# Patient Record
Sex: MALE | Race: WHITE | Employment: OTHER | URBAN - METROPOLITAN AREA
[De-identification: names, ages, dates, MRNs, and addresses within clinical notes are randomized per-mention and may not be internally consistent; named-entity substitution may affect disease eponyms.]

---

## 2017-08-04 ENCOUNTER — OFFICE VISIT (OUTPATIENT)
Dept: SURGERY | Age: 62
End: 2017-08-04

## 2017-08-04 VITALS
DIASTOLIC BLOOD PRESSURE: 80 MMHG | BODY MASS INDEX: 32.87 KG/M2 | HEART RATE: 113 BPM | SYSTOLIC BLOOD PRESSURE: 114 MMHG | WEIGHT: 248 LBS | HEIGHT: 73 IN

## 2017-08-04 DIAGNOSIS — K50.919 CROHN'S DISEASE WITH COMPLICATION, UNSPECIFIED GASTROINTESTINAL TRACT LOCATION (HCC): Primary | ICD-10-CM

## 2017-08-04 DIAGNOSIS — L02.415 ABSCESS OF RIGHT THIGH: ICD-10-CM

## 2017-08-04 PROCEDURE — 10060 I&D ABSCESS SIMPLE/SINGLE: CPT | Performed by: SURGERY

## 2017-08-04 RX ORDER — CIPROFLOXACIN 500 MG/1
500 TABLET, FILM COATED ORAL 2 TIMES DAILY
Qty: 14 TABLET | Refills: 0 | Status: SHIPPED | OUTPATIENT
Start: 2017-08-04 | End: 2017-08-11

## 2017-08-04 ASSESSMENT — ENCOUNTER SYMPTOMS
EYES NEGATIVE: 1
GASTROINTESTINAL NEGATIVE: 1
ALLERGIC/IMMUNOLOGIC NEGATIVE: 1
COLOR CHANGE: 0
RESPIRATORY NEGATIVE: 1

## 2017-08-07 ENCOUNTER — TELEPHONE (OUTPATIENT)
Dept: SURGERY | Age: 62
End: 2017-08-07

## 2017-08-08 LAB
ANAEROBIC CULTURE: ABNORMAL
GRAM STAIN RESULT: ABNORMAL
ORGANISM: ABNORMAL
ORGANISM: ABNORMAL
WOUND/ABSCESS: ABNORMAL
WOUND/ABSCESS: ABNORMAL

## 2017-08-09 ENCOUNTER — TELEPHONE (OUTPATIENT)
Dept: SURGERY | Age: 62
End: 2017-08-09

## 2017-08-09 ENCOUNTER — OFFICE VISIT (OUTPATIENT)
Dept: SURGERY | Age: 62
End: 2017-08-09

## 2017-08-09 VITALS
SYSTOLIC BLOOD PRESSURE: 114 MMHG | BODY MASS INDEX: 31.7 KG/M2 | WEIGHT: 247 LBS | HEIGHT: 74 IN | DIASTOLIC BLOOD PRESSURE: 83 MMHG | HEART RATE: 95 BPM

## 2017-08-09 DIAGNOSIS — L02.415 ABSCESS OF RIGHT THIGH: ICD-10-CM

## 2017-08-09 PROCEDURE — 99024 POSTOP FOLLOW-UP VISIT: CPT | Performed by: NURSE PRACTITIONER

## 2017-08-09 RX ORDER — CLINDAMYCIN HYDROCHLORIDE 300 MG/1
300 CAPSULE ORAL 3 TIMES DAILY
Qty: 30 CAPSULE | Refills: 0 | Status: SHIPPED | OUTPATIENT
Start: 2017-08-09 | End: 2017-08-19

## 2017-08-13 PROBLEM — E11.9 DM2 (DIABETES MELLITUS, TYPE 2) (HCC): Status: ACTIVE | Noted: 2017-08-13

## 2017-08-13 PROBLEM — E78.2 MIXED HYPERLIPIDEMIA: Status: ACTIVE | Noted: 2017-08-13

## 2017-08-13 PROBLEM — E66.9 OBESITY (BMI 30-39.9): Status: ACTIVE | Noted: 2017-08-13

## 2017-08-13 PROBLEM — I21.4 NSTEMI (NON-ST ELEVATED MYOCARDIAL INFARCTION) (HCC): Status: ACTIVE | Noted: 2017-08-13

## 2017-08-18 ENCOUNTER — OFFICE VISIT (OUTPATIENT)
Dept: INTERNAL MEDICINE CLINIC | Age: 62
End: 2017-08-18

## 2017-08-18 VITALS
SYSTOLIC BLOOD PRESSURE: 98 MMHG | OXYGEN SATURATION: 97 % | HEIGHT: 74 IN | DIASTOLIC BLOOD PRESSURE: 62 MMHG | TEMPERATURE: 98.1 F | WEIGHT: 245 LBS | HEART RATE: 72 BPM | BODY MASS INDEX: 31.44 KG/M2

## 2017-08-18 DIAGNOSIS — I25.10 CORONARY ARTERY DISEASE INVOLVING NATIVE HEART WITHOUT ANGINA PECTORIS, UNSPECIFIED VESSEL OR LESION TYPE: Primary | ICD-10-CM

## 2017-08-18 DIAGNOSIS — K50.119 CROHN'S DISEASE OF LARGE INTESTINE WITH COMPLICATION (HCC): ICD-10-CM

## 2017-08-18 DIAGNOSIS — E78.5 HYPERLIPIDEMIA, UNSPECIFIED HYPERLIPIDEMIA TYPE: ICD-10-CM

## 2017-08-18 DIAGNOSIS — E11.9 TYPE 2 DIABETES MELLITUS WITHOUT COMPLICATION, UNSPECIFIED LONG TERM INSULIN USE STATUS: ICD-10-CM

## 2017-08-18 DIAGNOSIS — F17.200 SMOKING: ICD-10-CM

## 2017-08-18 PROCEDURE — 99204 OFFICE O/P NEW MOD 45 MIN: CPT | Performed by: INTERNAL MEDICINE

## 2017-08-18 RX ORDER — ATORVASTATIN CALCIUM 20 MG/1
20 TABLET, FILM COATED ORAL NIGHTLY
Qty: 90 TABLET | Refills: 3 | Status: SHIPPED | OUTPATIENT
Start: 2017-08-18 | End: 2017-08-29 | Stop reason: SDUPTHER

## 2017-08-18 RX ORDER — METOPROLOL SUCCINATE 25 MG/1
12.5 TABLET, EXTENDED RELEASE ORAL DAILY
Qty: 45 TABLET | Refills: 3 | Status: SHIPPED | OUTPATIENT
Start: 2017-08-18 | End: 2017-08-29 | Stop reason: DRUGHIGH

## 2017-08-18 RX ORDER — LANCETS 28 GAUGE
1 EACH MISCELLANEOUS DAILY
Qty: 200 EACH | Refills: 3 | Status: SHIPPED | OUTPATIENT
Start: 2017-08-18 | End: 2021-11-17

## 2017-08-18 RX ORDER — PEN NEEDLE, DIABETIC 30 GX3/16"
1 NEEDLE, DISPOSABLE MISCELLANEOUS
Qty: 200 EACH | Refills: 3 | Status: SHIPPED | OUTPATIENT
Start: 2017-08-18 | End: 2018-10-03 | Stop reason: SDUPTHER

## 2017-08-18 RX ORDER — INFLIXIMAB 100 MG/10ML
5 INJECTION, POWDER, LYOPHILIZED, FOR SOLUTION INTRAVENOUS SEE ADMIN INSTRUCTIONS
COMMUNITY

## 2017-08-18 ASSESSMENT — ENCOUNTER SYMPTOMS
VOMITING: 0
DIARRHEA: 0
NAUSEA: 0
TROUBLE SWALLOWING: 0
SHORTNESS OF BREATH: 0
SORE THROAT: 0
WHEEZING: 0
ABDOMINAL PAIN: 0

## 2017-08-21 ENCOUNTER — TELEPHONE (OUTPATIENT)
Dept: INTERNAL MEDICINE CLINIC | Age: 62
End: 2017-08-21

## 2017-08-21 DIAGNOSIS — E11.9 TYPE 2 DIABETES MELLITUS WITHOUT COMPLICATION, WITHOUT LONG-TERM CURRENT USE OF INSULIN (HCC): Primary | ICD-10-CM

## 2017-08-23 ENCOUNTER — TELEPHONE (OUTPATIENT)
Dept: INTERNAL MEDICINE CLINIC | Age: 62
End: 2017-08-23

## 2017-08-25 ENCOUNTER — OFFICE VISIT (OUTPATIENT)
Dept: INTERNAL MEDICINE CLINIC | Age: 62
End: 2017-08-25

## 2017-08-25 VITALS
DIASTOLIC BLOOD PRESSURE: 80 MMHG | OXYGEN SATURATION: 98 % | HEART RATE: 74 BPM | BODY MASS INDEX: 31.2 KG/M2 | SYSTOLIC BLOOD PRESSURE: 114 MMHG | WEIGHT: 243 LBS

## 2017-08-25 DIAGNOSIS — I21.4 NSTEMI (NON-ST ELEVATED MYOCARDIAL INFARCTION) (HCC): ICD-10-CM

## 2017-08-25 DIAGNOSIS — E11.59 TYPE 2 DIABETES MELLITUS WITH OTHER CIRCULATORY COMPLICATION, UNSPECIFIED LONG TERM INSULIN USE STATUS: Primary | ICD-10-CM

## 2017-08-25 PROCEDURE — 99214 OFFICE O/P EST MOD 30 MIN: CPT | Performed by: INTERNAL MEDICINE

## 2017-08-25 ASSESSMENT — ENCOUNTER SYMPTOMS
DIARRHEA: 0
TROUBLE SWALLOWING: 0
SORE THROAT: 0
NAUSEA: 0
WHEEZING: 0
ABDOMINAL PAIN: 0
VOMITING: 0
SHORTNESS OF BREATH: 0

## 2017-08-28 ENCOUNTER — TELEPHONE (OUTPATIENT)
Dept: SURGERY | Age: 62
End: 2017-08-28

## 2017-08-29 ENCOUNTER — TELEPHONE (OUTPATIENT)
Dept: CARDIOLOGY CLINIC | Age: 62
End: 2017-08-29

## 2017-08-29 ENCOUNTER — OFFICE VISIT (OUTPATIENT)
Dept: CARDIOLOGY CLINIC | Age: 62
End: 2017-08-29

## 2017-08-29 ENCOUNTER — OFFICE VISIT (OUTPATIENT)
Dept: SURGERY | Age: 62
End: 2017-08-29

## 2017-08-29 VITALS
HEIGHT: 74 IN | BODY MASS INDEX: 31.51 KG/M2 | DIASTOLIC BLOOD PRESSURE: 78 MMHG | OXYGEN SATURATION: 96 % | WEIGHT: 245.5 LBS | SYSTOLIC BLOOD PRESSURE: 132 MMHG | HEART RATE: 74 BPM

## 2017-08-29 VITALS
OXYGEN SATURATION: 93 % | WEIGHT: 245.4 LBS | DIASTOLIC BLOOD PRESSURE: 68 MMHG | SYSTOLIC BLOOD PRESSURE: 106 MMHG | HEART RATE: 73 BPM | BODY MASS INDEX: 31.51 KG/M2

## 2017-08-29 DIAGNOSIS — E78.2 MIXED HYPERLIPIDEMIA: ICD-10-CM

## 2017-08-29 DIAGNOSIS — I25.10 CORONARY ARTERY DISEASE INVOLVING NATIVE CORONARY ARTERY OF NATIVE HEART WITHOUT ANGINA PECTORIS: ICD-10-CM

## 2017-08-29 DIAGNOSIS — E11.9 TYPE 2 DIABETES MELLITUS WITHOUT COMPLICATION, UNSPECIFIED LONG TERM INSULIN USE STATUS: ICD-10-CM

## 2017-08-29 DIAGNOSIS — N49.2 ABSCESS OF SCROTAL WALL: Primary | ICD-10-CM

## 2017-08-29 DIAGNOSIS — I25.10 CORONARY ARTERY DISEASE INVOLVING NATIVE HEART WITHOUT ANGINA PECTORIS, UNSPECIFIED VESSEL OR LESION TYPE: ICD-10-CM

## 2017-08-29 DIAGNOSIS — I21.4 NSTEMI (NON-ST ELEVATED MYOCARDIAL INFARCTION) (HCC): Primary | ICD-10-CM

## 2017-08-29 DIAGNOSIS — E78.5 HYPERLIPIDEMIA, UNSPECIFIED HYPERLIPIDEMIA TYPE: ICD-10-CM

## 2017-08-29 DIAGNOSIS — Z72.0 TOBACCO ABUSE: ICD-10-CM

## 2017-08-29 DIAGNOSIS — I71.21 ASCENDING AORTIC ANEURYSM: ICD-10-CM

## 2017-08-29 PROCEDURE — 10060 I&D ABSCESS SIMPLE/SINGLE: CPT | Performed by: NURSE PRACTITIONER

## 2017-08-29 PROCEDURE — 99214 OFFICE O/P EST MOD 30 MIN: CPT | Performed by: NURSE PRACTITIONER

## 2017-08-29 PROCEDURE — 99213 OFFICE O/P EST LOW 20 MIN: CPT | Performed by: SURGERY

## 2017-08-29 PROCEDURE — 10021 FNA BX W/O IMG GDN 1ST LES: CPT | Performed by: NURSE PRACTITIONER

## 2017-08-29 RX ORDER — NITROGLYCERIN 0.4 MG/1
0.4 TABLET SUBLINGUAL EVERY 5 MIN PRN
Qty: 25 TABLET | Refills: 3 | Status: SHIPPED | OUTPATIENT
Start: 2017-08-29 | End: 2020-07-07 | Stop reason: SDUPTHER

## 2017-08-29 RX ORDER — AMOXICILLIN AND CLAVULANATE POTASSIUM 875; 125 MG/1; MG/1
1 TABLET, FILM COATED ORAL 2 TIMES DAILY
Qty: 14 TABLET | Refills: 0 | Status: SHIPPED | OUTPATIENT
Start: 2017-08-29 | End: 2017-09-05

## 2017-08-29 RX ORDER — METOPROLOL SUCCINATE 25 MG/1
25 TABLET, EXTENDED RELEASE ORAL DAILY
Qty: 90 TABLET | Refills: 3 | Status: SHIPPED | OUTPATIENT
Start: 2017-08-29 | End: 2017-09-06 | Stop reason: SDUPTHER

## 2017-08-29 RX ORDER — ATORVASTATIN CALCIUM 40 MG/1
40 TABLET, FILM COATED ORAL NIGHTLY
Qty: 90 TABLET | Refills: 3 | Status: SHIPPED | OUTPATIENT
Start: 2017-08-29 | End: 2018-09-07 | Stop reason: SDUPTHER

## 2017-08-29 ASSESSMENT — ENCOUNTER SYMPTOMS
GASTROINTESTINAL NEGATIVE: 1
RESPIRATORY NEGATIVE: 1
VOMITING: 0
ABDOMINAL PAIN: 0

## 2017-09-03 LAB
GRAM STAIN RESULT: ABNORMAL
ORGANISM: ABNORMAL
WOUND/ABSCESS: ABNORMAL

## 2017-09-06 ENCOUNTER — TELEPHONE (OUTPATIENT)
Dept: CARDIOLOGY CLINIC | Age: 62
End: 2017-09-06

## 2017-09-06 ENCOUNTER — TELEPHONE (OUTPATIENT)
Dept: SURGERY | Age: 62
End: 2017-09-06

## 2017-09-06 DIAGNOSIS — K61.1 PERIRECTAL ABSCESS: ICD-10-CM

## 2017-09-06 DIAGNOSIS — I25.10 CORONARY ARTERY DISEASE INVOLVING NATIVE HEART WITHOUT ANGINA PECTORIS, UNSPECIFIED VESSEL OR LESION TYPE: ICD-10-CM

## 2017-09-06 DIAGNOSIS — K50.90 CROHN'S DISEASE WITHOUT COMPLICATION, UNSPECIFIED GASTROINTESTINAL TRACT LOCATION (HCC): Primary | ICD-10-CM

## 2017-09-06 RX ORDER — METOPROLOL SUCCINATE 25 MG/1
25 TABLET, EXTENDED RELEASE ORAL DAILY
Qty: 90 TABLET | Refills: 3 | Status: SHIPPED | OUTPATIENT
Start: 2017-09-06 | End: 2017-12-13 | Stop reason: SDUPTHER

## 2017-09-07 ENCOUNTER — TELEPHONE (OUTPATIENT)
Dept: SURGERY | Age: 62
End: 2017-09-07

## 2017-09-14 ENCOUNTER — TELEPHONE (OUTPATIENT)
Dept: SURGERY | Age: 62
End: 2017-09-14

## 2017-10-24 ENCOUNTER — TELEPHONE (OUTPATIENT)
Dept: INTERNAL MEDICINE CLINIC | Age: 62
End: 2017-10-24

## 2017-10-24 NOTE — TELEPHONE ENCOUNTER
Patient would like a A1C drawn on 11/2/17 during his office visit, he would like a call to let him know this will be done so he can fast [phone].

## 2017-11-08 DIAGNOSIS — E11.9 TYPE 2 DIABETES MELLITUS WITHOUT COMPLICATION, WITHOUT LONG-TERM CURRENT USE OF INSULIN (HCC): ICD-10-CM

## 2017-11-29 ENCOUNTER — OFFICE VISIT (OUTPATIENT)
Dept: INTERNAL MEDICINE CLINIC | Age: 62
End: 2017-11-29

## 2017-11-29 VITALS
HEART RATE: 102 BPM | OXYGEN SATURATION: 98 % | SYSTOLIC BLOOD PRESSURE: 104 MMHG | WEIGHT: 245 LBS | BODY MASS INDEX: 31.46 KG/M2 | DIASTOLIC BLOOD PRESSURE: 76 MMHG

## 2017-11-29 DIAGNOSIS — E11.9 TYPE 2 DIABETES MELLITUS WITHOUT COMPLICATION, WITHOUT LONG-TERM CURRENT USE OF INSULIN (HCC): ICD-10-CM

## 2017-11-29 DIAGNOSIS — R06.02 SHORTNESS OF BREATH: Primary | ICD-10-CM

## 2017-11-29 PROCEDURE — 99212 OFFICE O/P EST SF 10 MIN: CPT | Performed by: INTERNAL MEDICINE

## 2017-11-30 ENCOUNTER — OFFICE VISIT (OUTPATIENT)
Dept: CARDIOLOGY CLINIC | Age: 62
End: 2017-11-30

## 2017-11-30 VITALS
HEART RATE: 78 BPM | OXYGEN SATURATION: 98 % | SYSTOLIC BLOOD PRESSURE: 114 MMHG | DIASTOLIC BLOOD PRESSURE: 74 MMHG | BODY MASS INDEX: 31.06 KG/M2 | WEIGHT: 242 LBS | HEIGHT: 74 IN

## 2017-11-30 DIAGNOSIS — R91.1 PULMONARY NODULE: ICD-10-CM

## 2017-11-30 DIAGNOSIS — R07.9 CHEST PAIN, UNSPECIFIED TYPE: Primary | ICD-10-CM

## 2017-11-30 DIAGNOSIS — I25.10 CORONARY ARTERY DISEASE INVOLVING NATIVE HEART WITHOUT ANGINA PECTORIS, UNSPECIFIED VESSEL OR LESION TYPE: ICD-10-CM

## 2017-11-30 DIAGNOSIS — Z87.891 HISTORY OF TOBACCO USE: ICD-10-CM

## 2017-11-30 DIAGNOSIS — I71.21 ASCENDING AORTIC ANEURYSM: ICD-10-CM

## 2017-11-30 DIAGNOSIS — R06.02 SOB (SHORTNESS OF BREATH): ICD-10-CM

## 2017-11-30 DIAGNOSIS — E78.2 MIXED HYPERLIPIDEMIA: ICD-10-CM

## 2017-11-30 LAB
ANION GAP SERPL CALCULATED.3IONS-SCNC: 14 MMOL/L (ref 3–16)
BASOPHILS ABSOLUTE: 0.1 K/UL (ref 0–0.2)
BASOPHILS RELATIVE PERCENT: 0.9 %
BUN BLDV-MCNC: 19 MG/DL (ref 7–20)
CALCIUM SERPL-MCNC: 10.1 MG/DL (ref 8.3–10.6)
CHLORIDE BLD-SCNC: 98 MMOL/L (ref 99–110)
CO2: 26 MMOL/L (ref 21–32)
CREAT SERPL-MCNC: 1 MG/DL (ref 0.8–1.3)
EOSINOPHILS ABSOLUTE: 0.3 K/UL (ref 0–0.6)
EOSINOPHILS RELATIVE PERCENT: 3.8 %
ESTIMATED AVERAGE GLUCOSE: 286.2 MG/DL
GFR AFRICAN AMERICAN: >60
GFR NON-AFRICAN AMERICAN: >60
GLUCOSE BLD-MCNC: 369 MG/DL (ref 70–99)
HBA1C MFR BLD: 11.6 %
HCT VFR BLD CALC: 47.2 % (ref 40.5–52.5)
HEMOGLOBIN: 16 G/DL (ref 13.5–17.5)
LYMPHOCYTES ABSOLUTE: 2.7 K/UL (ref 1–5.1)
LYMPHOCYTES RELATIVE PERCENT: 38.9 %
MCH RBC QN AUTO: 28.9 PG (ref 26–34)
MCHC RBC AUTO-ENTMCNC: 34 G/DL (ref 31–36)
MCV RBC AUTO: 85 FL (ref 80–100)
MONOCYTES ABSOLUTE: 0.7 K/UL (ref 0–1.3)
MONOCYTES RELATIVE PERCENT: 9.7 %
NEUTROPHILS ABSOLUTE: 3.2 K/UL (ref 1.7–7.7)
NEUTROPHILS RELATIVE PERCENT: 46.7 %
PDW BLD-RTO: 14.1 % (ref 12.4–15.4)
PLATELET # BLD: 221 K/UL (ref 135–450)
PMV BLD AUTO: 8.8 FL (ref 5–10.5)
POTASSIUM SERPL-SCNC: 5.3 MMOL/L (ref 3.5–5.1)
PRO-BNP: 19 PG/ML (ref 0–124)
RBC # BLD: 5.55 M/UL (ref 4.2–5.9)
SODIUM BLD-SCNC: 138 MMOL/L (ref 136–145)
WBC # BLD: 6.9 K/UL (ref 4–11)

## 2017-11-30 PROCEDURE — 99215 OFFICE O/P EST HI 40 MIN: CPT | Performed by: NURSE PRACTITIONER

## 2017-11-30 PROCEDURE — 93000 ELECTROCARDIOGRAM COMPLETE: CPT | Performed by: NURSE PRACTITIONER

## 2017-11-30 RX ORDER — FUROSEMIDE 40 MG/1
40 TABLET ORAL DAILY
Qty: 30 TABLET | Refills: 3 | Status: SHIPPED | OUTPATIENT
Start: 2017-11-30 | End: 2017-12-22 | Stop reason: SDUPTHER

## 2017-11-30 RX ORDER — CLOPIDOGREL BISULFATE 75 MG/1
75 TABLET ORAL DAILY
Qty: 30 TABLET | Refills: 3 | Status: SHIPPED | OUTPATIENT
Start: 2017-11-30 | End: 2017-12-22 | Stop reason: SDUPTHER

## 2017-11-30 ASSESSMENT — ENCOUNTER SYMPTOMS
ABDOMINAL PAIN: 0
SHORTNESS OF BREATH: 1
SORE THROAT: 0
DIARRHEA: 0
TROUBLE SWALLOWING: 0
WHEEZING: 0
NAUSEA: 0
VOMITING: 0

## 2017-11-30 NOTE — PROGRESS NOTES
[facility] [address]  Office Visit    Dillon Holloway  1955 November 30, 2017    CC:   Chief Complaint   Patient presents with    3 Month Follow-Up     NSTEMI, CAD, s/p PTCA/ Stent, AAA, HLD, DM    Chest Pain     feels like a bruise on left side of chest    Dizziness     sometimes, not sure if sugar or something else    Fatigue     feels exhausted all the time    Shortness of Breath     at rest and on exertion; did not have SOB during the time he skipped a dose of brilinta     HPI:  The patient is 65 y.o. male with a past medical history significant for tobacco use, AAA, DM, CAD and Crohn's with prior resection here for follow up. Hospitalized from 8/12/2017-8/15/2017 with NSTEMI. He underwent cardiac cath which resulted in SHAN x2 to RCA. LVEF 35% initially and follow up echo showing LVEF 55-60% (done after PCI). He was last seen in office in late 8/2017 at which time Toprol was increased and his statin was adjusted. Not feeling well: \"I have no energy and I can't breathe. \"  Has been occurring since procedure and BP and O2 sats have been normal. Sxs has worsened over last few weeks. Quit smoking since his MI (has 39 year history). Finds he has to take a deep breath at times and has difficulty taking it. Marked SOB with walking ~ 50 feet. Denies orthopnea/PND. Wife states he quits breathing in his sleep. He is unable to do any regular exercise d/t SOB. Denies cough. Has L axillary pain which is reproducible on palpation. Denies palpitations, dizziness, syncope, edema , weight change or claudication. Trialed day off Brilinta and SOB improved (was able to walk from [facility] [address] [place] [employer] [place])     Review of Systems:  Constitutional: Denies weakness, night sweats or fever. + chronic fatigue and worsening  HEENT: Denies new visual changes, ringing in ears, nosebleeds, nasal congestion  Respiratory: + increasing SOB.    Cardiovascular: see HPI  GI: Denies N/V, diarrhea, constipation, abdominal pain. + fistula  : Denies urinary frequency, urgency, incontinence, hematuria or dysuria. Skin: Denies rash, hives, or cyanosis  Musculoskeletal:+ generalized joint pain  Neurological: Denies syncope or TIA-like symptoms. Psychiatric: Denies anxiety, insomnia or depression     Past Medical History:   Diagnosis Date    CAD (coronary artery disease) 08/2017    NSTEMI    [facility]     Diabetes mellitus ([facility]     Fistula of intestine     History of resection of small bowel     Pancreatitis     Peritonitis ([facility]      Past Surgical History:   Procedure Laterality Date    ABSCESS DRAINAGE  2/11/15    I and d of scrotal abscess    ABSCESS DRAINAGE Right 08/04/2017    right inner thigh    CARDIAC CATHETERIZATION  08/2017    SHAN x 2 to RCA; OM and Dg stenosis    CHOLECYSTECTOMY      SMALL INTESTINE SURGERY       Family History   Problem Relation Age of Onset    Diabetes Mother     Cancer Mother      Social History   Substance Use Topics    Smoking status: Former Smoker     Packs/day: 1.50     Years: 10.00     Types: Cigarettes     Quit date: 8/12/2017    Smokeless tobacco: Never Used    Alcohol use Yes      Comment: occasional       Allergies   Allergen Reactions    Codeine      Pt unsure of exact reaction    Percocet [Oxycodone-Acetaminophen]      Pt \"doesn't feel right\" when taking.      Current Outpatient Prescriptions   Medication Sig Dispense Refill    clopidogrel (PLAVIX) 75 MG tablet Take 1 tablet by mouth daily 30 tablet 3    furosemide (LASIX) 40 MG tablet Take 1 tablet by mouth daily 30 tablet 3    metFORMIN (GLUCOPHAGE) 500 MG tablet Take 1 tablet by mouth 2 times daily (with meals) 270 tablet 3    metoprolol succinate (TOPROL XL) 25 MG extended release tablet Take 1 tablet by mouth daily 90 tablet 3    nitroGLYCERIN (NITROSTAT) 0.4 MG SL tablet Place 1 tablet under the tongue every 5 minutes as needed for Chest pain 25 tablet 3    atorvastatin (LIPITOR) 40 MG tablet Take 1 tablet by mouth nightly 90 tablet 3    inFLIXimab (REMICADE) 100 MG injection Infuse 5 mg/kg intravenously See Admin Instructions q 6-8 wks      FREESTYLE LANCETS MISC 1 each by Does not apply route daily 200 each 3    Insulin Pen Needle (PEN NEEDLES) 32G X 4 MM MISC 1 Container by Does not apply route every morning (before breakfast) 200 each 3    insulin glargine (LANTUS SOLOSTAR) 100 UNIT/ML injection pen Inject 25 Units into the skin nightly 8 Pen 3    aspirin 81 MG chewable tablet Take 1 tablet by mouth daily 30 tablet 3    glucose monitoring kit (FREESTYLE) monitoring kit 1 kit by Does not apply route daily as needed (BS checks) 1 kit 0    glucose blood VI test strips (FREESTYLE TEST STRIPS) strip 1 each by In Vitro route daily As needed. 100 each 3    HYDROcodone-acetaminophen (NORCO) 5-325 MG per tablet Take 1 tablet by mouth every 6 hours as needed for Pain.  mesalamine (PENTASA) 500 MG CR capsule Take 1,000 mg by mouth 2 times daily        No current facility-administered medications for this visit. Physical Exam:   /74   Pulse 78   Ht 6' 2\" (1.88 m)   Wt 242 lb (109.8 kg) Comment: without shoes  SpO2 98%   BMI 31.07 kg/m²   Wt Readings from Last 2 Encounters:   11/30/17 242 lb (109.8 kg)   11/29/17 245 lb (111.1 kg)     Constitutional: He is oriented to person, place, and time. He appears well-developed and well-nourished. Voicing several complaints and frustration. HEENT: Normocephalic and atraumatic. Sclerae anicteric. No xanthelasmas. EOM's intact. Neck: Neck supple. No JVD present. Carotids without bruits. No thyromegaly present. No lymphadenopathy present. Cardiovascular: RRR, normal S1 and S2; no murmur/gallop or rub, PMI nondisplaced  Pulmonary/Chest: Effort normal and breath sounds normal.  Lungs with mildly diminished breath sounds bilaterally.  Chest wall nontender  Abdominal: soft, nontender, nondistended. + bowel sounds; no hepatomegaly  Extremities: No edema, cyanosis, or clubbing. Pulses are 2+ radial/DP/PT bilaterally. Cap refill brisk. Neurological: No focal deficit. Skin: Skin is warm and dry. Psychiatric: He has a normal mood and affect. His speech is normal and behavior is normal.     Lab Review:   Lab Results   Component Value Date    TRIG 309 08/13/2017    HDL 42 08/13/2017    LDLCALC see below 08/13/2017    LDLDIRECT 76 08/13/2017    LABVLDL see below 08/13/2017     Lab Results   Component Value Date     08/13/2017    K 4.5 08/13/2017    CL 97 08/13/2017    CO2 25 08/13/2017    BUN 18 08/13/2017    CREATININE 0.8 08/13/2017    GLUCOSE 306 08/13/2017    CALCIUM 9.0 08/13/2017      Lab Results   Component Value Date    WBC 7.9 08/12/2017    HGB 16.1 08/12/2017    HCT 47.5 08/12/2017    MCV 86.0 08/12/2017     08/12/2017     ECG 11/30/2017: Normal sinus rhythm without ischemia    LHC/PCI 8/14/2017: (Dr. Gary Jackson)  Conclusions:  -100% proximal-mid RCA stenosis stented with a 3.5 x 38 and 4.0 x 16 mm overlapping Synergy stents  -70% stenosis in a large OM and 95% stenosis in a narrow-caliber diagonal branch  -LVEF 35% with global dysfunction but more severe inferior wall dysfunction  -LVEDP of 20 post procedure    Echo 8/14/2017:  Normal LV size and systolic function. Estimated ejection fraction is 60%. No valvular abnormalities. Normal study. CTA pulmonary 8/12/2017:  No acute pulmonary embolus detected.       Mild aneurysmal dilation of the ascending aorta, 4.1 cm.  Coronary artery   calcifications are noted.       2 patchy areas of ground-glass airspace disease right lower lobe.  Pneumonia   is suspected.       8.1 mm size nodule left lower lobe along the diaphragmatic pleura.  This   could be within the lung parenchyma or involve the pleura.  Follow-up is   recommended, see below. Assessment:    1.  Chest Pain, unspecified type  -atypical for angina (he states reproducible on palpation L axillary); unable

## 2017-11-30 NOTE — PROGRESS NOTES
Department of Internal Medicine  Clinic Note    Date: 11/29/2017                                               Subjective/Objective:     Chief Complaint   Patient presents with    Diabetes     HPI: Patient presents today for follow-up evaluation for type 2 diabetes. Today patient states he is able to take his metformin at 500 mg twice a day. The diarrhea he was endorsing previously have subsided. Patient states he has stopped taking his Lantus because he noticed his serum glucose had a drastic fluctuation when he will check it at home. Although never becoming hypoglycemic. We discussed the fact that his serum glucose will fluctuate throughout the day before and before and after meals and that this is why he needs to monitor and document his serum glucose at home. He was advised to start taking the Lantus and monitoring closely his serum glucose to determine efficacy of treatment. Patient will be given another referral for diabetic education. We also discussed today and exercise the patient states that his eating habits have improved significantly. He states he is unable to exercise because of excessive shortness of breath that he states is attributed to Brilinta. He will be seeing his cardiologist tomorrow to discuss potential treatment options to come off [address].          Patient Active Problem List    Diagnosis Date Noted    Chest pain      Priority: High    Ischemic cardiomyopathy     Ascending aortic aneurysm (HCC)     NSTEMI (non-ST elevated myocardial infarction) ([facility] [address] 08/13/2017    Mixed hyperlipidemia 08/13/2017    Type 2 diabetes mellitus without complication, without long-term current use of insulin ([address] 08/13/2017    Obesity (BMI 30-39.9) 08/13/2017    Tobacco abuse     Abscess of right thigh 08/09/2017    Perirectal abscess 02/11/2015    Crohn's disease ([facility] [address]        Past Medical History:   Diagnosis Date    CAD (coronary artery disease)     Crohn's disease ([address]     Diabetes mellitus ([facility] [address]     Fistula of intestine     History of resection of small bowel     Pancreatitis     Peritonitis (HCC)        Past Surgical History:   Procedure Laterality Date    ABSCESS DRAINAGE  2/11/15    I and d of scrotal abscess    ABSCESS DRAINAGE Right 08/04/2017    right inner thigh    CHOLECYSTECTOMY      SMALL INTESTINE SURGERY         Office Visit on 08/29/2017   Component Date Value Ref Range Status    Gram Stain Result 09/03/2017 *  Final                    Value:3+ WBC's (Polymorphonuclear)  3+ Gram positive cocci  3+ Gram negative rods      Organism 09/03/2017 Streptococcus species*  Final    WOUND/ABSCESS 09/03/2017    Final                    Value:Light growth  Nutrionally deficient, no further workup         Family History   Problem Relation Age of Onset    Diabetes Mother     Cancer Mother        Current Outpatient Prescriptions   Medication Sig Dispense Refill    metFORMIN (GLUCOPHAGE) 500 MG tablet Take 1 tablet by mouth 2 times daily (with meals) 270 tablet 3    metoprolol succinate (TOPROL XL) 25 MG extended release tablet Take 1 tablet by mouth daily 90 tablet 3    nitroGLYCERIN (NITROSTAT) 0.4 MG SL tablet Place 1 tablet under the tongue every 5 minutes as needed for Chest pain 25 tablet 3    atorvastatin (LIPITOR) 40 MG tablet Take 1 tablet by mouth nightly 90 tablet 3    inFLIXimab (REMICADE) 100 MG injection Infuse 5 mg/kg intravenously See Admin Instructions q 6-8 wks      FREESTYLE LANCETS MISC 1 each by Does not apply route daily 200 each 3    Insulin Pen Needle (PEN NEEDLES) 32G X 4 MM MISC 1 Container by Does not apply route every morning (before breakfast) 200 each 3    ticagrelor (BRILINTA) 90 MG TABS tablet Take 1 tablet by mouth 2 times daily 180 tablet 3    insulin glargine (LANTUS SOLOSTAR) 100 UNIT/ML injection pen Inject 25 Units into the skin nightly 8 Pen 3    aspirin 81 MG chewable tablet Take 1 tablet by mouth daily 30 tablet 3    glucose monitoring kit (FREESTYLE) monitoring kit 1 kit by Does not apply route daily as needed (BS checks) 1 kit 0    glucose blood VI test strips (FREESTYLE TEST STRIPS) strip 1 each by In Vitro route daily As needed. 100 each 3    HYDROcodone-acetaminophen (NORCO) 5-325 MG per tablet Take 1 tablet by mouth every 6 hours as needed for Pain.  mesalamine (PENTASA) 500 MG CR capsule Take 1,000 mg by mouth 2 times daily        No current facility-administered medications for this visit. Allergies   Allergen Reactions    Codeine      Pt unsure of exact reaction    Percocet [Oxycodone-Acetaminophen]      Pt \"doesn't feel right\" when taking. Review of Systems   Constitutional: Negative for chills, fatigue and fever. HENT: Negative for ear pain, sore throat, tinnitus and trouble swallowing. Eyes: Negative for visual disturbance. Respiratory: Positive for shortness of breath. Negative for wheezing. Cardiovascular: Negative for chest pain and palpitations. Gastrointestinal: Negative for abdominal pain, diarrhea, nausea and vomiting. Endocrine: Negative for cold intolerance and heat intolerance. Genitourinary: Negative for difficulty urinating and dysuria. Neurological: Negative for dizziness, weakness and numbness. Psychiatric/Behavioral: Negative for agitation, decreased concentration and suicidal ideas. The patient is not nervous/anxious. All other systems reviewed and are negative. Vitals:  /76 (Site: Right Arm, Cuff Size: Large Adult)   Pulse 102   Wt 245 lb (111.1 kg)   SpO2 98%   BMI 31.46 kg/m²     Physical Exam   Constitutional: He is oriented to person, place, and time. He appears well-developed and well-nourished. HENT:   Head: Normocephalic and atraumatic. Eyes: Conjunctivae and lids are normal. Pupils are equal, round, and reactive to light. Neck: Trachea normal and normal range of motion. No hepatojugular reflux and no JVD present.  Carotid bruit is not

## 2017-12-01 ENCOUNTER — TELEPHONE (OUTPATIENT)
Dept: PULMONOLOGY | Age: 62
End: 2017-12-01

## 2017-12-01 DIAGNOSIS — R06.02 SHORTNESS OF BREATH: Primary | ICD-10-CM

## 2017-12-01 NOTE — COMMUNICATION BODY
similar to prior cardiac pain  -continue medical management with DAPT, Toprol and statin  -risk factor modification    4. Ascending aortic aneurysm (HCC)  -4.1 cms on CT in 8/2017  -BP control; continue BB    5. Mixed hyperlipidemia  -on high intensity statin    6. Pulmonary nodule  -8.1 mm nodule on CTA in 8/2017  -now with increasing SOB and smoking history  -will have pulmonary evaluate    7. H/O Tobacco abuse  -tobacco free x 3 months    8. Diabetes Mellitus, type II  -Hgb A1c 11.6 on 11/29/2017    9. H/O Crohn's    Plan:    Stop Brilinta and add Plavix 75 mg daily  Add lasix 40 mg daily  Check CBC, BMP, BNP  Continue with Toprol,  ASA, and Atorvastatin   NTG PRN (use reviewed with pt)  Refer to pulmonary regarding abnormal CT (8/2017) and SOB  Discussed low fat/low sodium diet and discussed regular aerobic exercise . Reviewed with Dr. Dain Powell: he is scheduled to see him in 2 weeks and will keep appointment to see if any symptom improvement  Approximately 50 minutes spent with pt and his wife and > 50% of time spent answering questions, reviewing prior testing, discussing meds and providing pt education/coordination of care. Follow up with Dr. Dain Powell as scheduled in December 2017    Return for as scheduled with Dr. Dain Powell in December 2017. Thanks for allowing me to participate in the care of this patient.

## 2017-12-04 NOTE — TELEPHONE ENCOUNTER
Spoke to [place]. He said the cardiologist wanted the pulmonary issues addressed before he came in to see him on 12/13. Scheduled 1:20pm tomorrow.   Gave number to schedule PFT if possible

## 2017-12-04 NOTE — TELEPHONE ENCOUNTER
Left vm to call back to schedule. Will need PFT. Carina Benjamin   Please order and give number to schedule

## 2017-12-05 ENCOUNTER — OFFICE VISIT (OUTPATIENT)
Dept: PULMONOLOGY | Age: 62
End: 2017-12-05

## 2017-12-05 VITALS
OXYGEN SATURATION: 96 % | RESPIRATION RATE: 16 BRPM | HEART RATE: 76 BPM | DIASTOLIC BLOOD PRESSURE: 70 MMHG | SYSTOLIC BLOOD PRESSURE: 112 MMHG | BODY MASS INDEX: 31.49 KG/M2 | HEIGHT: 74 IN | TEMPERATURE: 97.6 F | WEIGHT: 245.4 LBS

## 2017-12-05 DIAGNOSIS — R06.02 SHORTNESS OF BREATH: Primary | ICD-10-CM

## 2017-12-05 DIAGNOSIS — F17.201 TOBACCO ABUSE, IN REMISSION: ICD-10-CM

## 2017-12-05 DIAGNOSIS — R91.1 PULMONARY NODULE SEEN ON IMAGING STUDY: ICD-10-CM

## 2017-12-05 PROCEDURE — 99245 OFF/OP CONSLTJ NEW/EST HI 55: CPT | Performed by: INTERNAL MEDICINE

## 2017-12-05 NOTE — PROGRESS NOTES
throat  Eyes: Negative for redness   Respiratory: + for dyspnea,- cough  Cardiovascular: Negative for chest pain  Gastrointestinal: Negative for vomiting, diarrhea   Genitourinary: Negative for hematuria   Musculoskeletal: Negative for arthralgias   Skin: Negative for rash  Neurological: Negative for syncope  Hematological: Negative for adenopathy  Psychiatric/Behavorial: Negative for anxiety    Objective:   PHYSICAL EXAM:  Blood pressure 112/70, pulse 76, temperature 97.6 °F (36.4 °C), temperature source Oral, resp. rate 16, height 6' 2\" (1.88 m), weight 245 lb 6.4 oz (111.3 kg), SpO2 96 %.'  Gen: No distress. Eyes: PERRL. No sclera icterus. No conjunctival injection. ENT: No discharge. Pharynx clear. External appearance of ears and nose normal.  Neck: Trachea midline. No obvious mass. Resp: No accessory muscle use. No crackles. No wheezes. No rhonchi. CV: Regular rate. Regular rhythm. No murmur or rub. No edema. GI: Non-tender. Non-distended. No hernia. Skin: Warm, dry, normal texture and turgor. No nodule on exposed extremities. Lymph: No cervical LAD. No supraclavicular LAD. M/S: No cyanosis. No clubbing. No joint deformity. Neuro: Moves all four extremities. Psych: Oriented x 3. No anxiety. Awake. Alert. Intact judgement and insight.     Current Outpatient Prescriptions   Medication Sig Dispense Refill    furosemide (LASIX) 40 MG tablet Take 1 tablet by mouth daily 30 tablet 3    metFORMIN (GLUCOPHAGE) 500 MG tablet Take 1 tablet by mouth 2 times daily (with meals) 270 tablet 3    metoprolol succinate (TOPROL XL) 25 MG extended release tablet Take 1 tablet by mouth daily 90 tablet 3    nitroGLYCERIN (NITROSTAT) 0.4 MG SL tablet Place 1 tablet under the tongue every 5 minutes as needed for Chest pain 25 tablet 3    atorvastatin (LIPITOR) 40 MG tablet Take 1 tablet by mouth nightly 90 tablet 3    inFLIXimab (REMICADE) 100 MG injection Infuse 5 mg/kg intravenously See Admin quit    Plan:      1. Shortness of breath  dyspnea has significantly improved after stopping Brilenta. Because of this improvement, he did not want to start inhaler treatment. Monitor as he starts cardiac rehab. 2. Pulmonary nodule seen on imaging study  Small pulmonary nodule at left base. Will get CT chest in 6 months to monitor. PFT scheduled for tomorrow. - CT Chest WO Contrast; Future    3.  Tobacco abuse, in remission  Will need yearly CT for lung cancer screening

## 2017-12-07 ENCOUNTER — HOSPITAL ENCOUNTER (OUTPATIENT)
Dept: PULMONOLOGY | Age: 62
Discharge: HOME | End: 2017-12-07
Attending: INTERNAL MEDICINE | Admitting: INTERNAL MEDICINE

## 2017-12-07 DIAGNOSIS — R06.02 SHORTNESS OF BREATH: ICD-10-CM

## 2017-12-07 LAB
DLCO %PRED: NORMAL
DLCO PRE: NORMAL
FEF 25-75%-POST: NORMAL
FEF 25-75%-PRE: NORMAL
FEV1-POST: NORMAL
FEV1-PRE: NORMAL
FEV1/FVC-POST: NORMAL
FEV1/FVC-PRE: NORMAL
FVC-POST: NORMAL
FVC-PRE: NORMAL
MEP: NORMAL
MIP: NORMAL
MVV %PRED-PRE: NORMAL
MVV-PRE: NORMAL
TLC %PRED: NORMAL
TLC PRE: NORMAL

## 2017-12-07 PROCEDURE — 94729 DIFFUSING CAPACITY: CPT | Performed by: INTERNAL MEDICINE

## 2017-12-07 PROCEDURE — 94727 GAS DIL/WSHOT DETER LNG VOL: CPT | Performed by: INTERNAL MEDICINE

## 2017-12-07 PROCEDURE — 94060 EVALUATION OF WHEEZING: CPT | Performed by: INTERNAL MEDICINE

## 2017-12-07 RX ORDER — ALBUTEROL SULFATE 90 UG/1
4 AEROSOL, METERED RESPIRATORY (INHALATION) ONCE
Status: COMPLETED | OUTPATIENT
Start: 2017-12-07 | End: 2017-12-07

## 2017-12-07 RX ADMIN — ALBUTEROL SULFATE 4 PUFF: 90 AEROSOL, METERED RESPIRATORY (INHALATION) at 13:52

## 2017-12-13 ENCOUNTER — OFFICE VISIT (OUTPATIENT)
Dept: CARDIOLOGY CLINIC | Age: 62
End: 2017-12-13

## 2017-12-13 VITALS
HEART RATE: 88 BPM | OXYGEN SATURATION: 98 % | WEIGHT: 243.13 LBS | HEIGHT: 74 IN | SYSTOLIC BLOOD PRESSURE: 122 MMHG | BODY MASS INDEX: 31.2 KG/M2 | DIASTOLIC BLOOD PRESSURE: 76 MMHG

## 2017-12-13 DIAGNOSIS — R91.1 PULMONARY NODULE: ICD-10-CM

## 2017-12-13 DIAGNOSIS — Z72.0 TOBACCO ABUSE: ICD-10-CM

## 2017-12-13 DIAGNOSIS — I25.10 CORONARY ARTERY DISEASE INVOLVING NATIVE HEART WITHOUT ANGINA PECTORIS, UNSPECIFIED VESSEL OR LESION TYPE: ICD-10-CM

## 2017-12-13 DIAGNOSIS — E78.2 MIXED HYPERLIPIDEMIA: ICD-10-CM

## 2017-12-13 DIAGNOSIS — I71.40 ABDOMINAL AORTIC ANEURYSM (AAA) WITHOUT RUPTURE: Primary | ICD-10-CM

## 2017-12-13 DIAGNOSIS — R06.02 SOB (SHORTNESS OF BREATH): ICD-10-CM

## 2017-12-13 DIAGNOSIS — I10 ESSENTIAL HYPERTENSION: ICD-10-CM

## 2017-12-13 PROCEDURE — 99214 OFFICE O/P EST MOD 30 MIN: CPT | Performed by: INTERNAL MEDICINE

## 2017-12-13 RX ORDER — METOPROLOL SUCCINATE 25 MG/1
12.5 TABLET, EXTENDED RELEASE ORAL NIGHTLY
Qty: 90 TABLET | Refills: 3
Start: 2017-12-13 | End: 2019-01-30 | Stop reason: SDUPTHER

## 2017-12-22 ENCOUNTER — TELEPHONE (OUTPATIENT)
Dept: CARDIOLOGY CLINIC | Age: 62
End: 2017-12-22

## 2017-12-22 DIAGNOSIS — R07.9 CHEST PAIN, UNSPECIFIED TYPE: ICD-10-CM

## 2017-12-22 DIAGNOSIS — R06.02 SOB (SHORTNESS OF BREATH): ICD-10-CM

## 2017-12-22 DIAGNOSIS — I25.10 CORONARY ARTERY DISEASE INVOLVING NATIVE HEART WITHOUT ANGINA PECTORIS, UNSPECIFIED VESSEL OR LESION TYPE: ICD-10-CM

## 2017-12-22 RX ORDER — CLOPIDOGREL BISULFATE 75 MG/1
75 TABLET ORAL DAILY
Qty: 90 TABLET | Refills: 3 | Status: SHIPPED | OUTPATIENT
Start: 2017-12-22 | End: 2019-01-24 | Stop reason: SDUPTHER

## 2017-12-22 RX ORDER — FUROSEMIDE 40 MG/1
40 TABLET ORAL DAILY
Qty: 90 TABLET | Refills: 3 | Status: SHIPPED | OUTPATIENT
Start: 2017-12-22 | End: 2018-05-23 | Stop reason: SDUPTHER

## 2018-01-05 ENCOUNTER — TELEPHONE (OUTPATIENT)
Dept: CARDIOLOGY CLINIC | Age: 63
End: 2018-01-05

## 2018-01-05 NOTE — TELEPHONE ENCOUNTER
Davie Acevedo called in stating she is printing of [address] diagnosis' from [address] to take with them to file for Disability. She says that [facility] doesn't show is diagnosis of CHF and wants to know if Dr. Ni Hines could add it to [employer]?       You can reach Davie Acevedo at #[phone]

## 2018-01-08 NOTE — TELEPHONE ENCOUNTER
I spoke to Teresa Parra ( wife) and she would like to know why, if Renata Santana does not have CHF,  Why is he being treated with 40 mg Lasix, no swelling? Would like to maybe come off of the Lasix or lower the dose. Says that she would like for Dr Tony Owen to please review the Echo. I informed her that EF was 60% and she is not sure that it is at that percentage?

## 2018-01-18 DIAGNOSIS — R06.02 SOB (SHORTNESS OF BREATH): ICD-10-CM

## 2018-01-18 DIAGNOSIS — I10 ESSENTIAL HYPERTENSION: ICD-10-CM

## 2018-01-18 LAB
ANION GAP SERPL CALCULATED.3IONS-SCNC: 14 MMOL/L (ref 3–16)
BUN BLDV-MCNC: 16 MG/DL (ref 7–20)
CALCIUM SERPL-MCNC: 10.1 MG/DL (ref 8.3–10.6)
CHLORIDE BLD-SCNC: 97 MMOL/L (ref 99–110)
CO2: 28 MMOL/L (ref 21–32)
CREAT SERPL-MCNC: 1 MG/DL (ref 0.8–1.3)
GFR AFRICAN AMERICAN: >60
GFR NON-AFRICAN AMERICAN: >60
GLUCOSE BLD-MCNC: 381 MG/DL (ref 70–99)
POTASSIUM SERPL-SCNC: 5.1 MMOL/L (ref 3.5–5.1)
PRO-BNP: 59 PG/ML (ref 0–124)
SODIUM BLD-SCNC: 139 MMOL/L (ref 136–145)

## 2018-01-30 ENCOUNTER — TELEPHONE (OUTPATIENT)
Dept: CARDIOLOGY CLINIC | Age: 63
End: 2018-01-30

## 2018-01-30 ENCOUNTER — TELEPHONE (OUTPATIENT)
Dept: INTERNAL MEDICINE CLINIC | Age: 63
End: 2018-01-30

## 2018-01-30 ENCOUNTER — TELEPHONE (OUTPATIENT)
Dept: PULMONOLOGY | Age: 63
End: 2018-01-30

## 2018-02-22 ENCOUNTER — OFFICE VISIT (OUTPATIENT)
Dept: INTERNAL MEDICINE CLINIC | Age: 63
End: 2018-02-22

## 2018-02-22 VITALS
BODY MASS INDEX: 31.97 KG/M2 | DIASTOLIC BLOOD PRESSURE: 72 MMHG | OXYGEN SATURATION: 97 % | SYSTOLIC BLOOD PRESSURE: 112 MMHG | WEIGHT: 249 LBS | HEART RATE: 78 BPM

## 2018-02-22 DIAGNOSIS — E11.9 TYPE 2 DIABETES MELLITUS WITHOUT COMPLICATION, WITHOUT LONG-TERM CURRENT USE OF INSULIN (HCC): ICD-10-CM

## 2018-02-22 DIAGNOSIS — E66.9 OBESITY (BMI 30-39.9): ICD-10-CM

## 2018-02-22 DIAGNOSIS — Z79.4 TYPE 2 DIABETES MELLITUS WITH OTHER CIRCULATORY COMPLICATION, WITH LONG-TERM CURRENT USE OF INSULIN (HCC): Primary | ICD-10-CM

## 2018-02-22 DIAGNOSIS — I21.4 NSTEMI (NON-ST ELEVATED MYOCARDIAL INFARCTION) (HCC): ICD-10-CM

## 2018-02-22 DIAGNOSIS — E11.59 TYPE 2 DIABETES MELLITUS WITH OTHER CIRCULATORY COMPLICATION, WITH LONG-TERM CURRENT USE OF INSULIN (HCC): Primary | ICD-10-CM

## 2018-02-22 DIAGNOSIS — E78.2 MIXED HYPERLIPIDEMIA: ICD-10-CM

## 2018-02-22 DIAGNOSIS — K50.90 CROHN'S DISEASE WITHOUT COMPLICATION, UNSPECIFIED GASTROINTESTINAL TRACT LOCATION (HCC): ICD-10-CM

## 2018-02-22 PROCEDURE — 99214 OFFICE O/P EST MOD 30 MIN: CPT | Performed by: INTERNAL MEDICINE

## 2018-02-22 ASSESSMENT — ENCOUNTER SYMPTOMS
VOMITING: 0
SHORTNESS OF BREATH: 0
SORE THROAT: 0
TROUBLE SWALLOWING: 0
DIARRHEA: 0
NAUSEA: 0
ABDOMINAL PAIN: 0
WHEEZING: 0

## 2018-02-22 NOTE — PROGRESS NOTES
BP Readings from Last 2 Encounters:   02/22/18 112/72   12/13/17 122/76     Hemoglobin A1C (%)   Date Value   11/29/2017 11.6     LDL Calculated (mg/dL)   Date Value   08/13/2017 see below              Tobacco use:  Patient  reports that he quit smoking about 6 months ago. His smoking use included Cigarettes. He has a 15.00 pack-year smoking history. He quit smokeless tobacco use about 6 months ago. If Smoker - Cessation materials given? NA    Is Daily aspirin on medication list?:  Yes    Diabetic retinal exam done? No   If yes, document in Health Maintenance. Monofilament placed on counter? Yes    Shoes and socks removed? Yes    BP taken with correct size cuff? Yes   Repeated if > 130/80 NA     Microalbumin performed if applicable?   NA

## 2018-02-22 NOTE — PROGRESS NOTES
Perirectal abscess 02/11/2015    Crohn's disease [facility])        Social History:   TOBACCO:   reports that he quit smoking about 6 months ago. His smoking use included Cigarettes. He has a 15.00 pack-year smoking history. He quit smokeless tobacco use about 6 months ago. ETOH:   reports that he drinks alcohol. Past Medical History:   Diagnosis Date    CAD (coronary artery disease) 08/2017    NSTEMI    Chronic diastolic CHF (congestive heart failure), NYHA class 2 (HCC)     [facility]'s [facility]     Diabetes mellitus ([facility]     Fistula of intestine     History of resection of small bowel     Pancreatitis     Peritonitis ([facility] [facility]        Past Surgical History:   Procedure Laterality Date    ABSCESS DRAINAGE  2/11/15    I and d of scrotal abscess    ABSCESS DRAINAGE Right 08/04/2017    right inner thigh    CARDIAC CATHETERIZATION  08/2017    SHAN x 2 to RCA; OM and Dg stenosis    CHOLECYSTECTOMY      SMALL INTESTINE SURGERY         Orders Only on 01/18/2018   Component Date Value Ref Range Status    Sodium 01/18/2018 139  136 - 145 mmol/L Final    Potassium 01/18/2018 5.1  3.5 - 5.1 mmol/L Final    Chloride 01/18/2018 97* 99 - 110 mmol/L Final    CO2 01/18/2018 28  21 - 32 mmol/L Final    Anion Gap 01/18/2018 14  3 - 16 Final    Glucose 01/18/2018 381* 70 - 99 mg/dL Final    BUN 01/18/2018 16  7 - 20 mg/dL Final    CREATININE 01/18/2018 1.0  0.8 - 1.3 mg/dL Final    GFR Non- 01/18/2018 >60  >60 Final    Comment: >60 mL/min/1.73m2 EGFR, calc. for ages 25 and older using the  MDRD formula (not corrected for weight), is valid for stable  renal function.  GFR  01/18/2018 >60  >60 Final    Comment: Chronic Kidney Disease: less than 60 ml/min/1.73 sq.m. Kidney Failure: less than 15 ml/min/1.73 sq.m. Results valid for patients 18 years and older.       Calcium 01/18/2018 10.1  8.3 - 10.6 mg/dL Final    Pro-BNP 01/18/2018 59  0 - 124 pg/mL Final    Comment: 3    HYDROcodone-acetaminophen (NORCO) 5-325 MG per tablet Take 1 tablet by mouth every 6 hours as needed for Pain.  mesalamine (PENTASA) 500 MG CR capsule Take 1,000 mg by mouth 2 times daily        No current facility-administered medications for this visit. Allergies   Allergen Reactions    Codeine      Pt unsure of exact reaction    Percocet [Oxycodone-Acetaminophen]      Pt \"doesn't feel right\" when taking. Review of Systems   Constitutional: Negative for chills, fatigue and fever. HENT: Negative for ear pain, sore throat, tinnitus and trouble swallowing. Eyes: Negative for visual disturbance. Respiratory: Negative for shortness of breath and wheezing. Cardiovascular: Negative for chest pain and palpitations. Gastrointestinal: Negative for abdominal pain, diarrhea, nausea and vomiting. Endocrine: Negative for cold intolerance and heat intolerance. Genitourinary: Negative for difficulty urinating and dysuria. Neurological: Negative for dizziness, weakness and numbness. Psychiatric/Behavioral: Positive for agitation. Negative for decreased concentration and suicidal ideas. The patient is not nervous/anxious. All other systems reviewed and are negative. Vitals:  /72 (Site: Right Arm, Cuff Size: Large Adult)   Pulse 78   Wt 249 lb (112.9 kg)   SpO2 97%   BMI 31.97 kg/m²     Physical Exam   Constitutional: He is oriented to person, place, and time. He appears well-developed and well-nourished. HENT:   Head: Normocephalic and atraumatic. Eyes: Conjunctivae and lids are normal. Pupils are equal, round, and reactive to light. Neck: Trachea normal and normal range of motion. No hepatojugular reflux and no JVD present. Carotid bruit is not present. No thyromegaly present. Cardiovascular: Normal rate, regular rhythm and normal heart sounds. No murmur heard. Pulmonary/Chest: Effort normal and breath sounds normal. No respiratory distress.    Abdominal:

## 2018-02-23 LAB
ESTIMATED AVERAGE GLUCOSE: 303.4 MG/DL
HBA1C MFR BLD: 12.2 %

## 2018-02-28 ENCOUNTER — TELEPHONE (OUTPATIENT)
Dept: INTERNAL MEDICINE CLINIC | Age: 63
End: 2018-02-28

## 2018-03-13 ENCOUNTER — OFFICE VISIT (OUTPATIENT)
Dept: CARDIOLOGY CLINIC | Age: 63
End: 2018-03-13

## 2018-03-13 VITALS
BODY MASS INDEX: 31.95 KG/M2 | SYSTOLIC BLOOD PRESSURE: 112 MMHG | HEIGHT: 74 IN | DIASTOLIC BLOOD PRESSURE: 74 MMHG | WEIGHT: 249 LBS | HEART RATE: 72 BPM | OXYGEN SATURATION: 96 %

## 2018-03-13 DIAGNOSIS — I25.10 CORONARY ARTERY DISEASE INVOLVING NATIVE CORONARY ARTERY OF NATIVE HEART WITHOUT ANGINA PECTORIS: Primary | ICD-10-CM

## 2018-03-13 DIAGNOSIS — I71.40 ABDOMINAL AORTIC ANEURYSM (AAA) WITHOUT RUPTURE: ICD-10-CM

## 2018-03-13 DIAGNOSIS — R91.1 PULMONARY NODULE: ICD-10-CM

## 2018-03-13 DIAGNOSIS — E78.5 HYPERLIPIDEMIA LDL GOAL <70: ICD-10-CM

## 2018-03-13 PROCEDURE — 99214 OFFICE O/P EST MOD 30 MIN: CPT | Performed by: INTERNAL MEDICINE

## 2018-03-13 NOTE — PROGRESS NOTES
[address]  Office Visit    Dillon Holloway  1955 March 13, 2018    CC: Shortness of breath    The patient is 65 y.o. male with a past medical history significant for tobacco use, AAA, DM, CAD and Crohn's with prior resection here for follow up. Hospitalized from 8/12/2017-8/15/2017 with NSTEMI. He underwent cardiac cath which resulted in SHAN x2 to RCA. LVEF 35% initially and follow up echo showing LVEF 55-60% (done after PCI). Due to his prior LV dysfunction and high LVEDP on the cath he was started on low dose diuretic therapy. He was referred to Pulmonary for a pulmonary nodule as well. He returns to the office today in follow-up. Sine we last saw Amadou Mathews he states he is feeling well. He went to the gym twice last week and did some circuit training. He reports he is wanting to also swim but will wait until the weather is warmer. Last week when he presented to the gym his heart rate climbed to 199 per the heart monitor on the machine he was using. He reports some palpitations recently but no chest pain. He reports this awareness occurs daily and can last up to minutes. Review of Systems:  Constitutional: Denies weakness, night sweats or fever. HEENT: Denies new visual changes, ringing in ears, nosebleeds, nasal congestion  Respiratory: Improved SOB   Cardiovascular: see HPI  GI: Denies N/V, diarrhea, constipation, abdominal pain. + fistula  : Denies urinary frequency, urgency, incontinence, hematuria or dysuria. Skin: Denies rash, hives, or cyanosis  Musculoskeletal:chronic joint pain  Neurological: Denies syncope or TIA-like symptoms.   Psychiatric: Denies anxiety, insomnia or depression     Social History   Substance Use Topics    Smoking status: Former Smoker     Packs/day: 1.50     Years: 10.00     Types: Cigarettes     Quit date: 8/12/2017    Smokeless tobacco: Former User     Quit date: 8/12/2017    Alcohol use Yes      Comment: occasional       Allergies diaphragmatic pleura.  This   could be within the lung parenchyma or involve the pleura.  Follow-up is   recommended, see below. Assessment:  1. Coronary artery disease involving native coronary artery of native heart without angina pectoris  2. Ascending aortic aneurysm ([address]  3. Hyperlipidemia with goal LDL<70mg/dL  4. Pulmonary nodule  5. H/O Tobacco abuse    Plan:   I think that Mr. Santiago York  is entirely stable from a cardiovascular standpoint. I see no need to pursue further testing. He may decrease his daily dose of Lasix to 20 mg daily as requested. I have instructed him that should he notice a gain in weight or swelling he should then resume 40 mg daily. His aneurysm is 4.1cm by CT in August. We will assess progression on his upcoming CT ordered by Dr. Rosy Mckinney in April 2018     I will see him in office for follow up in 6 months.

## 2018-03-13 NOTE — PATIENT INSTRUCTIONS
trans fat  · Read food labels, and try to avoid saturated and trans fats. They increase your risk of heart disease. Trans fat is found in many processed foods such as cookies and crackers. · Use olive or canola oil when you cook. Try cholesterol-lowering spreads, such as Benecol or Take Control. · Bake, broil, grill, or steam foods instead of frying them. · Choose lean meats instead of high-fat meats such as hot dogs and sausages. Cut off all visible fat when you prepare meat. · Eat fish, skinless poultry, and meat alternatives such as soy products instead of high-fat meats. Soy products, such as tofu, may be especially good for your heart. · Choose low-fat or fat-free milk and dairy products. Eat fish  · Eat at least two servings of fish a week. Certain fish, such as salmon and tuna, contain omega-3 fatty acids, which may help reduce your risk of heart attack. Eat foods high in fiber  · Eat a variety of grain products every day. Include whole-grain foods that have lots of fiber and nutrients. Examples of whole-grain foods include oats, whole wheat bread, and brown rice. · Buy whole-grain breads and cereals, instead of white bread or pastries. Limit salt and sodium  · Limit how much salt and sodium you eat to help lower your blood pressure. · Taste food before you salt it. Add only a little salt when you think you need it. With time, your taste buds will adjust to less salt. · Eat fewer snack items, fast foods, and other high-salt, processed foods. Check food labels for the amount of sodium in packaged foods. · Choose low-sodium versions of canned goods (such as soups, vegetables, and beans). Limit sugar  · Limit drinks and foods with added sugar. These include candy, desserts, and soda pop. Limit alcohol  · Limit alcohol to no more than 2 drinks a day for men and 1 drink a day for women. Too much alcohol can cause health problems. When should you call for help?   Watch closely for changes in your

## 2018-03-22 ENCOUNTER — TELEPHONE (OUTPATIENT)
Dept: INTERNAL MEDICINE CLINIC | Age: 63
End: 2018-03-22

## 2018-03-22 NOTE — TELEPHONE ENCOUNTER
FYI:    Pt update Dr Avery Mcknight. Pt blood sugar has been 150-250. States that he is beginning to understand how to keep his levels more stable. Pt is scheduled for follow up in may.

## 2018-04-03 ENCOUNTER — HOSPITAL ENCOUNTER (OUTPATIENT)
Dept: CT IMAGING | Age: 63
Discharge: HOME | End: 2018-04-03
Attending: INTERNAL MEDICINE | Admitting: INTERNAL MEDICINE

## 2018-04-03 DIAGNOSIS — R06.02 SHORTNESS OF BREATH: ICD-10-CM

## 2018-04-03 DIAGNOSIS — R91.1 PULMONARY NODULE SEEN ON IMAGING STUDY: ICD-10-CM

## 2018-05-02 ENCOUNTER — CARE COORDINATION (OUTPATIENT)
Dept: INTERNAL MEDICINE CLINIC | Age: 63
End: 2018-05-02

## 2018-05-17 ENCOUNTER — CARE COORDINATION (OUTPATIENT)
Dept: INTERNAL MEDICINE CLINIC | Age: 63
End: 2018-05-17

## 2018-05-23 ENCOUNTER — OFFICE VISIT (OUTPATIENT)
Dept: INTERNAL MEDICINE CLINIC | Age: 63
End: 2018-05-23

## 2018-05-23 VITALS
WEIGHT: 247 LBS | HEART RATE: 64 BPM | BODY MASS INDEX: 31.71 KG/M2 | DIASTOLIC BLOOD PRESSURE: 74 MMHG | OXYGEN SATURATION: 98 % | SYSTOLIC BLOOD PRESSURE: 122 MMHG

## 2018-05-23 DIAGNOSIS — Z11.59 ENCOUNTER FOR HEPATITIS C SCREENING TEST FOR LOW RISK PATIENT: ICD-10-CM

## 2018-05-23 DIAGNOSIS — E66.9 OBESITY (BMI 30-39.9): ICD-10-CM

## 2018-05-23 DIAGNOSIS — E11.9 TYPE 2 DIABETES MELLITUS WITHOUT COMPLICATION, WITHOUT LONG-TERM CURRENT USE OF INSULIN (HCC): ICD-10-CM

## 2018-05-23 DIAGNOSIS — K50.90 CROHN'S DISEASE WITHOUT COMPLICATION, UNSPECIFIED GASTROINTESTINAL TRACT LOCATION (HCC): ICD-10-CM

## 2018-05-23 DIAGNOSIS — Z11.4 SCREENING FOR HIV (HUMAN IMMUNODEFICIENCY VIRUS): ICD-10-CM

## 2018-05-23 DIAGNOSIS — E78.2 MIXED HYPERLIPIDEMIA: ICD-10-CM

## 2018-05-23 DIAGNOSIS — I25.5 ISCHEMIC CARDIOMYOPATHY: ICD-10-CM

## 2018-05-23 DIAGNOSIS — E11.9 TYPE 2 DIABETES MELLITUS WITHOUT COMPLICATION, WITHOUT LONG-TERM CURRENT USE OF INSULIN (HCC): Primary | ICD-10-CM

## 2018-05-23 LAB
ESTIMATED AVERAGE GLUCOSE: 240.3 MG/DL
HBA1C MFR BLD: 10 %
HEPATITIS C ANTIBODY INTERPRETATION: NORMAL

## 2018-05-23 PROCEDURE — 99214 OFFICE O/P EST MOD 30 MIN: CPT | Performed by: INTERNAL MEDICINE

## 2018-05-23 RX ORDER — FUROSEMIDE 20 MG/1
20 TABLET ORAL DAILY
Qty: 30 TABLET | Refills: 3 | Status: SHIPPED | OUTPATIENT
Start: 2018-05-23 | End: 2019-06-10 | Stop reason: SDUPTHER

## 2018-05-23 ASSESSMENT — ENCOUNTER SYMPTOMS
SHORTNESS OF BREATH: 0
WHEEZING: 0
VOMITING: 0
NAUSEA: 0
TROUBLE SWALLOWING: 0
SORE THROAT: 0
DIARRHEA: 0

## 2018-05-24 ENCOUNTER — TELEPHONE (OUTPATIENT)
Dept: INTERNAL MEDICINE CLINIC | Age: 63
End: 2018-05-24

## 2018-05-24 LAB
HIV AG/AB: NORMAL
HIV ANTIGEN: NORMAL
HIV-1 ANTIBODY: NORMAL
HIV-2 AB: NORMAL

## 2018-05-24 ASSESSMENT — ENCOUNTER SYMPTOMS: ABDOMINAL PAIN: 1

## 2018-05-25 ENCOUNTER — TELEPHONE (OUTPATIENT)
Dept: INTERNAL MEDICINE CLINIC | Age: 63
End: 2018-05-25

## 2018-09-07 DIAGNOSIS — E78.5 HYPERLIPIDEMIA, UNSPECIFIED HYPERLIPIDEMIA TYPE: ICD-10-CM

## 2018-09-07 DIAGNOSIS — I25.10 CORONARY ARTERY DISEASE INVOLVING NATIVE HEART WITHOUT ANGINA PECTORIS, UNSPECIFIED VESSEL OR LESION TYPE: ICD-10-CM

## 2018-09-07 RX ORDER — ATORVASTATIN CALCIUM 40 MG/1
40 TABLET, FILM COATED ORAL NIGHTLY
Qty: 90 TABLET | Refills: 3 | Status: SHIPPED | OUTPATIENT
Start: 2018-09-07 | End: 2019-10-21 | Stop reason: SDUPTHER

## 2018-09-26 ENCOUNTER — OFFICE VISIT (OUTPATIENT)
Dept: CARDIOLOGY CLINIC | Age: 63
End: 2018-09-26
Payer: COMMERCIAL

## 2018-09-26 VITALS
BODY MASS INDEX: 31.32 KG/M2 | HEIGHT: 74 IN | DIASTOLIC BLOOD PRESSURE: 76 MMHG | OXYGEN SATURATION: 95 % | HEART RATE: 88 BPM | WEIGHT: 244 LBS | SYSTOLIC BLOOD PRESSURE: 112 MMHG

## 2018-09-26 DIAGNOSIS — E78.5 HYPERLIPIDEMIA LDL GOAL <70: ICD-10-CM

## 2018-09-26 DIAGNOSIS — R40.0 DAYTIME SOMNOLENCE: ICD-10-CM

## 2018-09-26 DIAGNOSIS — I71.21 ASCENDING AORTIC ANEURYSM: ICD-10-CM

## 2018-09-26 DIAGNOSIS — I25.10 CORONARY ARTERY DISEASE INVOLVING NATIVE CORONARY ARTERY OF NATIVE HEART WITHOUT ANGINA PECTORIS: Primary | ICD-10-CM

## 2018-09-26 PROCEDURE — 99214 OFFICE O/P EST MOD 30 MIN: CPT | Performed by: INTERNAL MEDICINE

## 2018-09-26 PROCEDURE — 93000 ELECTROCARDIOGRAM COMPLETE: CPT | Performed by: INTERNAL MEDICINE

## 2018-09-26 NOTE — PROGRESS NOTES
1.50     Years: 10.00     Types: Cigarettes     Quit date: 8/12/2017    Smokeless tobacco: Former User     Quit date: 8/12/2017    Alcohol use Yes      Comment: occasional       Allergies   Allergen Reactions    Codeine      Pt unsure of exact reaction    Percocet [Oxycodone-Acetaminophen]      Pt \"doesn't feel right\" when taking. Current Outpatient Prescriptions   Medication Sig Dispense Refill    atorvastatin (LIPITOR) 40 MG tablet Take 1 tablet by mouth nightly 90 tablet 3    furosemide (LASIX) 20 MG tablet Take 1 tablet by mouth daily 30 tablet 3    clopidogrel (PLAVIX) 75 MG tablet Take 1 tablet by mouth daily 90 tablet 3    metoprolol succinate (TOPROL XL) 25 MG extended release tablet Take 0.5 tablets by mouth nightly 90 tablet 3    metFORMIN (GLUCOPHAGE) 500 MG tablet Take 1 tablet by mouth 2 times daily (with meals) 270 tablet 3    nitroGLYCERIN (NITROSTAT) 0.4 MG SL tablet Place 1 tablet under the tongue every 5 minutes as needed for Chest pain 25 tablet 3    inFLIXimab (REMICADE) 100 MG injection Infuse 5 mg/kg intravenously See Admin Instructions q 6-8 wks      FREESTYLE LANCETS MISC 1 each by Does not apply route daily 200 each 3    Insulin Pen Needle (PEN NEEDLES) 32G X 4 MM MISC 1 Container by Does not apply route every morning (before breakfast) 200 each 3    insulin glargine (LANTUS SOLOSTAR) 100 UNIT/ML injection pen Inject 25 Units into the skin nightly 8 Pen 3    aspirin 81 MG chewable tablet Take 1 tablet by mouth daily 30 tablet 3    glucose monitoring kit (FREESTYLE) monitoring kit 1 kit by Does not apply route daily as needed (BS checks) 1 kit 0    glucose blood VI test strips (FREESTYLE TEST STRIPS) strip 1 each by In Vitro route daily As needed. 100 each 3    HYDROcodone-acetaminophen (NORCO) 5-325 MG per tablet Take 1 tablet by mouth every 6 hours as needed for Pain.       mesalamine (PENTASA) 500 MG CR capsule Take 1,000 mg by mouth 2 times daily        No current services described in this documentation as scribed by my RN,  Mayra Yanes in my presence, and I confirm that the note above accurately reflects all work, treatment, procedures, and medical decision making performed by me.

## 2019-03-12 ENCOUNTER — OFFICE VISIT (OUTPATIENT)
Dept: ENDOCRINOLOGY | Age: 64
End: 2019-03-12
Payer: COMMERCIAL

## 2019-03-12 VITALS
BODY MASS INDEX: 31.06 KG/M2 | SYSTOLIC BLOOD PRESSURE: 117 MMHG | DIASTOLIC BLOOD PRESSURE: 78 MMHG | RESPIRATION RATE: 16 BRPM | HEART RATE: 78 BPM | WEIGHT: 242 LBS | OXYGEN SATURATION: 98 % | HEIGHT: 74 IN

## 2019-03-12 DIAGNOSIS — E11.00 TYPE 2 DIABETES MELLITUS WITH HYPEROSMOLARITY WITHOUT COMA, WITHOUT LONG-TERM CURRENT USE OF INSULIN (HCC): ICD-10-CM

## 2019-03-12 DIAGNOSIS — E11.00 TYPE 2 DIABETES MELLITUS WITH HYPEROSMOLARITY WITHOUT COMA, WITHOUT LONG-TERM CURRENT USE OF INSULIN (HCC): Primary | ICD-10-CM

## 2019-03-12 DIAGNOSIS — E11.9 TYPE 2 DIABETES MELLITUS WITHOUT COMPLICATION, WITHOUT LONG-TERM CURRENT USE OF INSULIN (HCC): ICD-10-CM

## 2019-03-12 DIAGNOSIS — E78.49 OTHER HYPERLIPIDEMIA: ICD-10-CM

## 2019-03-12 DIAGNOSIS — E11.65 UNCONTROLLED TYPE 2 DIABETES MELLITUS WITH HYPERGLYCEMIA (HCC): ICD-10-CM

## 2019-03-12 LAB — HBA1C MFR BLD: 11.3 %

## 2019-03-12 PROCEDURE — 83036 HEMOGLOBIN GLYCOSYLATED A1C: CPT | Performed by: INTERNAL MEDICINE

## 2019-03-12 PROCEDURE — 99205 OFFICE O/P NEW HI 60 MIN: CPT | Performed by: INTERNAL MEDICINE

## 2019-03-12 PROCEDURE — 97802 MEDICAL NUTRITION INDIV IN: CPT | Performed by: DIETITIAN, REGISTERED

## 2019-03-12 RX ORDER — METFORMIN HYDROCHLORIDE 500 MG/1
500 TABLET, EXTENDED RELEASE ORAL 2 TIMES DAILY WITH MEALS
Qty: 60 TABLET | Refills: 2 | Status: SHIPPED | OUTPATIENT
Start: 2019-03-12 | End: 2020-02-05 | Stop reason: SDUPTHER

## 2019-03-12 ASSESSMENT — PROBLEM AREAS IN DIABETES QUESTIONNAIRE (PAID)
PAID-5 TOTAL SCORE: 2
FEELING DEPRESSED WHEN YOU THINK ABOUT LIVING WITH DIABETES: 0
COPING WITH COMPLICATIONS OF DIABETES: 1
WORRYING ABOUT THE FUTURE AND THE POSSIBILITY OF SERIOUS COMPLICATIONS: 1
FEELING SCARED WHEN YOU THINK ABOUT LIVING WITH DIABETES: 0
FEELING THAT DIABETES IS TAKING UP TOO MUCH OF YOUR MENTAL AND PHYSICAL ENERGY EVERY DAY: 0

## 2019-03-12 ASSESSMENT — PATIENT HEALTH QUESTIONNAIRE - PHQ9: SUM OF ALL RESPONSES TO PHQ QUESTIONS 1-9: 0

## 2019-05-01 ENCOUNTER — OFFICE VISIT (OUTPATIENT)
Dept: CARDIOLOGY CLINIC | Age: 64
End: 2019-05-01
Payer: COMMERCIAL

## 2019-05-01 VITALS
HEIGHT: 74 IN | BODY MASS INDEX: 31.06 KG/M2 | WEIGHT: 242 LBS | OXYGEN SATURATION: 95 % | SYSTOLIC BLOOD PRESSURE: 102 MMHG | DIASTOLIC BLOOD PRESSURE: 68 MMHG | HEART RATE: 78 BPM

## 2019-05-01 DIAGNOSIS — E78.5 HYPERLIPIDEMIA LDL GOAL <70: ICD-10-CM

## 2019-05-01 DIAGNOSIS — I25.10 CORONARY ARTERY DISEASE INVOLVING NATIVE CORONARY ARTERY OF NATIVE HEART WITHOUT ANGINA PECTORIS: Primary | ICD-10-CM

## 2019-05-01 DIAGNOSIS — I71.21 ASCENDING AORTIC ANEURYSM: ICD-10-CM

## 2019-05-01 LAB
ALT SERPL-CCNC: 22 U/L (ref 10–40)
AST SERPL-CCNC: 16 U/L (ref 15–37)
CHOLESTEROL, FASTING: 105 MG/DL (ref 0–199)
HDLC SERPL-MCNC: 48 MG/DL (ref 40–60)
LDL CHOLESTEROL CALCULATED: 27 MG/DL
TRIGLYCERIDE, FASTING: 150 MG/DL (ref 0–150)
VLDLC SERPL CALC-MCNC: 30 MG/DL

## 2019-05-01 PROCEDURE — 99214 OFFICE O/P EST MOD 30 MIN: CPT | Performed by: INTERNAL MEDICINE

## 2019-05-01 NOTE — PROGRESS NOTES
Types: Cigarettes     Last attempt to quit: 2017     Years since quittin.7    Smokeless tobacco: Former User     Quit date: 2017   Substance Use Topics    Alcohol use: Yes     Comment: occasional    Drug use: No       Allergies   Allergen Reactions    Codeine      Pt unsure of exact reaction    Percocet [Oxycodone-Acetaminophen]      Pt \"doesn't feel right\" when taking. Current Outpatient Medications   Medication Sig Dispense Refill    metFORMIN (GLUCOPHAGE-XR) 500 MG extended release tablet Take 1 tablet by mouth 2 times daily (with meals) 60 tablet 2    insulin glargine (LANTUS SOLOSTAR) 100 UNIT/ML injection pen 40 units daily 30 mL 3    insulin lispro (HUMALOG KWIKPEN) 100 UNIT/ML pen 2-10 units AC TID 10 pen 3    Insulin Pen Needle (B-D UF III MINI PEN NEEDLES) 31G X 5 MM MISC 4 times a day 300 each 6    metoprolol succinate (TOPROL XL) 25 MG extended release tablet Take 0.5 tablets by mouth nightly 90 tablet 3    clopidogrel (PLAVIX) 75 MG tablet Take 1 tablet by mouth daily 90 tablet 3    Insulin Pen Needle (PEN NEEDLES) 32G X 4 MM MISC 1 Container by Does not apply route every morning (before breakfast) 200 each 3    atorvastatin (LIPITOR) 40 MG tablet Take 1 tablet by mouth nightly 90 tablet 3    furosemide (LASIX) 20 MG tablet Take 1 tablet by mouth daily 30 tablet 3    nitroGLYCERIN (NITROSTAT) 0.4 MG SL tablet Place 1 tablet under the tongue every 5 minutes as needed for Chest pain 25 tablet 3    inFLIXimab (REMICADE) 100 MG injection Infuse 5 mg/kg intravenously See Admin Instructions q 6-8 wks      FREESTYLE LANCETS MISC 1 each by Does not apply route daily 200 each 3    aspirin 81 MG chewable tablet Take 1 tablet by mouth daily 30 tablet 3    glucose monitoring kit (FREESTYLE) monitoring kit 1 kit by Does not apply route daily as needed (BS checks) 1 kit 0    glucose blood VI test strips (FREESTYLE TEST STRIPS) strip 1 each by In Vitro route daily As needed.  100 each 3    HYDROcodone-acetaminophen (NORCO) 5-325 MG per tablet Take 1 tablet by mouth every 6 hours as needed for Pain.  mesalamine (PENTASA) 500 MG CR capsule Take 1,000 mg by mouth 2 times daily        No current facility-administered medications for this visit. Physical Exam:   /68 (Site: Right Upper Arm, Position: Sitting, Cuff Size: Large Adult)   Pulse 78   Ht 6' 2\" (1.88 m)   Wt 242 lb (109.8 kg)   SpO2 95%   BMI 31.07 kg/m²   Wt Readings from Last 2 Encounters:   05/01/19 242 lb (109.8 kg)   03/12/19 242 lb (109.8 kg)     Constitutional: He is oriented to person, place, and time. He appears well-developed and well-nourished. Voicing several complaints and frustration. HEENT: Normocephalic and atraumatic. Sclerae anicteric. No xanthelasmas. EOM's intact. Neck: Neck supple. No JVD present. Carotids without bruits. No thyromegaly present. No lymphadenopathy present. Cardiovascular: RRR, normal S1 and S2; no murmur/gallop or rub, PMI nondisplaced  Pulmonary/Chest: Effort normal and breath sounds normal.  Lungs with mildly diminished breath sounds bilaterally. Chest wall nontender  Abdominal: soft, nontender, nondistended. + bowel sounds; no hepatomegaly  Extremities: No edema, cyanosis, or clubbing. Pulses are 2+ radial/DP/PT bilaterally. Cap refill brisk. Neurological: No focal deficit. Skin: Skin is warm and dry. Psychiatric: He has a normal mood and affect.  His speech is normal and behavior is normal.     Lab Review:   Lab Results   Component Value Date    TRIG 292 12/14/2018    HDL 44 12/14/2018    LDLCALC 32 12/14/2018    LDLDIRECT 76 08/13/2017    LABVLDL 58 12/14/2018     Lab Results   Component Value Date     12/14/2018    K 4.3 12/14/2018     12/14/2018    CO2 21 12/14/2018    BUN 16 12/14/2018    CREATININE 0.8 12/14/2018    GLUCOSE 296 12/14/2018    CALCIUM 9.9 12/14/2018      Lab Results   Component Value Date    WBC 5.4 12/14/2018    HGB 15.6 12/14/2018 HCT 46.7 12/14/2018    MCV 84.6 12/14/2018     12/14/2018     ECG 11/30/2017: Normal sinus rhythm without ischemia      Procedures:     LHC/PCI 8/14/2017 (Dr. Boris Archer)  Conclusions:  -100% proximal-mid RCA stenosis stented with a 3.5 x 38 and 4.0 x 16 mm overlapping Synergy stents  -70% stenosis in a large OM and 95% stenosis in a narrow-caliber diagonal branch  -LVEF 35% with global dysfunction but more severe inferior wall dysfunction  -LVEDP of 20 post procedure      Imaging:     Echo 8/14/2017  Normal LV size and systolic function. Estimated ejection fraction is 60%. No valvular abnormalities. Normal study. CTA pulmonary 8/12/2017  No acute pulmonary embolus detected.       Mild aneurysmal dilation of the ascending aorta, 4.1 cm.  Coronary artery   calcifications are noted.       2 patchy areas of ground-glass airspace disease right lower lobe.  Pneumonia   is suspected.       8.1 mm size nodule left lower lobe along the diaphragmatic pleura.  This   could be within the lung parenchyma or involve the pleura.  Follow-up is   recommended, see below. Chest CT 4/3/18  Mild aneurysmal dilatation of the ascending aorta measuring up   to 4 cm, stable       Assessment:  1. CAD involving native coronary artery of native heart without angina pectoris  2. Ascending aortic aneurysm ([address] 4cm  3. Hyperlipidemia with goal LDL<70mg/dL  4. Pulmonary nodule   5. H/O Tobacco abuse    Plan:   I think that Mr. Veronica Hidalgo  is entirely stable from a cardiovascular standpoint. I see no need to make any changes currently in his medical regimen. He denies any symptoms representing angina. I will have him complete a fasting lipid profile as well as AST and ALT to assess control of his cholesterol. I will also have him complete an echocardiogram to assess his LVEF as well as his aortic aneurysm. I have encouraged him to increase his aerobic activity. This will likely help to resolve his feelings of fatigue.      I will see him

## 2019-05-06 ENCOUNTER — CARE COORDINATION (OUTPATIENT)
Dept: CARE COORDINATION | Age: 64
End: 2019-05-06

## 2019-05-13 ENCOUNTER — HOSPITAL ENCOUNTER (OUTPATIENT)
Dept: NON INVASIVE DIAGNOSTICS | Age: 64
Discharge: HOME | End: 2019-05-13
Payer: COMMERCIAL

## 2019-05-13 DIAGNOSIS — I71.21 ASCENDING AORTIC ANEURYSM: ICD-10-CM

## 2019-05-13 DIAGNOSIS — I25.10 CORONARY ARTERY DISEASE INVOLVING NATIVE CORONARY ARTERY OF NATIVE HEART WITHOUT ANGINA PECTORIS: ICD-10-CM

## 2019-05-13 LAB
LV EF: 45 %
LVEF MODALITY: NORMAL

## 2019-05-13 PROCEDURE — 93306 TTE W/DOPPLER COMPLETE: CPT

## 2019-08-13 ENCOUNTER — HOSPITAL ENCOUNTER (EMERGENCY)
Age: 64
Discharge: HOME | End: 2019-08-13
Attending: EMERGENCY MEDICINE
Payer: COMMERCIAL

## 2019-08-13 ENCOUNTER — APPOINTMENT (OUTPATIENT)
Dept: GENERAL RADIOLOGY | Age: 64
End: 2019-08-13
Payer: COMMERCIAL

## 2019-08-13 VITALS
HEIGHT: 74 IN | RESPIRATION RATE: 18 BRPM | HEART RATE: 82 BPM | SYSTOLIC BLOOD PRESSURE: 118 MMHG | TEMPERATURE: 98 F | DIASTOLIC BLOOD PRESSURE: 72 MMHG | WEIGHT: 242 LBS | OXYGEN SATURATION: 97 % | BODY MASS INDEX: 31.06 KG/M2

## 2019-08-13 DIAGNOSIS — E16.2 HYPOGLYCEMIA: Primary | ICD-10-CM

## 2019-08-13 LAB
A/G RATIO: 1 (ref 1.1–2.2)
ALBUMIN SERPL-MCNC: 3.5 G/DL (ref 3.4–5)
ALP BLD-CCNC: 91 U/L (ref 40–129)
ALT SERPL-CCNC: 21 U/L (ref 10–40)
ANION GAP SERPL CALCULATED.3IONS-SCNC: 17 MMOL/L (ref 3–16)
AST SERPL-CCNC: 17 U/L (ref 15–37)
BASOPHILS ABSOLUTE: 0 K/UL (ref 0–0.2)
BASOPHILS RELATIVE PERCENT: 0.5 %
BILIRUB SERPL-MCNC: 0.4 MG/DL (ref 0–1)
BILIRUBIN URINE: NEGATIVE
BLOOD, URINE: NEGATIVE
BUN BLDV-MCNC: 21 MG/DL (ref 7–20)
CALCIUM SERPL-MCNC: 9.4 MG/DL (ref 8.3–10.6)
CHLORIDE BLD-SCNC: 97 MMOL/L (ref 99–110)
CLARITY: CLEAR
CO2: 21 MMOL/L (ref 21–32)
COLOR: YELLOW
CREAT SERPL-MCNC: 0.9 MG/DL (ref 0.8–1.3)
EKG ATRIAL RATE: 72 BPM
EKG DIAGNOSIS: NORMAL
EKG P AXIS: 13 DEGREES
EKG P-R INTERVAL: 188 MS
EKG Q-T INTERVAL: 370 MS
EKG QRS DURATION: 84 MS
EKG QTC CALCULATION (BAZETT): 405 MS
EKG R AXIS: 29 DEGREES
EKG T AXIS: 29 DEGREES
EKG VENTRICULAR RATE: 72 BPM
EOSINOPHILS ABSOLUTE: 0.1 K/UL (ref 0–0.6)
EOSINOPHILS RELATIVE PERCENT: 1 %
GFR AFRICAN AMERICAN: >60
GFR NON-AFRICAN AMERICAN: >60
GLOBULIN: 3.4 G/DL
GLUCOSE BLD-MCNC: 285 MG/DL (ref 70–99)
GLUCOSE BLD-MCNC: 298 MG/DL (ref 70–99)
GLUCOSE URINE: >=1000 MG/DL
HCT VFR BLD CALC: 40.5 % (ref 40.5–52.5)
HEMOGLOBIN: 13.7 G/DL (ref 13.5–17.5)
KETONES, URINE: NEGATIVE MG/DL
LEUKOCYTE ESTERASE, URINE: NEGATIVE
LYMPHOCYTES ABSOLUTE: 1.5 K/UL (ref 1–5.1)
LYMPHOCYTES RELATIVE PERCENT: 15.9 %
MCH RBC QN AUTO: 28.3 PG (ref 26–34)
MCHC RBC AUTO-ENTMCNC: 33.8 G/DL (ref 31–36)
MCV RBC AUTO: 83.8 FL (ref 80–100)
MICROSCOPIC EXAMINATION: ABNORMAL
MONOCYTES ABSOLUTE: 0.8 K/UL (ref 0–1.3)
MONOCYTES RELATIVE PERCENT: 7.9 %
NEUTROPHILS ABSOLUTE: 7.2 K/UL (ref 1.7–7.7)
NEUTROPHILS RELATIVE PERCENT: 74.7 %
NITRITE, URINE: NEGATIVE
PDW BLD-RTO: 14 % (ref 12.4–15.4)
PERFORMED ON: ABNORMAL
PH UA: 5.5 (ref 5–8)
PLATELET # BLD: 147 K/UL (ref 135–450)
PMV BLD AUTO: 8.8 FL (ref 5–10.5)
POTASSIUM SERPL-SCNC: 3.9 MMOL/L (ref 3.5–5.1)
PROTEIN UA: NEGATIVE MG/DL
RBC # BLD: 4.83 M/UL (ref 4.2–5.9)
SODIUM BLD-SCNC: 135 MMOL/L (ref 136–145)
SPECIFIC GRAVITY UA: >1.03 (ref 1–1.03)
TOTAL PROTEIN: 6.9 G/DL (ref 6.4–8.2)
TROPONIN: <0.01 NG/ML
URINE REFLEX TO CULTURE: ABNORMAL
URINE TYPE: ABNORMAL
UROBILINOGEN, URINE: 0.2 E.U./DL
WBC # BLD: 9.6 K/UL (ref 4–11)

## 2019-08-13 PROCEDURE — 93010 ELECTROCARDIOGRAM REPORT: CPT | Performed by: INTERNAL MEDICINE

## 2019-08-13 PROCEDURE — 80053 COMPREHEN METABOLIC PANEL: CPT

## 2019-08-13 PROCEDURE — 99285 EMERGENCY DEPT VISIT HI MDM: CPT

## 2019-08-13 PROCEDURE — 36415 COLL VENOUS BLD VENIPUNCTURE: CPT

## 2019-08-13 PROCEDURE — 71046 X-RAY EXAM CHEST 2 VIEWS: CPT

## 2019-08-13 PROCEDURE — 81003 URINALYSIS AUTO W/O SCOPE: CPT

## 2019-08-13 PROCEDURE — 93005 ELECTROCARDIOGRAM TRACING: CPT | Performed by: EMERGENCY MEDICINE

## 2019-08-13 PROCEDURE — 84484 ASSAY OF TROPONIN QUANT: CPT

## 2019-08-13 PROCEDURE — 85025 COMPLETE CBC W/AUTO DIFF WBC: CPT

## 2019-08-13 RX ORDER — NICOTINE POLACRILEX 4 MG
15 LOZENGE BUCCAL PRN
Qty: 45 G | Refills: 1 | Status: SHIPPED | OUTPATIENT
Start: 2019-08-13

## 2019-08-13 ASSESSMENT — PAIN DESCRIPTION - PAIN TYPE: TYPE: CHRONIC PAIN

## 2019-08-13 ASSESSMENT — PAIN DESCRIPTION - DESCRIPTORS: DESCRIPTORS: ACHING

## 2019-08-13 ASSESSMENT — PAIN DESCRIPTION - LOCATION: LOCATION: BACK

## 2019-08-13 ASSESSMENT — PAIN SCALES - GENERAL: PAINLEVEL_OUTOF10: 3

## 2019-09-16 LAB
ANION GAP SERPL CALCULATED.3IONS-SCNC: 13 MMOL/L (ref 3–16)
BASOPHILS ABSOLUTE: 0.1 K/UL (ref 0–0.2)
BASOPHILS RELATIVE PERCENT: 0.7 %
BUN BLDV-MCNC: 13 MG/DL (ref 7–20)
CALCIUM SERPL-MCNC: 9.7 MG/DL (ref 8.3–10.6)
CHLORIDE BLD-SCNC: 100 MMOL/L (ref 99–110)
CO2: 26 MMOL/L (ref 21–32)
CREAT SERPL-MCNC: 1 MG/DL (ref 0.8–1.3)
EOSINOPHILS ABSOLUTE: 0.2 K/UL (ref 0–0.6)
EOSINOPHILS RELATIVE PERCENT: 2.4 %
GFR AFRICAN AMERICAN: >60
GFR NON-AFRICAN AMERICAN: >60
GLUCOSE BLD-MCNC: 215 MG/DL (ref 70–99)
HCT VFR BLD CALC: 42.5 % (ref 40.5–52.5)
HEMOGLOBIN: 14.5 G/DL (ref 13.5–17.5)
LYMPHOCYTES ABSOLUTE: 2.2 K/UL (ref 1–5.1)
LYMPHOCYTES RELATIVE PERCENT: 31.8 %
MCH RBC QN AUTO: 28.6 PG (ref 26–34)
MCHC RBC AUTO-ENTMCNC: 34.1 G/DL (ref 31–36)
MCV RBC AUTO: 84 FL (ref 80–100)
MONOCYTES ABSOLUTE: 0.6 K/UL (ref 0–1.3)
MONOCYTES RELATIVE PERCENT: 9.2 %
NEUTROPHILS ABSOLUTE: 3.8 K/UL (ref 1.7–7.7)
NEUTROPHILS RELATIVE PERCENT: 55.9 %
PDW BLD-RTO: 14.2 % (ref 12.4–15.4)
PLATELET # BLD: 170 K/UL (ref 135–450)
PMV BLD AUTO: 8.7 FL (ref 5–10.5)
POTASSIUM SERPL-SCNC: 4.4 MMOL/L (ref 3.5–5.1)
RBC # BLD: 5.06 M/UL (ref 4.2–5.9)
SODIUM BLD-SCNC: 139 MMOL/L (ref 136–145)
WBC # BLD: 6.8 K/UL (ref 4–11)

## 2019-09-19 ENCOUNTER — HOSPITAL ENCOUNTER (OUTPATIENT)
Dept: CT IMAGING | Age: 64
Discharge: HOME | End: 2019-09-19
Payer: COMMERCIAL

## 2019-09-19 DIAGNOSIS — K50.919 ACUTE CROHN'S DISEASE WITH COMPLICATION (HCC): ICD-10-CM

## 2019-09-19 LAB
QUANTI TB GOLD PLUS: NEGATIVE
QUANTI TB1 MINUS NIL: 0 IU/ML (ref 0–0.34)
QUANTI TB2 MINUS NIL: 0 IU/ML (ref 0–0.34)
QUANTIFERON MITOGEN: >10 IU/ML
QUANTIFERON NIL: 0.23 IU/ML

## 2019-09-19 PROCEDURE — 74177 CT ABD & PELVIS W/CONTRAST: CPT

## 2019-09-19 PROCEDURE — 6360000004 HC RX CONTRAST MEDICATION

## 2019-09-19 RX ADMIN — IOPAMIDOL 75 ML: 755 INJECTION, SOLUTION INTRAVENOUS at 09:06

## 2019-09-19 RX ADMIN — IOHEXOL 50 ML: 240 INJECTION, SOLUTION INTRATHECAL; INTRAVASCULAR; INTRAVENOUS; ORAL at 09:05

## 2019-11-01 ENCOUNTER — OFFICE VISIT (OUTPATIENT)
Dept: CARDIOLOGY CLINIC | Age: 64
End: 2019-11-01
Payer: COMMERCIAL

## 2019-11-01 VITALS
DIASTOLIC BLOOD PRESSURE: 80 MMHG | BODY MASS INDEX: 33.75 KG/M2 | OXYGEN SATURATION: 97 % | WEIGHT: 263 LBS | HEART RATE: 86 BPM | SYSTOLIC BLOOD PRESSURE: 120 MMHG | HEIGHT: 74 IN

## 2019-11-01 DIAGNOSIS — E78.5 HYPERLIPIDEMIA LDL GOAL <70: ICD-10-CM

## 2019-11-01 DIAGNOSIS — Z01.810 PREOPERATIVE CARDIOVASCULAR EXAMINATION: Primary | ICD-10-CM

## 2019-11-01 DIAGNOSIS — I71.21 ASCENDING AORTIC ANEURYSM: ICD-10-CM

## 2019-11-01 DIAGNOSIS — I25.10 CORONARY ARTERY DISEASE INVOLVING NATIVE CORONARY ARTERY OF NATIVE HEART WITHOUT ANGINA PECTORIS: ICD-10-CM

## 2019-11-01 DIAGNOSIS — R91.1 PULMONARY NODULE: ICD-10-CM

## 2019-11-01 PROCEDURE — 93000 ELECTROCARDIOGRAM COMPLETE: CPT | Performed by: INTERNAL MEDICINE

## 2019-11-01 PROCEDURE — 99214 OFFICE O/P EST MOD 30 MIN: CPT | Performed by: INTERNAL MEDICINE

## 2019-11-04 ENCOUNTER — TELEPHONE (OUTPATIENT)
Dept: CARDIOLOGY CLINIC | Age: 64
End: 2019-11-04

## 2019-12-05 ENCOUNTER — APPOINTMENT (OUTPATIENT)
Dept: CT IMAGING | Age: 64
End: 2019-12-05
Payer: COMMERCIAL

## 2019-12-05 ENCOUNTER — HOSPITAL ENCOUNTER (EMERGENCY)
Age: 64
Discharge: HOME | End: 2019-12-05
Attending: EMERGENCY MEDICINE
Payer: COMMERCIAL

## 2019-12-05 VITALS
RESPIRATION RATE: 17 BRPM | BODY MASS INDEX: 32.54 KG/M2 | HEIGHT: 74 IN | TEMPERATURE: 97.7 F | HEART RATE: 89 BPM | SYSTOLIC BLOOD PRESSURE: 114 MMHG | OXYGEN SATURATION: 96 % | WEIGHT: 253.53 LBS | DIASTOLIC BLOOD PRESSURE: 67 MMHG

## 2019-12-05 DIAGNOSIS — R11.2 NAUSEA AND VOMITING, INTRACTABILITY OF VOMITING NOT SPECIFIED, UNSPECIFIED VOMITING TYPE: ICD-10-CM

## 2019-12-05 DIAGNOSIS — R10.12 ABDOMINAL PAIN, LEFT UPPER QUADRANT: ICD-10-CM

## 2019-12-05 DIAGNOSIS — E83.42 HYPOMAGNESEMIA: ICD-10-CM

## 2019-12-05 DIAGNOSIS — R19.7 DIARRHEA, UNSPECIFIED TYPE: Primary | ICD-10-CM

## 2019-12-05 LAB
A/G RATIO: 0.9 (ref 1.1–2.2)
ALBUMIN SERPL-MCNC: 3.7 G/DL (ref 3.4–5)
ALP BLD-CCNC: 127 U/L (ref 40–129)
ALT SERPL-CCNC: 33 U/L (ref 10–40)
ANION GAP SERPL CALCULATED.3IONS-SCNC: 14 MMOL/L (ref 3–16)
AST SERPL-CCNC: 21 U/L (ref 15–37)
BASOPHILS ABSOLUTE: 0 K/UL (ref 0–0.2)
BASOPHILS RELATIVE PERCENT: 0.6 %
BILIRUB SERPL-MCNC: 0.6 MG/DL (ref 0–1)
BILIRUBIN URINE: ABNORMAL
BLOOD, URINE: NEGATIVE
BUN BLDV-MCNC: 16 MG/DL (ref 7–20)
C DIFF TOXIN/ANTIGEN: NORMAL
CALCIUM SERPL-MCNC: 9 MG/DL (ref 8.3–10.6)
CHLORIDE BLD-SCNC: 108 MMOL/L (ref 99–110)
CLARITY: CLEAR
CO2: 17 MMOL/L (ref 21–32)
COLOR: ABNORMAL
CREAT SERPL-MCNC: 0.9 MG/DL (ref 0.8–1.3)
CRYPTOSPORIDIUM ANTIGEN STOOL: NORMAL
E HISTOLYTICA ANTIGEN STOOL: NORMAL
EKG ATRIAL RATE: 91 BPM
EKG DIAGNOSIS: NORMAL
EKG P AXIS: 49 DEGREES
EKG P-R INTERVAL: 172 MS
EKG Q-T INTERVAL: 356 MS
EKG QRS DURATION: 104 MS
EKG QTC CALCULATION (BAZETT): 437 MS
EKG R AXIS: 31 DEGREES
EKG T AXIS: -15 DEGREES
EKG VENTRICULAR RATE: 91 BPM
EOSINOPHILS ABSOLUTE: 0.1 K/UL (ref 0–0.6)
EOSINOPHILS RELATIVE PERCENT: 1.4 %
EPITHELIAL CELLS, UA: 1 /HPF (ref 0–5)
GFR AFRICAN AMERICAN: >60
GFR NON-AFRICAN AMERICAN: >60
GIARDIA ANTIGEN STOOL: NORMAL
GLOBULIN: 4 G/DL
GLUCOSE BLD-MCNC: 243 MG/DL (ref 70–99)
GLUCOSE URINE: 250 MG/DL
HCT VFR BLD CALC: 46.5 % (ref 40.5–52.5)
HEMOGLOBIN: 15.6 G/DL (ref 13.5–17.5)
HYALINE CASTS: 13 /LPF (ref 0–8)
KETONES, URINE: NEGATIVE MG/DL
LEUKOCYTE ESTERASE, URINE: NEGATIVE
LIPASE: 21 U/L (ref 13–60)
LYMPHOCYTES ABSOLUTE: 1.6 K/UL (ref 1–5.1)
LYMPHOCYTES RELATIVE PERCENT: 22.8 %
MAGNESIUM: 1.5 MG/DL (ref 1.8–2.4)
MCH RBC QN AUTO: 28 PG (ref 26–34)
MCHC RBC AUTO-ENTMCNC: 33.6 G/DL (ref 31–36)
MCV RBC AUTO: 83.3 FL (ref 80–100)
MICROSCOPIC EXAMINATION: YES
MONOCYTES ABSOLUTE: 0.8 K/UL (ref 0–1.3)
MONOCYTES RELATIVE PERCENT: 10.9 %
NEUTROPHILS ABSOLUTE: 4.6 K/UL (ref 1.7–7.7)
NEUTROPHILS RELATIVE PERCENT: 64.3 %
NITRITE, URINE: NEGATIVE
PDW BLD-RTO: 14.3 % (ref 12.4–15.4)
PH UA: 6 (ref 5–8)
PLATELET # BLD: 173 K/UL (ref 135–450)
PMV BLD AUTO: 8.1 FL (ref 5–10.5)
POTASSIUM REFLEX MAGNESIUM: 3.5 MMOL/L (ref 3.5–5.1)
PROTEIN UA: 30 MG/DL
RBC # BLD: 5.58 M/UL (ref 4.2–5.9)
RBC UA: 2 /HPF (ref 0–4)
SODIUM BLD-SCNC: 139 MMOL/L (ref 136–145)
SPECIFIC GRAVITY UA: 1.03 (ref 1–1.03)
TOTAL PROTEIN: 7.7 G/DL (ref 6.4–8.2)
TROPONIN: <0.01 NG/ML
URINE REFLEX TO CULTURE: ABNORMAL
URINE TYPE: ABNORMAL
UROBILINOGEN, URINE: 0.2 E.U./DL
WBC # BLD: 7.2 K/UL (ref 4–11)
WBC UA: 1 /HPF (ref 0–5)

## 2019-12-05 PROCEDURE — 83735 ASSAY OF MAGNESIUM: CPT

## 2019-12-05 PROCEDURE — 36415 COLL VENOUS BLD VENIPUNCTURE: CPT

## 2019-12-05 PROCEDURE — 87328 CRYPTOSPORIDIUM AG IA: CPT

## 2019-12-05 PROCEDURE — 84484 ASSAY OF TROPONIN QUANT: CPT

## 2019-12-05 PROCEDURE — 93010 ELECTROCARDIOGRAM REPORT: CPT | Performed by: INTERNAL MEDICINE

## 2019-12-05 PROCEDURE — 2580000003 HC RX 258: Performed by: NURSE PRACTITIONER

## 2019-12-05 PROCEDURE — 96374 THER/PROPH/DIAG INJ IV PUSH: CPT

## 2019-12-05 PROCEDURE — 85025 COMPLETE CBC W/AUTO DIFF WBC: CPT

## 2019-12-05 PROCEDURE — 6360000004 HC RX CONTRAST MEDICATION: Performed by: NURSE PRACTITIONER

## 2019-12-05 PROCEDURE — 87324 CLOSTRIDIUM AG IA: CPT

## 2019-12-05 PROCEDURE — 6360000002 HC RX W HCPCS: Performed by: NURSE PRACTITIONER

## 2019-12-05 PROCEDURE — 87336 ENTAMOEB HIST DISPR AG IA: CPT

## 2019-12-05 PROCEDURE — 74177 CT ABD & PELVIS W/CONTRAST: CPT

## 2019-12-05 PROCEDURE — 96361 HYDRATE IV INFUSION ADD-ON: CPT

## 2019-12-05 PROCEDURE — 87449 NOS EACH ORGANISM AG IA: CPT

## 2019-12-05 PROCEDURE — 99285 EMERGENCY DEPT VISIT HI MDM: CPT

## 2019-12-05 PROCEDURE — 80053 COMPREHEN METABOLIC PANEL: CPT

## 2019-12-05 PROCEDURE — 83690 ASSAY OF LIPASE: CPT

## 2019-12-05 PROCEDURE — 93005 ELECTROCARDIOGRAM TRACING: CPT | Performed by: NURSE PRACTITIONER

## 2019-12-05 PROCEDURE — 81001 URINALYSIS AUTO W/SCOPE: CPT

## 2019-12-05 PROCEDURE — 87505 NFCT AGENT DETECTION GI: CPT

## 2019-12-05 PROCEDURE — 96375 TX/PRO/DX INJ NEW DRUG ADDON: CPT

## 2019-12-05 RX ORDER — CHOLESTYRAMINE 4 G/9G
1 POWDER, FOR SUSPENSION ORAL
Qty: 30 PACKET | Refills: 0 | Status: SHIPPED | OUTPATIENT
Start: 2019-12-05 | End: 2020-03-11 | Stop reason: ALTCHOICE

## 2019-12-05 RX ORDER — 0.9 % SODIUM CHLORIDE 0.9 %
1000 INTRAVENOUS SOLUTION INTRAVENOUS ONCE
Status: COMPLETED | OUTPATIENT
Start: 2019-12-05 | End: 2019-12-05

## 2019-12-05 RX ORDER — MORPHINE SULFATE 4 MG/ML
4 INJECTION, SOLUTION INTRAMUSCULAR; INTRAVENOUS ONCE
Status: COMPLETED | OUTPATIENT
Start: 2019-12-05 | End: 2019-12-05

## 2019-12-05 RX ORDER — ONDANSETRON 2 MG/ML
4 INJECTION INTRAMUSCULAR; INTRAVENOUS ONCE
Status: COMPLETED | OUTPATIENT
Start: 2019-12-05 | End: 2019-12-05

## 2019-12-05 RX ADMIN — ONDANSETRON 4 MG: 2 INJECTION INTRAMUSCULAR; INTRAVENOUS at 16:07

## 2019-12-05 RX ADMIN — SODIUM CHLORIDE 1000 ML: 9 INJECTION, SOLUTION INTRAVENOUS at 16:14

## 2019-12-05 RX ADMIN — IOPAMIDOL 75 ML: 755 INJECTION, SOLUTION INTRAVENOUS at 15:28

## 2019-12-05 RX ADMIN — MORPHINE SULFATE 4 MG: 4 INJECTION, SOLUTION INTRAMUSCULAR; INTRAVENOUS at 16:14

## 2019-12-05 ASSESSMENT — PAIN SCALES - GENERAL
PAINLEVEL_OUTOF10: 7
PAINLEVEL_OUTOF10: 2
PAINLEVEL_OUTOF10: 3
PAINLEVEL_OUTOF10: 3

## 2019-12-05 ASSESSMENT — PAIN DESCRIPTION - LOCATION: LOCATION: ABDOMEN

## 2019-12-05 ASSESSMENT — PAIN DESCRIPTION - ORIENTATION: ORIENTATION: LOWER;MID

## 2019-12-05 ASSESSMENT — PAIN DESCRIPTION - DESCRIPTORS: DESCRIPTORS: ACHING

## 2019-12-05 ASSESSMENT — PAIN DESCRIPTION - PROGRESSION: CLINICAL_PROGRESSION: NOT CHANGED

## 2019-12-05 ASSESSMENT — PAIN DESCRIPTION - FREQUENCY: FREQUENCY: CONTINUOUS

## 2019-12-05 ASSESSMENT — PAIN - FUNCTIONAL ASSESSMENT: PAIN_FUNCTIONAL_ASSESSMENT: PREVENTS OR INTERFERES SOME ACTIVE ACTIVITIES AND ADLS

## 2019-12-05 ASSESSMENT — PAIN DESCRIPTION - ONSET: ONSET: ON-GOING

## 2019-12-05 ASSESSMENT — PAIN DESCRIPTION - PAIN TYPE: TYPE: ACUTE PAIN

## 2019-12-06 LAB — GI BACTERIAL PATHOGENS BY PCR: NORMAL

## 2020-02-18 ENCOUNTER — HOSPITAL ENCOUNTER (OUTPATIENT)
Dept: CT IMAGING | Age: 65
Discharge: HOME | End: 2020-02-18
Payer: MEDICARE

## 2020-02-18 PROCEDURE — G0297 LDCT FOR LUNG CA SCREEN: HCPCS

## 2020-07-07 ENCOUNTER — OFFICE VISIT (OUTPATIENT)
Dept: FAMILY MEDICINE CLINIC | Age: 65
End: 2020-07-07
Payer: MEDICARE

## 2020-07-07 VITALS
HEIGHT: 74 IN | TEMPERATURE: 97.8 F | BODY MASS INDEX: 34.78 KG/M2 | WEIGHT: 271 LBS | SYSTOLIC BLOOD PRESSURE: 132 MMHG | DIASTOLIC BLOOD PRESSURE: 78 MMHG

## 2020-07-07 LAB — HBA1C MFR BLD: 12.2 %

## 2020-07-07 PROCEDURE — G8417 CALC BMI ABV UP PARAM F/U: HCPCS | Performed by: FAMILY MEDICINE

## 2020-07-07 PROCEDURE — 3017F COLORECTAL CA SCREEN DOC REV: CPT | Performed by: FAMILY MEDICINE

## 2020-07-07 PROCEDURE — 2022F DILAT RTA XM EVC RTNOPTHY: CPT | Performed by: FAMILY MEDICINE

## 2020-07-07 PROCEDURE — G8427 DOCREV CUR MEDS BY ELIG CLIN: HCPCS | Performed by: FAMILY MEDICINE

## 2020-07-07 PROCEDURE — 99214 OFFICE O/P EST MOD 30 MIN: CPT | Performed by: FAMILY MEDICINE

## 2020-07-07 PROCEDURE — 83036 HEMOGLOBIN GLYCOSYLATED A1C: CPT | Performed by: FAMILY MEDICINE

## 2020-07-07 PROCEDURE — 1036F TOBACCO NON-USER: CPT | Performed by: FAMILY MEDICINE

## 2020-07-07 PROCEDURE — 3046F HEMOGLOBIN A1C LEVEL >9.0%: CPT | Performed by: FAMILY MEDICINE

## 2020-07-07 RX ORDER — NITROGLYCERIN 0.4 MG/1
0.4 TABLET SUBLINGUAL EVERY 5 MIN PRN
Qty: 25 TABLET | Refills: 3 | Status: SHIPPED | OUTPATIENT
Start: 2020-07-07

## 2020-07-07 NOTE — PROGRESS NOTES
2020     Sherri Riley (:  1955) is a 60 y.o. male, here for evaluation of the following medical concerns:  Patient presented today to establish care with pcp. HPI     1. Establish care- patient stated that he is needing to establish with new provider due to his former PCP leaving the area. Patient stated that he is retired and denied use of tobacco, alcohol, and drugs. 2.   CAD-  NSTEMI- , follows with Dr. Halima Mittal, last visit was in , Hospitalized from 2017-8/15/2017 with NSTEMI. He underwent cardiac cath which resulted in SHAN x2 to RCA. LVEF 35% initially and follow up echo showing LVEF 55-60% (done after PCI). 3.  DM II-  Takes lantus 38 at night, patient has A1C that was 7.8 three months ago, an improved from 9.8, checked today and it is 12. 2! He was supposed to be on 40 units but stopped? Needs to return to 40 units, glipizide 5 mg daily,  Metformin 500 BID, needs eye exam and foot exam    4. Hyperlipidemia- taking medication as prescribed, feels this is well controlled, last LDL is 27, HDL is 48, no side effects from the medication. Today, denied chest pain, sob, n, v, or diarrhea. Review of Systems   Constitutional: Negative for activity change, fatigue, fever and unexpected weight change. HENT: Negative for congestion, ear pain and rhinorrhea. Respiratory: Negative for shortness of breath. Cardiovascular: Negative for chest pain. Gastrointestinal: Negative for abdominal pain, diarrhea, nausea and vomiting. Endocrine: Negative for cold intolerance, heat intolerance, polydipsia, polyphagia and polyuria. Musculoskeletal: Negative for arthralgias. Skin: Negative for color change. Neurological: Negative for headaches. Psychiatric/Behavioral: Negative for dysphoric mood. The patient is not nervous/anxious. Prior to Visit Medications    Medication Sig Taking?  Authorizing Provider   nitroGLYCERIN (NITROSTAT) 0.4 MG SL tablet Place 1 tablet under the tongue every 5 minutes as needed for Chest pain Yes Edwin Bond DO   mesalamine (PENTASA) 500 MG extended release capsule Take 2 capsules by mouth 2 times daily Yes Sherryle Maryland, MD   glipiZIDE (GLUCOTROL XL) 5 MG extended release tablet Take 1 tablet by mouth daily Yes Sherryle Maryland, MD   metoprolol succinate (TOPROL XL) 25 MG extended release tablet Take 0.5 tablets by mouth nightly Yes Jose Antonio Palma MD   metFORMIN (GLUCOPHAGE-XR) 500 MG extended release tablet Take 1 tablet by mouth 2 times daily (with meals) Yes Sherryle Maryland, MD   furosemide (LASIX) 20 MG tablet TAKE 1 TABLET DAILY Yes Sherryle Maryland, MD   insulin glargine (LANTUS SOLOSTAR) 100 UNIT/ML injection pen 40 units daily  Patient taking differently: 38 Units 40 units daily Yes Sherryle Maryland, MD   atorvastatin (LIPITOR) 40 MG tablet Take 1 tablet by mouth nightly Yes Sherryle Maryland, MD   inFLIXimab (REMICADE) 100 MG injection Infuse 5 mg/kg intravenously See Admin Instructions q 6-8 wks Yes Historical Provider, MD   aspirin 81 MG chewable tablet Take 1 tablet by mouth daily Yes Florencia Ryder MD   Insulin Pen Needle (B-D UF III MINI PEN NEEDLES) 31G X 5 MM MISC 4 times a day  Sherryle Maryland, MD   glucose (GLUTOSE) 40 % GEL Take 37.5 mLs by mouth as needed (low blood sugar (less than 70))  Marimar Rouse MD   Insulin Pen Needle (PEN NEEDLES) 32G X 4 MM MISC 1 Container by Does not apply route every morning (before breakfast)  Sherryle Maryland, MD   FREESTYLE LANCETS MISC 1 each by Does not apply route daily  Sherryle Maryland, MD   glucose monitoring kit (FREESTYLE) monitoring kit 1 kit by Does not apply route daily as needed (BS checks)  Florencia Ryder MD   glucose blood VI test strips (FREESTYLE TEST STRIPS) strip 1 each by In Vitro route daily As needed.   Florencia Ryder MD        Social History     Tobacco Use    Smoking status: Former Smoker     Packs/day: 1.50     Years: 10.00     Pack years: 15.00     Types: Cigarettes     Last attempt for Chest pain  Dispense: 25 tablet; Refill: 3  - POCT glycosylated hemoglobin (Hb A1C)    3. Coronary artery disease involving native coronary artery of native heart without angina pectoris  Take medication as prescribed. Discussed the need to exercise 30 minutes each day. Monitor diet, avoid fried foods etc... Educated on need to reduce cholesterol in diet and results of poor diet. Stable  Continue with medication  Keep appointments with specialist.   Nery Sales questions  - nitroGLYCERIN (NITROSTAT) 0.4 MG SL tablet; Place 1 tablet under the tongue every 5 minutes as needed for Chest pain  Dispense: 25 tablet; Refill: 3  - POCT glycosylated hemoglobin (Hb A1C)    4. Type 2 diabetes mellitus without complication, without long-term current use of insulin ([address]  Patient will need to keep a log of his glucose readings and bring them to the office. Needs to increased Lantus to 40 units and increase as he checks his glucose daily  Increase Metformin to 1000 bid. Glipizide to BID  He needs to monitor his diet and exercise. Attempt to lose weight. Discussed proper eating habits. Continue to take medication as prescribed. Will obtain A1C at this visit. Educated on signs and symptoms for immediate evaluation in the ER. Return in about 3 months (around 10/7/2020).

## 2020-07-08 ENCOUNTER — TELEPHONE (OUTPATIENT)
Dept: FAMILY MEDICINE CLINIC | Age: 65
End: 2020-07-08

## 2020-07-12 ASSESSMENT — ENCOUNTER SYMPTOMS
VOMITING: 0
RHINORRHEA: 0
COLOR CHANGE: 0
SHORTNESS OF BREATH: 0
DIARRHEA: 0
ABDOMINAL PAIN: 0
NAUSEA: 0

## 2020-07-16 ENCOUNTER — TELEPHONE (OUTPATIENT)
Dept: FAMILY MEDICINE CLINIC | Age: 65
End: 2020-07-16

## 2020-07-16 RX ORDER — PEN NEEDLE, DIABETIC 30 GX3/16"
1 NEEDLE, DISPOSABLE MISCELLANEOUS
Qty: 200 EACH | Refills: 0 | Status: SHIPPED | OUTPATIENT
Start: 2020-07-16 | End: 2021-03-18 | Stop reason: SDUPTHER

## 2020-07-16 RX ORDER — INSULIN GLARGINE 100 [IU]/ML
INJECTION, SOLUTION SUBCUTANEOUS
Qty: 30 ML | Refills: 0 | Status: SHIPPED | OUTPATIENT
Start: 2020-07-16 | End: 2020-10-27 | Stop reason: SDUPTHER

## 2020-07-16 NOTE — TELEPHONE ENCOUNTER
Patient left his Lantus Insulin at home and they are at the [address] in [facility]. Wife requesting if Dr. Rossi Quesada could send over 1 pen to [facility] in [facility]. Please return call. Agnes Carter

## 2020-08-20 RX ORDER — METFORMIN HYDROCHLORIDE 500 MG/1
500 TABLET, EXTENDED RELEASE ORAL 2 TIMES DAILY WITH MEALS
Qty: 60 TABLET | Refills: 2 | Status: SHIPPED | OUTPATIENT
Start: 2020-08-20 | End: 2020-11-01

## 2020-10-05 ENCOUNTER — TELEPHONE (OUTPATIENT)
Dept: FAMILY MEDICINE CLINIC | Age: 65
End: 2020-10-05

## 2020-10-05 NOTE — TELEPHONE ENCOUNTER
Patients wife Tram Martin) is calling stating starting on Friday he is having a cough, pressure in chest, chills and fatigue then on sat he has two rashes on his right buttock the size of a soft balls would like to be seen today can we do a virtual with him today please call back.  Wife thinks he needs to be tested for covid

## 2020-10-06 ENCOUNTER — TELEPHONE (OUTPATIENT)
Dept: FAMILY MEDICINE CLINIC | Age: 65
End: 2020-10-06

## 2020-10-06 NOTE — TELEPHONE ENCOUNTER
Let the patient know I'm out of town and he was told to follow up in the ER and he should be evaluated for his symptoms.   Dr. Hidalgo Kate

## 2020-10-06 NOTE — TELEPHONE ENCOUNTER
Patient states now all his symptoms are gone the only thing that is going on currently is the rash he has three rashes on the right buttock lower back

## 2020-10-07 ENCOUNTER — APPOINTMENT (OUTPATIENT)
Dept: CT IMAGING | Age: 65
DRG: 603 | End: 2020-10-07
Payer: MEDICARE

## 2020-10-07 ENCOUNTER — HOSPITAL ENCOUNTER (INPATIENT)
Age: 65
LOS: 2 days | Discharge: HOME | DRG: 603 | End: 2020-10-09
Attending: EMERGENCY MEDICINE | Admitting: INTERNAL MEDICINE
Payer: MEDICARE

## 2020-10-07 PROBLEM — L03.90 CELLULITIS: Status: ACTIVE | Noted: 2020-10-07

## 2020-10-07 LAB
A/G RATIO: 0.9 (ref 1.1–2.2)
ALBUMIN SERPL-MCNC: 3.6 G/DL (ref 3.4–5)
ALP BLD-CCNC: 126 U/L (ref 40–129)
ALT SERPL-CCNC: 24 U/L (ref 10–40)
ANION GAP SERPL CALCULATED.3IONS-SCNC: 12 MMOL/L (ref 3–16)
AST SERPL-CCNC: 21 U/L (ref 15–37)
BASOPHILS ABSOLUTE: 0.1 K/UL (ref 0–0.2)
BASOPHILS RELATIVE PERCENT: 0.8 %
BILIRUB SERPL-MCNC: 0.7 MG/DL (ref 0–1)
BILIRUBIN URINE: NEGATIVE
BLOOD, URINE: NEGATIVE
BUN BLDV-MCNC: 18 MG/DL (ref 7–20)
C-REACTIVE PROTEIN: 26.2 MG/L (ref 0–5.1)
CALCIUM SERPL-MCNC: 9.7 MG/DL (ref 8.3–10.6)
CHLORIDE BLD-SCNC: 98 MMOL/L (ref 99–110)
CLARITY: CLEAR
CO2: 24 MMOL/L (ref 21–32)
COLOR: YELLOW
CREAT SERPL-MCNC: 1 MG/DL (ref 0.8–1.3)
EKG ATRIAL RATE: 77 BPM
EKG DIAGNOSIS: NORMAL
EKG P AXIS: 19 DEGREES
EKG P-R INTERVAL: 172 MS
EKG Q-T INTERVAL: 356 MS
EKG QRS DURATION: 90 MS
EKG QTC CALCULATION (BAZETT): 402 MS
EKG R AXIS: 14 DEGREES
EKG T AXIS: 16 DEGREES
EKG VENTRICULAR RATE: 77 BPM
EOSINOPHILS ABSOLUTE: 0.1 K/UL (ref 0–0.6)
EOSINOPHILS RELATIVE PERCENT: 1.2 %
GFR AFRICAN AMERICAN: >60
GFR NON-AFRICAN AMERICAN: >60
GLOBULIN: 4 G/DL
GLUCOSE BLD-MCNC: 302 MG/DL (ref 70–99)
GLUCOSE BLD-MCNC: 331 MG/DL (ref 70–99)
GLUCOSE BLD-MCNC: 421 MG/DL (ref 70–99)
GLUCOSE URINE: >=1000 MG/DL
HCT VFR BLD CALC: 42 % (ref 40.5–52.5)
HEMOGLOBIN: 14.1 G/DL (ref 13.5–17.5)
KETONES, URINE: NEGATIVE MG/DL
LACTIC ACID: 2.2 MMOL/L (ref 0.4–2)
LEUKOCYTE ESTERASE, URINE: NEGATIVE
LYMPHOCYTES ABSOLUTE: 1.7 K/UL (ref 1–5.1)
LYMPHOCYTES RELATIVE PERCENT: 23.1 %
MCH RBC QN AUTO: 28.4 PG (ref 26–34)
MCHC RBC AUTO-ENTMCNC: 33.6 G/DL (ref 31–36)
MCV RBC AUTO: 84.7 FL (ref 80–100)
MICROSCOPIC EXAMINATION: ABNORMAL
MONOCYTES ABSOLUTE: 0.7 K/UL (ref 0–1.3)
MONOCYTES RELATIVE PERCENT: 10.1 %
NEUTROPHILS ABSOLUTE: 4.7 K/UL (ref 1.7–7.7)
NEUTROPHILS RELATIVE PERCENT: 64.8 %
NITRITE, URINE: NEGATIVE
PDW BLD-RTO: 14.1 % (ref 12.4–15.4)
PERFORMED ON: ABNORMAL
PERFORMED ON: ABNORMAL
PH UA: 5.5 (ref 5–8)
PLATELET # BLD: 153 K/UL (ref 135–450)
PMV BLD AUTO: 8.5 FL (ref 5–10.5)
POTASSIUM REFLEX MAGNESIUM: 4.3 MMOL/L (ref 3.5–5.1)
PROTEIN UA: NEGATIVE MG/DL
RBC # BLD: 4.97 M/UL (ref 4.2–5.9)
SODIUM BLD-SCNC: 134 MMOL/L (ref 136–145)
SPECIFIC GRAVITY UA: >1.03 (ref 1–1.03)
TOTAL PROTEIN: 7.6 G/DL (ref 6.4–8.2)
TROPONIN: <0.01 NG/ML
URINE REFLEX TO CULTURE: ABNORMAL
URINE TYPE: ABNORMAL
UROBILINOGEN, URINE: 0.2 E.U./DL
WBC # BLD: 7.2 K/UL (ref 4–11)

## 2020-10-07 PROCEDURE — 6370000000 HC RX 637 (ALT 250 FOR IP): Performed by: INTERNAL MEDICINE

## 2020-10-07 PROCEDURE — 6360000004 HC RX CONTRAST MEDICATION: Performed by: EMERGENCY MEDICINE

## 2020-10-07 PROCEDURE — 1200000000 HC SEMI PRIVATE

## 2020-10-07 PROCEDURE — 84484 ASSAY OF TROPONIN QUANT: CPT

## 2020-10-07 PROCEDURE — 86140 C-REACTIVE PROTEIN: CPT

## 2020-10-07 PROCEDURE — 93005 ELECTROCARDIOGRAM TRACING: CPT | Performed by: EMERGENCY MEDICINE

## 2020-10-07 PROCEDURE — 99285 EMERGENCY DEPT VISIT HI MDM: CPT

## 2020-10-07 PROCEDURE — 87040 BLOOD CULTURE FOR BACTERIA: CPT

## 2020-10-07 PROCEDURE — 81003 URINALYSIS AUTO W/O SCOPE: CPT

## 2020-10-07 PROCEDURE — 2500000003 HC RX 250 WO HCPCS: Performed by: EMERGENCY MEDICINE

## 2020-10-07 PROCEDURE — 2580000003 HC RX 258: Performed by: INTERNAL MEDICINE

## 2020-10-07 PROCEDURE — 80053 COMPREHEN METABOLIC PANEL: CPT

## 2020-10-07 PROCEDURE — 85025 COMPLETE CBC W/AUTO DIFF WBC: CPT

## 2020-10-07 PROCEDURE — 74177 CT ABD & PELVIS W/CONTRAST: CPT

## 2020-10-07 PROCEDURE — 83036 HEMOGLOBIN GLYCOSYLATED A1C: CPT

## 2020-10-07 PROCEDURE — 36415 COLL VENOUS BLD VENIPUNCTURE: CPT

## 2020-10-07 PROCEDURE — 93010 ELECTROCARDIOGRAM REPORT: CPT | Performed by: INTERNAL MEDICINE

## 2020-10-07 PROCEDURE — 83605 ASSAY OF LACTIC ACID: CPT

## 2020-10-07 PROCEDURE — 6360000002 HC RX W HCPCS: Performed by: INTERNAL MEDICINE

## 2020-10-07 PROCEDURE — 2580000003 HC RX 258: Performed by: EMERGENCY MEDICINE

## 2020-10-07 RX ORDER — ONDANSETRON 2 MG/ML
4 INJECTION INTRAMUSCULAR; INTRAVENOUS EVERY 6 HOURS PRN
Status: DISCONTINUED | OUTPATIENT
Start: 2020-10-07 | End: 2020-10-09 | Stop reason: HOSPADM

## 2020-10-07 RX ORDER — INSULIN GLARGINE 100 [IU]/ML
10 INJECTION, SOLUTION SUBCUTANEOUS NIGHTLY
Status: DISCONTINUED | OUTPATIENT
Start: 2020-10-07 | End: 2020-10-09 | Stop reason: HOSPADM

## 2020-10-07 RX ORDER — METOPROLOL SUCCINATE 25 MG/1
12.5 TABLET, EXTENDED RELEASE ORAL NIGHTLY
Status: DISCONTINUED | OUTPATIENT
Start: 2020-10-07 | End: 2020-10-09 | Stop reason: HOSPADM

## 2020-10-07 RX ORDER — SODIUM CHLORIDE 9 MG/ML
INJECTION, SOLUTION INTRAVENOUS CONTINUOUS
Status: DISCONTINUED | OUTPATIENT
Start: 2020-10-07 | End: 2020-10-09 | Stop reason: HOSPADM

## 2020-10-07 RX ORDER — CLINDAMYCIN PHOSPHATE 900 MG/50ML
900 INJECTION INTRAVENOUS ONCE
Status: COMPLETED | OUTPATIENT
Start: 2020-10-07 | End: 2020-10-07

## 2020-10-07 RX ORDER — POTASSIUM CHLORIDE 7.45 MG/ML
10 INJECTION INTRAVENOUS PRN
Status: DISCONTINUED | OUTPATIENT
Start: 2020-10-07 | End: 2020-10-09 | Stop reason: HOSPADM

## 2020-10-07 RX ORDER — MESALAMINE 500 MG/1
1000 CAPSULE, EXTENDED RELEASE ORAL 2 TIMES DAILY
Status: DISCONTINUED | OUTPATIENT
Start: 2020-10-07 | End: 2020-10-09 | Stop reason: HOSPADM

## 2020-10-07 RX ORDER — PROMETHAZINE HYDROCHLORIDE 25 MG/1
12.5 TABLET ORAL EVERY 6 HOURS PRN
Status: DISCONTINUED | OUTPATIENT
Start: 2020-10-07 | End: 2020-10-09 | Stop reason: HOSPADM

## 2020-10-07 RX ORDER — SODIUM CHLORIDE 0.9 % (FLUSH) 0.9 %
10 SYRINGE (ML) INJECTION PRN
Status: DISCONTINUED | OUTPATIENT
Start: 2020-10-07 | End: 2020-10-09 | Stop reason: HOSPADM

## 2020-10-07 RX ORDER — SODIUM CHLORIDE 0.9 % (FLUSH) 0.9 %
10 SYRINGE (ML) INJECTION EVERY 12 HOURS SCHEDULED
Status: DISCONTINUED | OUTPATIENT
Start: 2020-10-07 | End: 2020-10-09 | Stop reason: HOSPADM

## 2020-10-07 RX ORDER — DEXTROSE MONOHYDRATE 50 MG/ML
100 INJECTION, SOLUTION INTRAVENOUS PRN
Status: DISCONTINUED | OUTPATIENT
Start: 2020-10-07 | End: 2020-10-09 | Stop reason: HOSPADM

## 2020-10-07 RX ORDER — 0.9 % SODIUM CHLORIDE 0.9 %
1000 INTRAVENOUS SOLUTION INTRAVENOUS ONCE
Status: COMPLETED | OUTPATIENT
Start: 2020-10-07 | End: 2020-10-07

## 2020-10-07 RX ORDER — NICOTINE POLACRILEX 4 MG
15 LOZENGE BUCCAL PRN
Status: DISCONTINUED | OUTPATIENT
Start: 2020-10-07 | End: 2020-10-09 | Stop reason: HOSPADM

## 2020-10-07 RX ORDER — ASPIRIN 81 MG/1
81 TABLET, CHEWABLE ORAL DAILY
Status: DISCONTINUED | OUTPATIENT
Start: 2020-10-07 | End: 2020-10-09 | Stop reason: HOSPADM

## 2020-10-07 RX ORDER — NITROGLYCERIN 0.4 MG/1
0.4 TABLET SUBLINGUAL EVERY 5 MIN PRN
Status: DISCONTINUED | OUTPATIENT
Start: 2020-10-07 | End: 2020-10-09 | Stop reason: HOSPADM

## 2020-10-07 RX ORDER — ATORVASTATIN CALCIUM 40 MG/1
40 TABLET, FILM COATED ORAL NIGHTLY
Status: DISCONTINUED | OUTPATIENT
Start: 2020-10-07 | End: 2020-10-09 | Stop reason: HOSPADM

## 2020-10-07 RX ORDER — FUROSEMIDE 20 MG/1
20 TABLET ORAL DAILY
Status: DISCONTINUED | OUTPATIENT
Start: 2020-10-08 | End: 2020-10-07

## 2020-10-07 RX ORDER — MAGNESIUM SULFATE IN WATER 40 MG/ML
2 INJECTION, SOLUTION INTRAVENOUS PRN
Status: DISCONTINUED | OUTPATIENT
Start: 2020-10-07 | End: 2020-10-09 | Stop reason: HOSPADM

## 2020-10-07 RX ORDER — DEXTROSE MONOHYDRATE 25 G/50ML
12.5 INJECTION, SOLUTION INTRAVENOUS PRN
Status: DISCONTINUED | OUTPATIENT
Start: 2020-10-07 | End: 2020-10-09 | Stop reason: HOSPADM

## 2020-10-07 RX ADMIN — INSULIN LISPRO 8 UNITS: 100 INJECTION, SOLUTION INTRAVENOUS; SUBCUTANEOUS at 20:37

## 2020-10-07 RX ADMIN — MESALAMINE 1000 MG: 500 CAPSULE ORAL at 22:50

## 2020-10-07 RX ADMIN — ATORVASTATIN CALCIUM 40 MG: 40 TABLET, FILM COATED ORAL at 20:34

## 2020-10-07 RX ADMIN — ASPIRIN 81 MG: 81 TABLET, CHEWABLE ORAL at 18:59

## 2020-10-07 RX ADMIN — INSULIN LISPRO 4 UNITS: 100 INJECTION, SOLUTION INTRAVENOUS; SUBCUTANEOUS at 22:49

## 2020-10-07 RX ADMIN — VANCOMYCIN HYDROCHLORIDE 1250 MG: 10 INJECTION, POWDER, LYOPHILIZED, FOR SOLUTION INTRAVENOUS at 20:35

## 2020-10-07 RX ADMIN — SODIUM CHLORIDE 1000 ML: 9 INJECTION, SOLUTION INTRAVENOUS at 12:59

## 2020-10-07 RX ADMIN — INSULIN GLARGINE 10 UNITS: 100 INJECTION, SOLUTION SUBCUTANEOUS at 22:49

## 2020-10-07 RX ADMIN — CLINDAMYCIN IN 5 PERCENT DEXTROSE 900 MG: 18 INJECTION, SOLUTION INTRAVENOUS at 14:00

## 2020-10-07 RX ADMIN — IOPAMIDOL 75 ML: 755 INJECTION, SOLUTION INTRAVENOUS at 12:48

## 2020-10-07 RX ADMIN — METOPROLOL SUCCINATE 12.5 MG: 25 TABLET, EXTENDED RELEASE ORAL at 20:34

## 2020-10-07 RX ADMIN — SODIUM CHLORIDE: 9 INJECTION, SOLUTION INTRAVENOUS at 22:48

## 2020-10-07 ASSESSMENT — PAIN SCALES - GENERAL
PAINLEVEL_OUTOF10: 7
PAINLEVEL_OUTOF10: 7

## 2020-10-07 ASSESSMENT — PAIN DESCRIPTION - ONSET
ONSET: ON-GOING
ONSET: PROGRESSIVE

## 2020-10-07 ASSESSMENT — PAIN DESCRIPTION - DESCRIPTORS
DESCRIPTORS: ACHING
DESCRIPTORS: ACHING

## 2020-10-07 ASSESSMENT — ENCOUNTER SYMPTOMS
SORE THROAT: 0
EYE DISCHARGE: 0
ABDOMINAL PAIN: 0
NAUSEA: 1
VOMITING: 0
DIARRHEA: 0
WHEEZING: 0
EYE PAIN: 0
COUGH: 0
EYE REDNESS: 0
RHINORRHEA: 0
SHORTNESS OF BREATH: 0
BACK PAIN: 0

## 2020-10-07 ASSESSMENT — PAIN - FUNCTIONAL ASSESSMENT
PAIN_FUNCTIONAL_ASSESSMENT: ACTIVITIES ARE NOT PREVENTED
PAIN_FUNCTIONAL_ASSESSMENT: 0-10

## 2020-10-07 ASSESSMENT — PAIN DESCRIPTION - PAIN TYPE
TYPE: ACUTE PAIN
TYPE: ACUTE PAIN

## 2020-10-07 ASSESSMENT — PAIN DESCRIPTION - LOCATION
LOCATION: BACK
LOCATION: BACK

## 2020-10-07 ASSESSMENT — PAIN DESCRIPTION - FREQUENCY
FREQUENCY: CONTINUOUS
FREQUENCY: CONTINUOUS

## 2020-10-07 ASSESSMENT — PAIN DESCRIPTION - ORIENTATION
ORIENTATION: LOWER
ORIENTATION: LOWER

## 2020-10-07 ASSESSMENT — PAIN DESCRIPTION - PROGRESSION: CLINICAL_PROGRESSION: NOT CHANGED

## 2020-10-07 NOTE — ED NOTES
Bed: [facility] [[email or web address]  Expected date:   Expected time:   Means of arrival:   Comments:  Steven Duarte RN  10/07/20 1137

## 2020-10-07 NOTE — ED TRIAGE NOTES
Pt presents to ED via PV with wife with complaints of rash on lower back. Reports rash feels like a \"deep bruise. \" denies injury. Reports pain since Thursday. Pt does reports nausea, HA, fatigue, and chills on Friday. Denies those symptoms at this time. Resp even and unlabored. A/ox4. No acute distress noted. Denies any need at this time. Call light within reach. Bed in lowest position. Will continue to monitor.

## 2020-10-07 NOTE — ED PROVIDER NOTES
[facility]  eMERGENCY dEPARTMENT eNCOUnter        Pt Name: Angelina Hedrick  MRN: [id number]  [place]ry 1955  Date of evaluation: 10/7/2020  Provider: Murphy Hutson MD  PCP: Elmo Romero DO      CHIEF COMPLAINT       Chief Complaint   Patient presents with    Rash     since Thursday. Had nausea, headache, fatigue, chills on Friday. Sx since resolved. Red raised rash across low back. HISTORY OFPRESENT ILLNESS   (Location/Symptom, Timing/Onset, Context/Setting, Quality, Duration, Modifying Factors,Severity)  Note limiting factors. Angelina Hedrick is a 60 y.o. male       Location/Symptom: Rash across lower back  Timing/Onset: Symptoms started this past Saturday. Context/Setting: Patient was up at a camp found that he was closing down. He was there last week. He came home Friday last week. He had developed a red bump on his right buttock that was very sensitive to touch. Friday into Saturday he has had fatigue some nausea and some chills. The chills and nausea have since gone away. He then developed increasing discomfort across his lower back and the rash has now become bilateral.  It continues to be painful. Quality: It just hurts. He could not really give a quality  Duration: As above this has been going on about 6 days now  Modifying Factors: It does hurt to touch the area. He also has several other comorbidities including Crohn's disease for which she receives Remicade. Last infusion was September 4. Is also diabetic has a history of abdominal aortic aneurysm and cardiac disease. He has some pulmonary history because he smoked for 45 years and quit 3 years ago. However this really no change in his respiratory symptoms and there is no coughing. His wife was concerned about Covid but as his disease has progressed is becoming more apparent that it seems to be unrelated.   Severity: Between 7 and 8 out of 10    Nursing Noteswere all reviewed and agreed with or any disagreements were addressed  in the HPI. REVIEW OF SYSTEMS    (2-9 systems for level 4, 10 or more for level 5)     Review of Systems   Constitutional: Positive for chills and fatigue. Negative for fever. HENT: Negative for ear pain, rhinorrhea and sore throat. Eyes: Negative for pain, discharge, redness and visual disturbance. Respiratory: Negative for cough, shortness of breath and wheezing. Cardiovascular: Positive for chest pain. Negative for palpitations and leg swelling. Few days ago he had some chest pressure. Gastrointestinal: Positive for nausea. Negative for abdominal pain, diarrhea and vomiting. Genitourinary: Negative for difficulty urinating and dysuria. Musculoskeletal: Negative for arthralgias, back pain and myalgias. Skin: Positive for rash. Allergic/Immunologic: Negative for environmental allergies. Neurological: Negative for dizziness, seizures, syncope and headaches. Hematological: Negative for adenopathy. Psychiatric/Behavioral: Negative for suicidal ideas. The patient is not nervous/anxious.           PAST MEDICAL HISTORY     Past Medical History:   Diagnosis Date    Aneurysm of ascending aorta ([address]     CAD (coronary artery disease) 08/2017    NSTEMI    Chronic diastolic CHF (congestive heart failure), NYHA class 2 ([address]     Crohn's disease ([address]     Diabetes mellitus ([address]     Fistula of intestine     History of resection of small bowel     Pancreatitis     Peritonitis ([address]          SURGICAL HISTORY     Past Surgical History:   Procedure Laterality Date    ABSCESS DRAINAGE  2/11/15    I and d of scrotal abscess    ABSCESS DRAINAGE Right 08/04/2017    right inner thigh    CARDIAC CATHETERIZATION  08/2017    SHAN x 2 to RCA; OM and Dg stenosis    CHOLECYSTECTOMY      SMALL INTESTINE SURGERY           CURRENTMEDICATIONS       Previous Medications    ASPIRIN 81 MG CHEWABLE TABLET    Take 1 tablet by mouth daily ATORVASTATIN (LIPITOR) 40 MG TABLET    Take 1 tablet by mouth nightly    FREESTYLE LANCETS MISC    1 each by Does not apply route daily    FUROSEMIDE (LASIX) 20 MG TABLET    TAKE 1 TABLET DAILY    GLIPIZIDE (GLUCOTROL XL) 5 MG EXTENDED RELEASE TABLET    Take 1 tablet by mouth daily    GLUCOSE (GLUTOSE) 40 % GEL    Take 37.5 mLs by mouth as needed (low blood sugar (less than 70))    GLUCOSE BLOOD VI TEST STRIPS (FREESTYLE TEST STRIPS) STRIP    1 each by In Vitro route daily As needed. GLUCOSE MONITORING KIT (FREESTYLE) MONITORING KIT    1 kit by Does not apply route daily as needed (BS checks)    INFLIXIMAB (REMICADE) 100 MG INJECTION    Infuse 5 mg/kg intravenously See Admin Instructions q 6-8 wks    INSULIN GLARGINE (LANTUS SOLOSTAR) 100 UNIT/ML INJECTION PEN    40 units daily    INSULIN PEN NEEDLE (B-D UF III MINI PEN NEEDLES) 31G X 5 MM MISC    4 times a day    INSULIN PEN NEEDLE (PEN NEEDLES) 32G X 4 MM MISC    1 Container by Does not apply route every morning (before breakfast)    MESALAMINE (PENTASA) 500 MG EXTENDED RELEASE CAPSULE    Take 2 capsules by mouth 2 times daily    METFORMIN (GLUCOPHAGE-XR) 500 MG EXTENDED RELEASE TABLET    Take 1 tablet by mouth 2 times daily (with meals)    METOPROLOL SUCCINATE (TOPROL XL) 25 MG EXTENDED RELEASE TABLET    Take 0.5 tablets by mouth nightly    NITROGLYCERIN (NITROSTAT) 0.4 MG SL TABLET    Place 1 tablet under the tongue every 5 minutes as needed for Chest pain       ALLERGIES     Codeine;  Oxycodone-acetaminophen; Percocet [oxycodone-acetaminophen]; and Oxycodone    FAMILY HISTORY       Family History   Problem Relation Age of Onset    Diabetes Mother     Cancer Mother           SOCIAL HISTORY       Social History     Socioeconomic History    Marital status:      Spouse name: None    Number of children: None    Years of education: None    Highest education level: None   Occupational History    None   Social Needs    Financial resource strain: Not very hard    Food insecurity     Worry: Never true     Inability: Never true    Transportation needs     Medical: No     Non-medical: No   Tobacco Use    Smoking status: Former Smoker     Packs/day: 1.50     Years: 10.00     Pack years: 15.00     Types: Cigarettes     Last attempt to quit: 8/12/2017     Years since quitting: 3.1    Smokeless tobacco: Former User     Quit date: 8/12/2017   Substance and Sexual Activity    Alcohol use: Not Currently     Comment: rarely    Drug use: No    Sexual activity: Not Currently   Lifestyle    Physical activity     Days per week: None     Minutes per session: None    Stress: None   Relationships    Social connections     Talks on phone: None     Gets together: None     Attends Synagogue service: None     Active member of club or organization: None     Attends meetings of clubs or organizations: None     Relationship status: None    Intimate partner violence     Fear of current or ex partner: None     Emotionally abused: None     Physically abused: None     Forced sexual activity: None   Other Topics Concern    None   Social History Narrative    None       SCREENINGS             PHYSICAL EXAM    (up to 7 for level 4, 8 or more for level 5)     ED Triage Vitals [10/07/20 1056]   BP Temp Temp Source Pulse Resp SpO2 Height Weight   134/78 98.1 °F (36.7 °C) Oral 86 16 93 % 6' 2\" (1.88 m) 260 lb 9.3 oz (118.2 kg)      height is 6' 2\" (1.88 m) and weight is 260 lb 9.3 oz (118.2 kg). His oral temperature is 98.1 °F (36.7 °C). His blood pressure is 160/84 (abnormal) and his pulse is 79. His respiration is 17 and oxygen saturation is 94%. Physical Exam  Constitutional:       Appearance: He is well-developed. He is not diaphoretic. HENT:      Head: Normocephalic and atraumatic. Right Ear: External ear normal.      Left Ear: External ear normal.   Eyes:      General: No scleral icterus. Right eye: No discharge. Left eye: No discharge.    Neck: Musculoskeletal: Normal range of motion. Thyroid: No thyromegaly. Vascular: No JVD. Trachea: No tracheal deviation. Cardiovascular:      Rate and Rhythm: Normal rate and regular rhythm. Heart sounds: No murmur. No friction rub. No gallop. Pulmonary:      Effort: Pulmonary effort is normal. No respiratory distress. Breath sounds: Normal breath sounds. No stridor. No wheezing or rales. Abdominal:      General: There is no distension. Palpations: Abdomen is soft. Tenderness: There is no abdominal tenderness. There is no guarding or rebound. Musculoskeletal:         General: No tenderness. Skin:     General: Skin is warm and dry. Findings: No rash (On exposed body surfaces). Comments: Across the sacrum he has an erythematous rash. It does not look like shingles. There is no vesicles. There is erythema and some swelling on the left side. There is definite warmth noted and a little bit of swelling. I do not feel an actual abscess. Neurological:      Mental Status: He is alert and oriented to person, place, and time. Coordination: Coordination normal.   Psychiatric:         Behavior: Behavior normal.         Thought Content:  Thought content normal.             DIAGNOSTIC RESULTS   LABS:    Results for orders placed or performed during the hospital encounter of 10/07/20   CBC Auto Differential   Result Value Ref Range    WBC 7.2 4.0 - 11.0 K/uL    RBC 4.97 4.20 - 5.90 M/uL    Hemoglobin 14.1 13.5 - 17.5 g/dL    Hematocrit 42.0 40.5 - 52.5 %    MCV 84.7 80.0 - 100.0 fL    MCH 28.4 26.0 - 34.0 pg    MCHC 33.6 31.0 - 36.0 g/dL    RDW 14.1 12.4 - 15.4 %    Platelets 176 309 - 782 K/uL    MPV 8.5 5.0 - 10.5 fL    Neutrophils % 64.8 %    Lymphocytes % 23.1 %    Monocytes % 10.1 %    Eosinophils % 1.2 %    Basophils % 0.8 %    Neutrophils Absolute 4.7 1.7 - 7.7 K/uL    Lymphocytes Absolute 1.7 1.0 - 5.1 K/uL    Monocytes Absolute 0.7 0.0 - 1.3 K/uL    Eosinophils Absolute 0.1 0.0 - 0.6 K/uL    Basophils Absolute 0.1 0.0 - 0.2 K/uL   Comprehensive Metabolic Panel w/ Reflex to MG   Result Value Ref Range    Sodium 134 (L) 136 - 145 mmol/L    Potassium reflex Magnesium 4.3 3.5 - 5.1 mmol/L    Chloride 98 (L) 99 - 110 mmol/L    CO2 24 21 - 32 mmol/L    Anion Gap 12 3 - 16    Glucose 421 (H) 70 - 99 mg/dL    BUN 18 7 - 20 mg/dL    CREATININE 1.0 0.8 - 1.3 mg/dL    GFR Non-African American >60 >60    GFR African American >60 >60    Calcium 9.7 8.3 - 10.6 mg/dL    Total Protein 7.6 6.4 - 8.2 g/dL    Alb 3.6 3.4 - 5.0 g/dL    Albumin/Globulin Ratio 0.9 (L) 1.1 - 2.2    Total Bilirubin 0.7 0.0 - 1.0 mg/dL    Alkaline Phosphatase 126 40 - 129 U/L    ALT 24 10 - 40 U/L    AST 21 15 - 37 U/L    Globulin 4.0 g/dL   Troponin   Result Value Ref Range    Troponin <0.01 <0.01 ng/mL   C-Reactive Protein   Result Value Ref Range    CRP 26.2 (H) 0.0 - 5.1 mg/L   Urinalysis Reflex to Culture    Specimen: Urine, clean catch   Result Value Ref Range    Color, UA YELLOW Straw/Yellow    Clarity, UA Clear Clear    Glucose, Ur >=1000 (A) Negative mg/dL    Bilirubin Urine Negative Negative    Ketones, Urine Negative Negative mg/dL    Specific Gravity, UA >1.030 1.005 - 1.030    Blood, Urine Negative Negative    pH, UA 5.5 5.0 - 8.0    Protein, UA Negative Negative mg/dL    Urobilinogen, Urine 0.2 <2.0 E.U./dL    Nitrite, Urine Negative Negative    Leukocyte Esterase, Urine Negative Negative    Microscopic Examination Not Indicated     Urine Type Urinebag     Urine Reflex to Culture Not Indicated    Lactic Acid, Plasma   Result Value Ref Range    Lactic Acid 2.2 (H) 0.4 - 2.0 mmol/L   EKG 12 Lead   Result Value Ref Range    Ventricular Rate 77 BPM    Atrial Rate 77 BPM    P-R Interval 172 ms    QRS Duration 90 ms    Q-T Interval 356 ms    QTc Calculation (Bazett) 402 ms    P Axis 19 degrees    R Axis 14 degrees    T Axis 16 degrees    Diagnosis       Normal sinus rhythmBaseline artifactCannot rule out Anterior infarct , age undeterminedAbnormal ECGWhen compared with ECG of 05-DEC-2019 14:55,T wave inversion less evident in Inferior leadsNonspecific T wave abnormality no longer evident in Lateral leadsConfirmed by [facility] Liliane GRIFFITHS MD ([id number]) on 10/7/2020 5:14:11 PM       All other labs were within normal range or not returned as of this dictation. EKG: All EKG's are interpreted by the Emergency Department Physician who either signs orCo-signs this chart in the absence of a cardiologist.    EKG visualized pulmonary interpreted by myself shows sinus rhythm at a rate of 77. Normal axis of 14. Nonspecific T wave findings inferiorly but overall no significant change compared to one from last year December 5. No obvious signs of acute injury or ischemia. RADIOLOGY:   Non-plain film images such as CT, Ultrasound and MRI are read by the radiologist. [facility] radiographic images are visualized and preliminarily interpreted by the  EDProvider with the below findings:    Ct Abdomen Pelvis W Iv Contrast Additional Contrast? None    Result Date: 10/7/2020  EXAMINATION: CT OF THE ABDOMEN AND PELVIS WITH CONTRAST 10/7/2020 12:48 pm TECHNIQUE: CT of the abdomen and pelvis was performed with the administration of intravenous contrast. Multiplanar reformatted images are provided for review. Dose modulation, iterative reconstruction, and/or weight based adjustment of the mA/kV was utilized to reduce the radiation dose to as low as reasonably achievable. COMPARISON: None. HISTORY: ORDERING SYSTEM PROVIDED HISTORY: rash posteriorly on low back, nausea, chills hx of Crohn's AAA. Suspect this is just a cellulitis but rule out abscess TECHNOLOGIST PROVIDED HISTORY: Reason for exam:->rash posteriorly on low back, nausea, chills hx of Crohn's AAA. Suspect this is just a cellulitis but rule out abscess Additional Contrast?->None Reason for Exam: rash posteriorly on low back, nausea, chills hx of Crohn's AAA.  Suspect this is just a clindamycin (CLEOCIN) 900 mg in dextrose 5 % 50 mL IVPB (0 mg Intravenous Stopped 10/7/20 1948)       Stable. He still having a lot of pain. He is very concerned that he will leave and this will progress rapidly. I gave him a liter of fluids and 900 mg of IV clindamycin. He has a slightly elevated lactate but he is on metformin. I did put a consult into the hospitalist to discuss admission. FINAL IMPRESSION      1. Cellulitis of buttock          DISPOSITION/PLAN    DISPOSITION Admitted 10/07/2020 03:27:00 PM      PATIENT REFERRED TO:  No follow-up provider specified.     DISCHARGE MEDICATIONS:  New Prescriptions    No medications on file       DISCONTINUED MEDICATIONS:  Discontinued Medications    No medications on file              (Please note that portions of this note were completed with a voice recognition program.  Efforts were made to editthe dictations but occasionally words are mis-transcribed.)    Su Fine MD (electronically signed)           Su Fine MD  10/07/20 [id number]

## 2020-10-07 NOTE — ED NOTES
Handoff report to Jessica Hurt RN to assume care. Denies further questions.         Karol Aly RN  10/07/20 [id number]

## 2020-10-07 NOTE — ED NOTES
Bed: C-30  Expected date:   Expected time:   Means of arrival:   Comments:  81 Shiloh Cummins RN  10/07/20 [id number]

## 2020-10-07 NOTE — ED NOTES
Pharmacy Medication Reconciliation Note     List of medications patient is currently taking is complete. Source of information:   1.  EMR  2. patient and wife    Notes regarding home medications:   1. did not take any medications today    Denies taking any other OTC or herbal medications    Monica Strauss PharmD, BCPS  10/7/2020  3:24 PM

## 2020-10-07 NOTE — CONSULTS
Clinical Pharmacy Note  Vancomycin Consult    Reymundo Sousa is a 60 y.o. male ordered Vancomycin for cellulitis; consult received from Dr. Judy Muir to manage therapy. Also receiving Cefepime. Patient Active Problem List   Diagnosis    [facility]    Perirectal abscess    Abscess of right thigh    NSTEMI (non-ST elevated myocardial infarction) ([facility]    Mixed hyperlipidemia    Type 2 diabetes mellitus without complication, without long-term current use of insulin ([facility]    Obesity (BMI 30-39. 9)    Tobacco abuse    Chest pain    Ischemic cardiomyopathy    Ascending aortic aneurysm ([facility])    Shortness of breath    Cellulitis       Allergies:  Codeine; Oxycodone-acetaminophen; Percocet [oxycodone-acetaminophen]; and Oxycodone     Temp max:  Temp (24hrs), Av.4 °F (36.9 °C), Min:98.1 °F (36.7 °C), Max:98.7 °F (37.1 °C)      Recent Labs     10/07/20  1208   WBC 7.2       Recent Labs     10/07/20  1208   BUN 18   CREATININE 1.0         Intake/Output Summary (Last 24 hours) at 10/7/2020 1858  Last data filed at 10/7/2020 1554  Gross per 24 hour   Intake 1050 ml   Output --   Net 1050 ml       Culture Results:      Ht Readings from Last 1 Encounters:   10/07/20 6' 2\" (1.88 m)        Wt Readings from Last 1 Encounters:   10/07/20 260 lb 9.3 oz (118.2 kg)         Estimated Creatinine Clearance: 102 mL/min (based on SCr of 1 mg/dL). Assessment/Plan:  Vancomycin 1250 mg IV every 12 hours ordered. Regimen projects a trough level of 10-15 mg/L. Level ordered for 10- 0800. Thank you for the consult.    Carmelo Manzo [place] 10/7/2020 6:59 PM

## 2020-10-08 LAB
ANION GAP SERPL CALCULATED.3IONS-SCNC: 9 MMOL/L (ref 3–16)
BUN BLDV-MCNC: 10 MG/DL (ref 7–20)
C-REACTIVE PROTEIN: 41.5 MG/L (ref 0–5.1)
CALCIUM SERPL-MCNC: 8.6 MG/DL (ref 8.3–10.6)
CHLORIDE BLD-SCNC: 104 MMOL/L (ref 99–110)
CO2: 24 MMOL/L (ref 21–32)
CREAT SERPL-MCNC: 0.7 MG/DL (ref 0.8–1.3)
ESTIMATED AVERAGE GLUCOSE: 251.8 MG/DL
GFR AFRICAN AMERICAN: >60
GFR NON-AFRICAN AMERICAN: >60
GLUCOSE BLD-MCNC: 191 MG/DL (ref 70–99)
GLUCOSE BLD-MCNC: 204 MG/DL (ref 70–99)
GLUCOSE BLD-MCNC: 260 MG/DL (ref 70–99)
GLUCOSE BLD-MCNC: 261 MG/DL (ref 70–99)
GLUCOSE BLD-MCNC: 364 MG/DL (ref 70–99)
HBA1C MFR BLD: 10.4 %
HCT VFR BLD CALC: 36.7 % (ref 40.5–52.5)
HEMOGLOBIN: 12.6 G/DL (ref 13.5–17.5)
LACTIC ACID: 1.1 MMOL/L (ref 0.4–2)
MAGNESIUM: 1.9 MG/DL (ref 1.8–2.4)
MCH RBC QN AUTO: 28.8 PG (ref 26–34)
MCHC RBC AUTO-ENTMCNC: 34.4 G/DL (ref 31–36)
MCV RBC AUTO: 83.7 FL (ref 80–100)
PDW BLD-RTO: 14.1 % (ref 12.4–15.4)
PERFORMED ON: ABNORMAL
PLATELET # BLD: 130 K/UL (ref 135–450)
PMV BLD AUTO: 8.6 FL (ref 5–10.5)
POTASSIUM REFLEX MAGNESIUM: 3.5 MMOL/L (ref 3.5–5.1)
PROCALCITONIN: 0.09 NG/ML (ref 0–0.15)
RBC # BLD: 4.39 M/UL (ref 4.2–5.9)
SEDIMENTATION RATE, ERYTHROCYTE: 50 MM/HR (ref 0–20)
SODIUM BLD-SCNC: 137 MMOL/L (ref 136–145)
WBC # BLD: 7.5 K/UL (ref 4–11)

## 2020-10-08 PROCEDURE — 36415 COLL VENOUS BLD VENIPUNCTURE: CPT

## 2020-10-08 PROCEDURE — 80048 BASIC METABOLIC PNL TOTAL CA: CPT

## 2020-10-08 PROCEDURE — 6370000000 HC RX 637 (ALT 250 FOR IP): Performed by: INTERNAL MEDICINE

## 2020-10-08 PROCEDURE — 97530 THERAPEUTIC ACTIVITIES: CPT

## 2020-10-08 PROCEDURE — 83605 ASSAY OF LACTIC ACID: CPT

## 2020-10-08 PROCEDURE — 83735 ASSAY OF MAGNESIUM: CPT

## 2020-10-08 PROCEDURE — 87641 MR-STAPH DNA AMP PROBE: CPT

## 2020-10-08 PROCEDURE — 6360000002 HC RX W HCPCS: Performed by: NURSE PRACTITIONER

## 2020-10-08 PROCEDURE — 1200000000 HC SEMI PRIVATE

## 2020-10-08 PROCEDURE — 97161 PT EVAL LOW COMPLEX 20 MIN: CPT

## 2020-10-08 PROCEDURE — 85652 RBC SED RATE AUTOMATED: CPT

## 2020-10-08 PROCEDURE — 94760 N-INVAS EAR/PLS OXIMETRY 1: CPT

## 2020-10-08 PROCEDURE — 85027 COMPLETE CBC AUTOMATED: CPT

## 2020-10-08 PROCEDURE — 84145 PROCALCITONIN (PCT): CPT

## 2020-10-08 PROCEDURE — 86063 ANTISTREPTOLYSIN O SCREEN: CPT

## 2020-10-08 PROCEDURE — 86140 C-REACTIVE PROTEIN: CPT

## 2020-10-08 PROCEDURE — 97165 OT EVAL LOW COMPLEX 30 MIN: CPT

## 2020-10-08 PROCEDURE — 2580000003 HC RX 258: Performed by: INTERNAL MEDICINE

## 2020-10-08 PROCEDURE — 86060 ANTISTREPTOLYSIN O TITER: CPT

## 2020-10-08 PROCEDURE — 6360000002 HC RX W HCPCS: Performed by: INTERNAL MEDICINE

## 2020-10-08 RX ORDER — LEVOFLOXACIN 5 MG/ML
500 INJECTION, SOLUTION INTRAVENOUS EVERY 24 HOURS
Status: DISCONTINUED | OUTPATIENT
Start: 2020-10-08 | End: 2020-10-09 | Stop reason: HOSPADM

## 2020-10-08 RX ADMIN — ASPIRIN 81 MG: 81 TABLET, CHEWABLE ORAL at 08:44

## 2020-10-08 RX ADMIN — INSULIN LISPRO 4 UNITS: 100 INJECTION, SOLUTION INTRAVENOUS; SUBCUTANEOUS at 08:45

## 2020-10-08 RX ADMIN — INSULIN LISPRO 3 UNITS: 100 INJECTION, SOLUTION INTRAVENOUS; SUBCUTANEOUS at 21:47

## 2020-10-08 RX ADMIN — ENOXAPARIN SODIUM 40 MG: 40 INJECTION SUBCUTANEOUS at 08:44

## 2020-10-08 RX ADMIN — INSULIN GLARGINE 10 UNITS: 100 INJECTION, SOLUTION SUBCUTANEOUS at 21:47

## 2020-10-08 RX ADMIN — INSULIN LISPRO 10 UNITS: 100 INJECTION, SOLUTION INTRAVENOUS; SUBCUTANEOUS at 13:13

## 2020-10-08 RX ADMIN — MESALAMINE 1000 MG: 500 CAPSULE ORAL at 21:48

## 2020-10-08 RX ADMIN — SODIUM CHLORIDE: 9 INJECTION, SOLUTION INTRAVENOUS at 14:38

## 2020-10-08 RX ADMIN — VANCOMYCIN HYDROCHLORIDE 1250 MG: 10 INJECTION, POWDER, LYOPHILIZED, FOR SOLUTION INTRAVENOUS at 21:48

## 2020-10-08 RX ADMIN — MESALAMINE 1000 MG: 500 CAPSULE ORAL at 08:44

## 2020-10-08 RX ADMIN — INSULIN LISPRO 6 UNITS: 100 INJECTION, SOLUTION INTRAVENOUS; SUBCUTANEOUS at 17:51

## 2020-10-08 RX ADMIN — VANCOMYCIN HYDROCHLORIDE 1250 MG: 10 INJECTION, POWDER, LYOPHILIZED, FOR SOLUTION INTRAVENOUS at 09:50

## 2020-10-08 RX ADMIN — LEVOFLOXACIN 500 MG: 5 INJECTION, SOLUTION INTRAVENOUS at 14:38

## 2020-10-08 RX ADMIN — ATORVASTATIN CALCIUM 40 MG: 40 TABLET, FILM COATED ORAL at 21:48

## 2020-10-08 RX ADMIN — METOPROLOL SUCCINATE 12.5 MG: 25 TABLET, EXTENDED RELEASE ORAL at 21:48

## 2020-10-08 ASSESSMENT — PAIN SCALES - GENERAL
PAINLEVEL_OUTOF10: 0
PAINLEVEL_OUTOF10: 0

## 2020-10-08 NOTE — PROGRESS NOTES
Checking on patient Q2H for nutrition needs, hygiene needs, comfort measures, mobility, fall risk interventions, and safe environment. All precautions and interventions in place. Educated patient on use of call light and telephone. Patient verbalizes understanding. Call light/telephone in reach.   Electronically signed by Vianey Schultz RN on 10/7/2020 at 9:58 PM

## 2020-10-08 NOTE — PLAN OF CARE
Problem: Pain:  Goal: Pain level will decrease  Description: Pain level will decrease  Outcome: Ongoing  Note: Pain /discomfort being managed with PRN analgesics per MD orders. Patient able to express presence and absence of pain and rate pain appropriately using numerical scale. Goal: Control of acute pain  Description: Control of acute pain  Outcome: Ongoing  Goal: Control of chronic pain  Description: Control of chronic pain  Outcome: Ongoing  Goal: Patient's pain/discomfort is manageable  Description: Patient's pain/discomfort is manageable  Outcome: Ongoing     Problem: Infection:  Goal: Will remain free from infection  Description: Will remain free from infection  Outcome: Ongoing  Note: Patient is alert and oriented, afebrile, has manageable complaints of pain, skin is intact and appropriate for ethnicity in color       Problem: Safety:  Goal: Free from accidental physical injury  Description: Free from accidental physical injury  Outcome: Ongoing  Goal: Free from intentional harm  Description: Free from intentional harm  Outcome: Ongoing     Problem: Daily Care:  Goal: Daily care needs are met  Description: Daily care needs are met  Outcome: Ongoing     Problem: Skin Integrity:  Goal: Skin integrity will stabilize  Description: Skin integrity will stabilize  Outcome: Ongoing  Note: Vicente score assessed. Patient able to ambulate and turn self and repositioned patient Q2H and assessed skin. Educated patient on importance of repositioning to prevent skin issues.         Problem: Discharge Planning:  Goal: Patients continuum of care needs are met  Description: Patients continuum of care needs are met  Outcome: Ongoing

## 2020-10-08 NOTE — PROGRESS NOTES
Physician Progress Note      Dale Rossi  CSN #:                  [id number]  :                       1955  ADMIT DATE:       10/7/2020 10:54 AM  DISCH DATE:  RESPONDING  PROVIDER #:        DAX Deutsch CNP          QUERY TEXT:    Dear Travis Rothman    Pt admitted with  buttocks cellulitis. Pt noted to have DM 2 . If possible,   please document in progress notes and discharge summary the relationship, if   any, between cellulitis and DM. The medical record reflects the following:  Risk Factors: HX- DM2  Clinical Indicators: HgA1c 10.4. BS's on admission- 421/302/331. Blanchie Bevels Blanchie Bevels Per ED   note-Across the sacrum he has an erythematous rash. It does not look like   shingles. There is no vesicles. There is erythema and some swelling on the   left side. There is definite warmth noted and a little bit of swelling. I do   not feel an actual abscess. Treatment: Cleocin IV, Vanco IV, Blood cultures, monitor lactic acid, monitor   blood sugars, insulin sliding scale, diabetic diet    Estelita Dean RN BSN CDS [email or web address]  Options provided:  -- Buttocks cellulitis due to Diabetes  -- Buttocks cellulitis unrelated to Diabetes  -- Other - I will add my own diagnosis  -- Disagree - Not applicable / Not valid  -- Disagree - Clinically unable to determine / Unknown  -- Refer to Clinical Documentation Reviewer    PROVIDER RESPONSE TEXT:    Buttocks cellulitis unrelated to Diabetes.     Query created by: Aric Spears on 10/8/2020 9:57 AM      Electronically signed by:  Rosendo Deutsch CNP 10/8/2020 11:32 AM

## 2020-10-08 NOTE — PROGRESS NOTES
Physical Therapy    Facility/Department: [address] ORTHOPEDICS  Initial Assessment/Discharge note    NAME: Shirin Shields  : 1955  MRN: [id number]    Date of Service: 10/8/2020    Assessment / Discharge Recommendations:  -patient proves safe with all mobility - needing no assistance or assistive device  -anticipate discharge to home   -no acute PT needs at this time    Body structures, Functions, Activity limitations: Increased pain  Decision Making: Low Complexity  Activity Tolerance  Activity Tolerance: Patient Tolerated treatment well       Patient Diagnosis(es): The encounter diagnosis was Cellulitis of buttock. has a past medical history of Aneurysm of ascending aorta (HCC), CAD (coronary artery disease), Chronic diastolic CHF (congestive heart failure), NYHA class 2 ([address] Crohn's disease ([address] Diabetes mellitus ([address] Fistula of intestine, History of resection of small bowel, Pancreatitis, and Peritonitis ([address] has a past surgical history that includes Cholecystectomy; Small intestine surgery; Abscess Drainage (2/11/15); Abscess Drainage (Right, 2017); and Cardiac catheterization (2017).   Vision/Hearing  Vision: Impaired  Vision Exceptions: Wears glasses for reading  Hearing: Within functional limits     Subjective  General  Chart Reviewed: Yes  Patient assessed for rehabilitation services?: Yes  Additional Pertinent Hx: here due to painful rash on low back/buttocks  Response To Previous Treatment: Not applicable  Family / Caregiver Present: No  Follows Commands: Within Functional Limits  Subjective  Subjective: arrived to room along with OT to patient resting in bed - alert oriented and agreeable to PT OT assessments and up to demonstrate his mobility    Orientation  Orientation  Overall Orientation Status: Within Functional Limits  Social/Functional History  Social/Functional History  Lives With: Spouse  Type of Home: House  Home Layout: Two level, Performs ADL's on one level(bed/bathrom and laundry on main floor)  Home Access: Stairs to enter without rails  Entrance Stairs - Number of Steps: 3 LAMAR no rail  Bathroom Shower/Tub: Tub/Shower unit  Bathroom Toilet: (comfort height)  Bathroom Accessibility: Accessible  Home Equipment: (no DME)  ADL Assistance: Independent  Homemaking Assistance: (Shares IADLs with spouse)  Ambulation Assistance: Independent  Transfer Assistance: Independent  Active : Yes  Occupation: Retired  Cognition   Cognition  Overall Cognitive Status: WFL  Objective  Motor Control  Gross Motor?: WFL  Sensation  Overall Sensation Status: WFL  Bed mobility  Supine to Sit: Independent  Sit to Supine: Independent  Transfers  Sit to Stand: Independent  Stand to sit: Independent  Ambulation  Ambulation?: Yes  Ambulation 1  Surface: level tile  Device: No Device  Assistance: Independent  Quality of Gait: good base of support with a step through pattern  Distance: in room for ~40 feet  Comments: very stable  Stairs/Curb  Stairs?: No  Balance  Comments: steady with transfers and ambulation    Plan   Safety Devices  Type of devices:  All fall risk precautions in place, Call light within reach, Left in bed, Nurse notified    AM-PAC Score  AM-PAC Inpatient Mobility Raw Score : 24 (10/08/20 1148)  AM-PAC Inpatient T-Scale Score : 61.14 (10/08/20 1148)  Mobility Inpatient CMS 0-100% Score: 0 (10/08/20 1148)  Mobility Inpatient CMS G-Code Modifier : [facility] (10/08/20 1148)          Goals  Patient Goals   Patient goals : get home after getting determination of what the rash is and treatment       Therapy Time   Individual Concurrent Group Co-treatment   Time In       0945   Time Out       1010   Minutes       54 Christian Alvarado, PT

## 2020-10-08 NOTE — PLAN OF CARE
Problem: Pain:  Goal: Pain level will decrease  Description: Pain level will decrease  10/8/2020 0720 by Maia Orosco RN  Outcome: Ongoing  Note: Pain level will decrease. Electronically signed by Maia Orosco RN on 10/8/2020 at 7:20 AM    10/7/2020 2158 by Xiomara Mayer RN  Outcome: Ongoing  Note: Pain /discomfort being managed with PRN analgesics per MD orders. Patient able to express presence and absence of pain and rate pain appropriately using numerical scale. Goal: Control of acute pain  Description: Control of acute pain  10/8/2020 0720 by Maia Orosco RN  Outcome: Ongoing  Note: Control of acute pain. Electronically signed by Maia Orosco RN on 10/8/2020 at 7:20 AM    10/7/2020 2158 by Xiomara Mayer RN  Outcome: Ongoing  Goal: Control of chronic pain  Description: Control of chronic pain  10/8/2020 0720 by Maia Orosco RN  Outcome: Ongoing  Note: Control of chronic pain. Electronically signed by Maia Orosco RN on 10/8/2020 at 7:20 AM    10/7/2020 2158 by Xiomara Mayer RN  Outcome: Ongoing  Goal: Patient's pain/discomfort is manageable  Description: Patient's pain/discomfort is manageable  10/8/2020 0720 by Maia Orosco RN  Outcome: Ongoing  Note: Patients pain/discomfort is manageable. Electronically signed by Maia Orosco RN on 10/8/2020 at 7:21 AM    10/7/2020 2158 by Xiomara Mayer RN  Outcome: Ongoing     Problem: Infection:  Goal: Will remain free from infection  Description: Will remain free from infection  10/8/2020 0720 by Maia Orosco RN  Outcome: Ongoing  Note: Will remain free from infection.   Electronically signed by Maia Orosco RN on 10/8/2020 at 7:21 AM    10/7/2020 2158 by Xiomara Mayer RN  Outcome: Ongoing  Note: Patient is alert and oriented, afebrile, has manageable complaints of pain, skin is intact and appropriate for ethnicity in color       Problem: Safety:  Goal: Free from accidental physical injury  Description: Free from accidental physical injury  10/8/2020 0720 by Oseas Calabrese RN  Outcome: Ongoing  Note: Free from accidental physical injury. Electronically signed by Oseas Calabrese RN on 10/8/2020 at 7:21 AM    10/7/2020 2158 by Daphne Morrissey RN  Outcome: Ongoing  Goal: Free from intentional harm  Description: Free from intentional harm  10/8/2020 0720 by Oseas Calabrese RN  Outcome: Ongoing  Note: Free from intentional harm. Electronically signed by Oseas Calabrese RN on 10/8/2020 at 7:21 AM    10/7/2020 2158 by Daphne Morrissey RN  Outcome: Ongoing     Problem: Daily Care:  Goal: Daily care needs are met  Description: Daily care needs are met  10/8/2020 0720 by Oseas Calabrese RN  Outcome: Ongoing  Note: Daily care needs are met. Electronically signed by Oseas Calabrese RN on 10/8/2020 at 7:21 AM    10/7/2020 2158 by Daphne Morrissey RN  Outcome: Ongoing     Problem: Skin Integrity:  Goal: Skin integrity will stabilize  Description: Skin integrity will stabilize  10/8/2020 0720 by Oseas Calabrese RN  Outcome: Ongoing  Note: Skin integrity will stabilize. Electronically signed by Oseas Calabrese RN on 10/8/2020 at 7:22 AM    10/7/2020 2158 by Daphne Morrissey RN  Outcome: Ongoing  Note: Vicente score assessed. Patient able to ambulate and turn self and repositioned patient Q2H and assessed skin. Educated patient on importance of repositioning to prevent skin issues. Problem: Discharge Planning:  Goal: Patients continuum of care needs are met  Description: Patients continuum of care needs are met  10/8/2020 0720 by Oseas Calabrese RN  Outcome: Ongoing  Note: Patients continuum of care needs are met.   Electronically signed by Oseas Calabrese RN on 10/8/2020 at 7:22 AM    10/7/2020 2158 by Daphne Morrissey RN  Outcome: Ongoing

## 2020-10-08 NOTE — PROGRESS NOTES
Pt arrived to floor from ER at 2125 via wheelchair. Pt oriented to room, call light, policies and procedures, the menu and ordering. Call light within reach. Bed in lowest position, bed alarm on, and wheels locked. Pt verbalized understanding. No complaints, questions or concerns at this time.

## 2020-10-08 NOTE — PROGRESS NOTES
Hospitalist Progress Note      PCP: Elmo Romero DO    Date of Admission: 10/7/2020    Chief Complaint: Skin rash    Hospital Course:   Patient is a 70-year-old male with past medical history of diabetes mellitus 2, CAD, tobacco use who presents to the hospital for rash and burning sensation on lower back. According to the patient for the past 4 days he has rash on his lower back which is increasing in size, more associated with burning sensation and painful to touch. Patient also mentions he had chills however no fevers. He endorses he was recently at a camp but does not remember any tick bites, spider bites. He denies nausea vomiting fevers diarrhea constipation dysuria. Subjective: seen and examined, no overnight events, pain better controlled.  No chills n/v/d      Medications:  Reviewed    Infusion Medications    dextrose      sodium chloride 75 mL/hr at 10/07/20 2248     Scheduled Medications    sodium chloride flush  10 mL Intravenous 2 times per day    enoxaparin  40 mg Subcutaneous Daily    aspirin  81 mg Oral Daily    atorvastatin  40 mg Oral Nightly    mesalamine  1,000 mg Oral BID    metoprolol succinate  12.5 mg Oral Nightly    vancomycin  1,250 mg Intravenous Q12H    vancomycin (VANCOCIN) intermittent dosing (placeholder)   Other RX Placeholder    insulin lispro  0-12 Units Subcutaneous TID WC    insulin lispro  0-6 Units Subcutaneous Nightly    insulin glargine  10 Units Subcutaneous Nightly    influenza virus vaccine  0.5 mL Intramuscular Once     PRN Meds: sodium chloride flush, potassium chloride, magnesium sulfate, magnesium hydroxide, promethazine **OR** ondansetron, nitroGLYCERIN, glucose, dextrose, glucagon (rDNA), dextrose      Intake/Output Summary (Last 24 hours) at 10/8/2020 0823  Last data filed at 10/7/2020 1554  Gross per 24 hour   Intake 1050 ml   Output --   Net 1050 ml       Exam:    /63   Pulse 69   Temp 97.7 °F (36.5 °C) (Oral)   Resp 16   Ht 6' 2\" (1.88 m)   Wt 261 lb 0.4 oz (118.4 kg)   SpO2 95%   BMI 33.51 kg/m²     General appearance: No apparent distress, appears stated age and cooperative. HEENT: Pupils equal, round, and reactive to light. Conjunctivae/corneas clear. Neck: Supple, with full range of motion. No jugular venous distention. Trachea midline. Respiratory:  Normal respiratory effort. Clear to auscultation, bilaterally without Rales/Wheezes/Rhonchi. Cardiovascular: Regular rate and rhythm with normal S1/S2 without murmurs, rubs or gallops. Abdomen: Soft, non-tender, non-distended with normal bowel sounds. Musculoskeletal: No clubbing, cyanosis or edema bilaterally. Full range of motion without deformity. Skin: Skin color, texture, turgor normal.  No rashes or lesions. Lower lumbar region with redness and tenderness r>L, the marking around the cellulitis shows redness retracting  Neurologic:  Neurovascularly intact without any focal sensory/motor deficits. Cranial nerves: II-XII intact, grossly non-focal.  Psychiatric: Alert and oriented, thought content appropriate, normal insight  Capillary Refill: Brisk,< 3 seconds   Peripheral Pulses: +2 palpable, equal bilaterally       Labs:   Recent Labs     10/07/20  1208 10/08/20  0518   WBC 7.2 7.5   HGB 14.1 12.6*   HCT 42.0 36.7*    130*     Recent Labs     10/07/20  1208 10/08/20  0518   * 137   K 4.3 3.5   CL 98* 104   CO2 24 24   BUN 18 10   CREATININE 1.0 0.7*   CALCIUM 9.7 8.6     Recent Labs     10/07/20  1208   AST 21   ALT 24   BILITOT 0.7   ALKPHOS 126     No results for input(s): INR in the last 72 hours.   Recent Labs     10/07/20  1208   TROPONINI <0.01       Urinalysis:      Lab Results   Component Value Date    NITRU Negative 10/07/2020    WBCUA 1 12/05/2019    RBCUA 2 12/05/2019    BLOODU Negative 10/07/2020    SPECGRAV >1.030 10/07/2020    GLUCOSEU >=1000 10/07/2020    GLUCOSEU 100 07/14/2010       Radiology:  CT ABDOMEN PELVIS W IV CONTRAST Additional Contrast? None   Final Result   Unremarkable CT abdomen and pelvis. No evidence for soft tissue abscess.    Minimal skin thickening is evident posteriorly                 Assessment/Plan:    Active Hospital Problems    Diagnosis Date Noted    Cellulitis [L03.90] 10/07/2020       Diabetes mellitus 2  Hypertension  Hyperlipidemia  Coronary artery disease      Cellulitis-Acute  CT negative for abscess  -IV Vanco  -Blood Cx obtained  -area has been highlighted with a marker to monitor progression  -CBC white count 7.5  -ASO,CRP, Procal and ESR ordered    Lactic Acididosis-Acute  -Initial lactic acid 2.2- repeated today  -gentle IV fluid therapy with normal saline    HTN-Chronic and Stable  Continue Metoprolol hold Lasix    DM-Chronic and Stable  Insulin sliding scale, diabetic diet     Resume home aspirin, statin    DVT Prophylaxis: Lovenox  Diet: DIET CARB CONTROL; Carb Control: 3 carb choices (45 gms)/meal  Code Status: Full Code    PT/OT Eval Status: ordered    Dispo - hopefully tomorrow    [facility], APRN - CNP

## 2020-10-08 NOTE — PROGRESS NOTES
Occupational Therapy   Occupational Therapy Initial Assessment  Date: 10/8/2020   Patient Name: Khurram Hughes  MRN: [id number]     : 1955    Date of Service: 10/8/2020    Discharge Recommendations:  Home independently  OT Equipment Recommendations  Equipment Needed: No    Assessment   Assessment: 61 y/o male admitted 10/7 with rash, erythema, warmth secondary to cellulitis lumbosacral area. PTA pt lives at home with spouse and idnependent with ADLs and mobility. Today, pt reports pain is much improved from initially coming into hospital. Pt completed mobility around room safetly. Pt reports feeling back to baseline and no further OT is warranted. Prognosis: Good  Decision Making: Low Complexity  OT Education: OT Role;Plan of Care  REQUIRES OT FOLLOW UP: No  Activity Tolerance  Activity Tolerance: Patient Tolerated treatment well  Safety Devices  Safety Devices in place: Yes  Type of devices: Left in bed;Call light within reach           Patient Diagnosis(es): The encounter diagnosis was Cellulitis of buttock. has a past medical history of Aneurysm of ascending aorta (HCC), CAD (coronary artery disease), Chronic diastolic CHF (congestive heart failure), NYHA class 2 ([address] Crohn's disease ([address] Diabetes mellitus ([address] Fistula of intestine, History of resection of small bowel, Pancreatitis, and Peritonitis ([address] has a past surgical history that includes Cholecystectomy; Small intestine surgery; Abscess Drainage (2/11/15); Abscess Drainage (Right, 2017); and Cardiac catheterization (2017). Restrictions       Subjective   General  Chart Reviewed: Yes  Patient assessed for rehabilitation services?: Yes  Additional Pertinent Hx: 61 y/o male admitted 10/7 with rash, erythema, warmth secondary to cellulitis lumbosacral area.   Family / Caregiver Present: No  Referring Practitioner: Hema Wells MD  Subjective  Subjective: Pt seen bedside and agreeable to therapy  Vital Signs  Level of Consciousness: Alert  Social/Functional History  Social/Functional History  Lives With: Spouse  Type of Home: House  Home Layout: Two level, Performs ADL's on one level(bed/bathrom and laundry on main floor)  Home Access: Stairs to enter without rails  Entrance Stairs - Number of Steps: 3 LAMAR no rail  Bathroom Shower/Tub: Tub/Shower unit  Bathroom Toilet: (comfort height)  Bathroom Accessibility: Accessible  Home Equipment: (no DME)  ADL Assistance: Independent  Homemaking Assistance: (Shares IADLs with spouse)  Ambulation Assistance: Independent  Transfer Assistance: Independent  Active : Yes  Occupation: Retired       Objective   Vision: Impaired  Vision Exceptions: Wears glasses for reading  Hearing: Within functional limits    Orientation  Overall Orientation Status: Within Functional Limits     Balance  Sitting Balance: Independent  Standing Balance: Independent  Functional Mobility  Functional Mobility Comments: around room pushing IV pole- mod I  ADL  LE Dressing: Supervision(able to michele socks in bed)  Additional Comments: declined further ADLs. anticipate pt will be supervision/independent        Bed mobility  Supine to Sit: Independent  Sit to Supine: Independent  Transfers  Sit to stand: Independent  Stand to sit:  Independent        Perception  Overall Perceptual Status: WFL     Sensation  Overall Sensation Status: WFL        LUE AROM (degrees)  LUE AROM : WFL  Left Hand AROM (degrees)  Left Hand AROM: WFL  RUE AROM (degrees)  RUE AROM : WFL  Right Hand AROM (degrees)  Right Hand AROM: WFL        Plan   Plan  Times per week: DC acute OT    AM-PAC Score  AM-PAC Inpatient Daily Activity Raw Score: 24 (10/08/20 1113)  AM-PAC Inpatient ADL T-Scale Score : 57.54 (10/08/20 1113)  ADL Inpatient CMS 0-100% Score: 0 (10/08/20 1113)  ADL Inpatient CMS G-Code Modifier : [address] (10/08/20 1113)    Goals  Short term goals  Time Frame for Short term goals: no acute OT goals identified  Patient Goals   Patient goals : to go home       Therapy Time   Individual Concurrent Group Co-treatment   Time In 0950         Time Out 1015         Minutes 25         Timed Code Treatment Minutes: 10 Minutes     This note to serve as OT d/c summary if pt is d/c-ed prior to next therapy session.     Zheng Parada, OTR/L

## 2020-10-08 NOTE — H&P
Hospital Medicine History & Physical      PCP: Leif Mazariegos DO    Date of Admission: 10/7/2020    Chief Complaint: Skin rash      History Of Present Illness:  Patient is a 61-year-old male with past medical history of diabetes mellitus 2, CAD, tobacco use who presents to the hospital for rash and burning sensation on lower back. According to the patient for the past 4 days he has rash on his lower back which is increasing in size, more associated with burning sensation and painful to touch. Patient also mentions he had chills however no fevers. He endorses he was recently at a camp but does not remember any tick bites, spider bites. He denies nausea vomiting fevers diarrhea constipation dysuria. Past Medical History:          Diagnosis Date    Aneurysm of ascending aorta (HCC)     CAD (coronary artery disease) 08/2017    NSTEMI    Chronic diastolic CHF (congestive heart failure), NYHA class 2 ([address]     Crohn's disease ([address]     Diabetes mellitus ([address]     Fistula of intestine     History of resection of small bowel     Pancreatitis     Peritonitis ([address]        Past Surgical History:          Procedure Laterality Date    ABSCESS DRAINAGE  2/11/15    I and d of scrotal abscess    ABSCESS DRAINAGE Right 08/04/2017    right inner thigh    CARDIAC CATHETERIZATION  08/2017    SHAN x 2 to RCA; OM and Dg stenosis    CHOLECYSTECTOMY      SMALL INTESTINE SURGERY         Medications Prior to Admission:      Prior to Admission medications    Medication Sig Start Date End Date Taking?  Authorizing Provider   metFORMIN (GLUCOPHAGE-XR) 500 MG extended release tablet Take 1 tablet by mouth 2 times daily (with meals) 8/20/20  Yes Leif Mazariegos,    insulin glargine (LANTUS SOLOSTAR) 100 UNIT/ML injection pen 40 units daily  Patient taking differently: Inject 40 Units into the skin nightly 40 units daily 7/16/20  Yes Edwin Bond, DO   nitroGLYCERIN (NITROSTAT) 0.4 MG SL tablet Place 1 tablet under the tongue every 5 minutes as needed for Chest pain 7/7/20  Yes Lauren Rogers DO   mesalamine (PENTASA) 500 MG extended release capsule Take 2 capsules by mouth 2 times daily 5/15/20  Yes Omer Mitchell MD   glipiZIDE (GLUCOTROL XL) 5 MG extended release tablet Take 1 tablet by mouth daily 4/27/20  Yes Omer Mitchell MD   metoprolol succinate (TOPROL XL) 25 MG extended release tablet Take 0.5 tablets by mouth nightly 2/10/20  Yes Leatha Everett MD   furosemide (LASIX) 20 MG tablet TAKE 1 TABLET DAILY 2/5/20  Yes Omer Mitchell MD   atorvastatin (LIPITOR) 40 MG tablet Take 1 tablet by mouth nightly 2/5/20  Yes Omer Mitchell MD   aspirin 81 MG chewable tablet Take 1 tablet by mouth daily 8/15/17  Yes Cathie Thorpe MD   Insulin Pen Needle (PEN NEEDLES) 32G X 4 MM MISC 1 Container by Does not apply route every morning (before breakfast) 7/16/20   Lauren Rogers DO   Insulin Pen Needle (B-D UF III MINI PEN NEEDLES) 31G X 5 MM MISC 4 times a day 2/5/20   Omer Mitchell MD   glucose (GLUTOSE) 40 % GEL Take 37.5 mLs by mouth as needed (low blood sugar (less than 70)) 8/13/19   Lizzie Roca MD   inFLIXimab (REMICADE) 100 MG injection Infuse 5 mg/kg intravenously See Admin Instructions q 6-8 wks    Historical Provider, MD   FREESTYLE LANCETS MISC 1 each by Does not apply route daily 8/18/17   Omer Mitchell MD   glucose monitoring kit (FREESTYLE) monitoring kit 1 kit by Does not apply route daily as needed (BS checks) 8/15/17   Cathie Thorpe MD   glucose blood VI test strips (FREESTYLE TEST STRIPS) strip 1 each by In Vitro route daily As needed. 8/15/17   Cathie Thorpe MD       Allergies:  Codeine; Oxycodone-acetaminophen; Percocet [oxycodone-acetaminophen]; and Oxycodone    Social History:      TOBACCO:   reports that he quit smoking about 3 years ago. His smoking use included cigarettes. He has a 15.00 pack-year smoking history. He quit smokeless tobacco use about 3 years ago. ETOH:   reports previous alcohol use.       Family History:       Reviewed in detail and non contributory          Problem Relation Age of Onset    Diabetes Mother     Cancer Mother        REVIEW OF SYSTEMS:   Pertinent positives as noted in the HPI. All other systems reviewed and negative. PHYSICAL EXAM PERFORMED:    /79   Pulse 95   Temp 98.7 °F (37.1 °C) (Oral)   Resp 14   Ht 6' 2\" (1.88 m)   Wt 260 lb 9.3 oz (118.2 kg)   SpO2 94%   BMI 33.46 kg/m²     General appearance:  No apparent distress, cooperative. HEENT:  Normal cephalic, atraumatic without obvious deformity. Conjunctivae/corneas clear. Neck: Supple, with full range of motion. No cervical lymphadenopathy  Respiratory:  Normal respiratory effort. Clear to auscultation, bilaterally without Rales/Wheezes/Rhonchi. Cardiovascular:  Regular rate and rhythm with normal S1/S2 without murmurs, rubs or gallops. Abdomen: Soft, non-tender, non-distended, normal bowel sounds. Musculoskeletal:  No edema noted bilaterally. No tenderness on palpation   Skin: He has a rash on his lumbosacral area,, tender to touch, red, warm to touch  Neurologic:  Neurologically intact without any focal sensory/motor deficits. grossly non-focal.  Psychiatric:  Alert and oriented, normal mood  Peripheral Pulses: +2 palpable, equal bilaterally       Labs:     Recent Labs     10/07/20  1208   WBC 7.2   HGB 14.1   HCT 42.0        Recent Labs     10/07/20  1208   *   K 4.3   CL 98*   CO2 24   BUN 18   CREATININE 1.0   CALCIUM 9.7     Recent Labs     10/07/20  1208   AST 21   ALT 24   BILITOT 0.7   ALKPHOS 126     No results for input(s): INR in the last 72 hours.   Recent Labs     10/07/20  1208   TROPONINI <0.01       Urinalysis:      Lab Results   Component Value Date    NITRU Negative 10/07/2020    WBCUA 1 12/05/2019    RBCUA 2 12/05/2019    BLOODU Negative 10/07/2020    SPECGRAV >1.030 10/07/2020    GLUCOSEU >=1000 10/07/2020    GLUCOSEU 100 07/14/2010       Radiology:       CT ABDOMEN PELVIS W IV CONTRAST Additional Contrast? None   Final Result   Unremarkable CT abdomen and pelvis. No evidence for soft tissue abscess. Minimal skin thickening is evident posteriorly                 Active Hospital Problems    Diagnosis Date Noted    Cellulitis [L03.90] 10/07/2020       Patient is a 72-year-old male with past medical history of diabetes mellitus 2, CAD, tobacco use who presents to the hospital for rash and burning sensation on lower back. According to the patient for the past 4 days he has rash on his lower back which is increasing in size, more associated with burning sensation and painful to touch. Patient also mentions he had chills however no fevers. He endorses he was recently at a camp but does not remember any tick bites, spider bites. He denies nausea vomiting fevers diarrhea constipation dysuria. Assessment  rash, erythema, warmth secondary to cellulitis lumbosacral area  Lactic acidosis  Diabetes mellitus 2  Hypertension  Hyperlipidemia  Coronary artery disease      Plan  Will initiate IV vancomycin, blood cultures have been ordered, area has been highlighted with a marker to monitor progression  Monitor lactic acid, gentle IV fluid therapy with normal saline, hold off on home Lasix  Insulin sliding scale, diabetic diet  Resume home aspirin, statin  DVT prophylaxis with Lovenox  Diet: DIET CARB CONTROL; Carb Control: 3 carb choices (45 gms)/meal  Code Status: Full Code    PT/OT Eval Status: ordered    Dispo - pending clinical improvement       Zaina Irizarry MD    The note was completed using EMR and Dragon dictation system. Every effort was made to ensure accuracy; however, inadvertent computerized transcription errors may be present. Thank you Gene Goodell, DO for the opportunity to be involved in this patient's care. If you have any questions or concerns please feel free to contact me at [phone].     Zaina Irizarry MD

## 2020-10-09 VITALS
WEIGHT: 263.67 LBS | BODY MASS INDEX: 33.84 KG/M2 | HEIGHT: 74 IN | OXYGEN SATURATION: 95 % | RESPIRATION RATE: 15 BRPM | DIASTOLIC BLOOD PRESSURE: 73 MMHG | HEART RATE: 77 BPM | TEMPERATURE: 97.8 F | SYSTOLIC BLOOD PRESSURE: 118 MMHG

## 2020-10-09 LAB
ANION GAP SERPL CALCULATED.3IONS-SCNC: 10 MMOL/L (ref 3–16)
BUN BLDV-MCNC: 11 MG/DL (ref 7–20)
CALCIUM SERPL-MCNC: 8.4 MG/DL (ref 8.3–10.6)
CHLORIDE BLD-SCNC: 105 MMOL/L (ref 99–110)
CO2: 21 MMOL/L (ref 21–32)
CREAT SERPL-MCNC: 0.8 MG/DL (ref 0.8–1.3)
GFR AFRICAN AMERICAN: >60
GFR NON-AFRICAN AMERICAN: >60
GLUCOSE BLD-MCNC: 215 MG/DL (ref 70–99)
GLUCOSE BLD-MCNC: 237 MG/DL (ref 70–99)
HCT VFR BLD CALC: 38.1 % (ref 40.5–52.5)
HEMOGLOBIN: 12.6 G/DL (ref 13.5–17.5)
MAGNESIUM: 2.2 MG/DL (ref 1.8–2.4)
MCH RBC QN AUTO: 27.8 PG (ref 26–34)
MCHC RBC AUTO-ENTMCNC: 33.2 G/DL (ref 31–36)
MCV RBC AUTO: 83.8 FL (ref 80–100)
MRSA SCREEN RT-PCR: ABNORMAL
ORGANISM: ABNORMAL
PDW BLD-RTO: 13.8 % (ref 12.4–15.4)
PERFORMED ON: ABNORMAL
PLATELET # BLD: 144 K/UL (ref 135–450)
PMV BLD AUTO: 8.6 FL (ref 5–10.5)
POTASSIUM REFLEX MAGNESIUM: 3.5 MMOL/L (ref 3.5–5.1)
RBC # BLD: 4.54 M/UL (ref 4.2–5.9)
SODIUM BLD-SCNC: 136 MMOL/L (ref 136–145)
VANCOMYCIN TROUGH: 9.4 UG/ML (ref 10–20)
WBC # BLD: 5.5 K/UL (ref 4–11)

## 2020-10-09 PROCEDURE — 80048 BASIC METABOLIC PNL TOTAL CA: CPT

## 2020-10-09 PROCEDURE — 85027 COMPLETE CBC AUTOMATED: CPT

## 2020-10-09 PROCEDURE — 94760 N-INVAS EAR/PLS OXIMETRY 1: CPT

## 2020-10-09 PROCEDURE — 2580000003 HC RX 258: Performed by: INTERNAL MEDICINE

## 2020-10-09 PROCEDURE — 83735 ASSAY OF MAGNESIUM: CPT

## 2020-10-09 PROCEDURE — 6370000000 HC RX 637 (ALT 250 FOR IP): Performed by: INTERNAL MEDICINE

## 2020-10-09 PROCEDURE — 6360000002 HC RX W HCPCS: Performed by: INTERNAL MEDICINE

## 2020-10-09 PROCEDURE — 36415 COLL VENOUS BLD VENIPUNCTURE: CPT

## 2020-10-09 PROCEDURE — 80202 ASSAY OF VANCOMYCIN: CPT

## 2020-10-09 RX ORDER — SULFAMETHOXAZOLE AND TRIMETHOPRIM 800; 160 MG/1; MG/1
1 TABLET ORAL 2 TIMES DAILY
Qty: 20 TABLET | Refills: 0 | Status: SHIPPED | OUTPATIENT
Start: 2020-10-09 | End: 2020-10-19

## 2020-10-09 RX ADMIN — MESALAMINE 1000 MG: 500 CAPSULE ORAL at 08:42

## 2020-10-09 RX ADMIN — ASPIRIN 81 MG: 81 TABLET, CHEWABLE ORAL at 08:42

## 2020-10-09 RX ADMIN — SODIUM CHLORIDE: 9 INJECTION, SOLUTION INTRAVENOUS at 05:36

## 2020-10-09 RX ADMIN — ENOXAPARIN SODIUM 40 MG: 40 INJECTION SUBCUTANEOUS at 08:42

## 2020-10-09 RX ADMIN — VANCOMYCIN HYDROCHLORIDE 1250 MG: 10 INJECTION, POWDER, LYOPHILIZED, FOR SOLUTION INTRAVENOUS at 11:29

## 2020-10-09 RX ADMIN — INSULIN LISPRO 4 UNITS: 100 INJECTION, SOLUTION INTRAVENOUS; SUBCUTANEOUS at 08:43

## 2020-10-09 NOTE — PROGRESS NOTES
Data- discharge order received, pt verbalized agreement to discharge, disposition to previous residence, no needs for HHC/DME. Action- discharge instructions prepared and given to patient and his wife, pt verbalized understanding. Medication information packet given r/t NEW and/or CHANGED prescriptions emphasizing name/purpose/side effects, pt verbalized understanding. Discharge instruction summary: Diet- carb control, Activity- as tolerated, Primary Care Physician as follows: Isamar Franco [place] [phone] f/u appointment in 7-10 days, immunizations reviewed and pt educated to review with PCP, prescription medications filled at [facility] in [place]. I  Response- Pt belongings gathered, IV removed. Disposition is home (no HHC/DME needs), transported with pt ambulatory to main entrance with wife, refused wheelchair.  Electronically signed by Christiana Espinoza RN on 10/9/2020 at 2:27 PM

## 2020-10-09 NOTE — PLAN OF CARE
Problem: Pain:  Goal: Pain level will decrease  Description: Pain level will decrease  10/9/2020 1049 by Norma Bolivar RN  Outcome: Ongoing  Note: Pt assessed for pain. Pt in pain and assessed with 0-10 pain rating scale. Pt given prescribed analgesic for pain. (See eMar) Pt satisfied with pain relief thus far. Will reassess and continue to monitor. Electronically signed by Real Lei RN on 10/9/2020 at 10:49 AM    10/8/2020 2340 by Georgette Chu RN  Outcome: Ongoing  Note: Educated patient on pain management. Will assess patients pain level per unit protocol, and provide pain management measures as needed. Goal: Control of acute pain  Description: Control of acute pain  10/9/2020 1049 by Real Lei RN  Outcome: Ongoing  10/8/2020 2340 by Georgette Chu RN  Outcome: Ongoing  Note: Patient educated on acute pain. Taught patient to use call light to ask for pain medication. PRN pain medication given for acute pain. Will continue to monitor pain per unit protocol. Goal: Control of chronic pain  Description: Control of chronic pain  10/9/2020 1049 by Real Lei RN  Outcome: Ongoing  10/8/2020 2340 by Georgette Chu RN  Outcome: Ongoing  Note: Patient educated on chronic pain. Taught patient to use call light to ask for pain medication. Will continue to monitor pain per unit protocol. Goal: Patient's pain/discomfort is manageable  Description: Patient's pain/discomfort is manageable  10/9/2020 1049 by Real Lei RN  Outcome: Ongoing  10/8/2020 2340 by Georgette Chu RN  Outcome: Ongoing  Note: Educated patient on pain management. Will assess patients pain level per unit protocol, and provide pain management measures as needed.

## 2020-10-09 NOTE — PLAN OF CARE
Problem: Pain:  Goal: Pain level will decrease  Description: Pain level will decrease  Outcome: Ongoing  Note: Educated patient on pain management. Will assess patients pain level per unit protocol, and provide pain management measures as needed. Goal: Control of acute pain  Description: Control of acute pain  Outcome: Ongoing  Note: Patient educated on acute pain. Taught patient to use call light to ask for pain medication. PRN pain medication given for acute pain. Will continue to monitor pain per unit protocol. Goal: Control of chronic pain  Description: Control of chronic pain  Outcome: Ongoing  Note: Patient educated on chronic pain. Taught patient to use call light to ask for pain medication. Will continue to monitor pain per unit protocol. Goal: Patient's pain/discomfort is manageable  Description: Patient's pain/discomfort is manageable  Outcome: Ongoing  Note: Educated patient on pain management. Will assess patients pain level per unit protocol, and provide pain management measures as needed. Problem: Infection:  Goal: Will remain free from infection  Description: Will remain free from infection  Outcome: Ongoing     Problem: Safety:  Goal: Free from accidental physical injury  Description: Free from accidental physical injury  Outcome: Ongoing  Note: Pt assessed for fall risk and fall precautions put into place. Bed in lowest position and wheels locked, call light within reach. Nonskid footwear in place. Patient educated on appropriate method of transfer and to call for assistance. Goal: Free from intentional harm  Description: Free from intentional harm  Outcome: Ongoing  Note: Patient remains free from intentional harm, no signs or symptoms of intention to harm noted. Will continue to monitor patient throughout shift.         Problem: Daily Care:  Goal: Daily care needs are met  Description: Daily care needs are met  Outcome: Ongoing  Note: Patient assessed to determine ability to perform daily needs and ADLs. Patient assisted with any daily needs that were not able to be met individually by patient. [place] encouraged, although patient educated to ask for assistance when needed. Will continue to monitor and assist patient with meeting daily care needs as needed. Problem: Skin Integrity:  Goal: Skin integrity will stabilize  Description: Skin integrity will stabilize  Outcome: Ongoing  Note: Will monitor skin and mucous members. Will turn patient every 2 hours, monitor for friction and sheering, and change dressings as needed. Will preform skin assessment every shift. Problem: Discharge Planning:  Goal: Patients continuum of care needs are met  Description: Patients continuum of care needs are met  Outcome: Ongoing  Note: Assessed patients knowledge of discharge. Will continue to work with patient on discharge planning and answer patient questions. Will consult case management and  as necessary.

## 2020-10-09 NOTE — DISCHARGE SUMMARY
Hospital Medicine Discharge Summary    Patient ID: Riri Hernandez      Patient's PCP: Jennifer Palma DO    Admit Date: 10/7/2020     Discharge Date:   10/9/2020    Admitting Physician: Cristino Fang MD     Discharge Physician: GISSEL Devi - CNP     Discharge Diagnoses: Active Hospital Problems    Diagnosis Date Noted    Cellulitis [L03.90] 10/07/2020       The patient was seen and examined on day of discharge and this discharge summary is in conjunction with any daily progress note from day of discharge. Hospital Course:     Patient is a 81-year-old male with past medical history of diabetes mellitus 2, CAD, tobacco use who presents to the hospital for rash and burning sensation on lower back.  According to the patient for the past 4 days he has rash on his lower back which is increasing in size, more associated with burning sensation and painful to touch.  Patient also mentions he had chills however no fevers.  He endorses he was recently at a camp but does not remember any tick bites, spider bites. Gardenia Resendez denies nausea vomiting fevers diarrhea constipation dysuria.        Cellulitis-Acute-improving  CT negative for abscess  -IV Vanco and Levaquin  -Blood Cx obtained  -area has been highlighted with a marker to monitor progression- erythema is improving  -CBC white count 7.5  -ASO,CRP, Procal and ESR ordered     Lactic Acididosis-resolved  -Initial lactic acid 2.2- repeated today  -gentle IV fluid therapy with normal saline     HTN-Chronic and Stable  Continue Metoprolol hold Lasix     DM-Chronic and Stable  Insulin sliding scale, diabetic diet      Resume home aspirin, statin    Exam:     /73   Pulse 77   Temp 97.8 °F (36.6 °C) (Oral)   Resp 15   Ht 6' 2\" (1.88 m)   Wt 263 lb 10.7 oz (119.6 kg)   SpO2 94%   BMI 33.85 kg/m²     General appearance: No apparent distress, appears stated age and cooperative. HEENT: Pupils equal, round, and reactive to light.  Conjunctivae/corneas clear.  Neck: Supple, with full range of motion. No jugular venous distention. Trachea midline. Respiratory:  Normal respiratory effort. Clear to auscultation, bilaterally without Rales/Wheezes/Rhonchi. Cardiovascular: Regular rate and rhythm with normal S1/S2 without murmurs, rubs or gallops. Abdomen: Soft, non-tender, non-distended with normal bowel sounds. Musculoskeletal: No clubbing, cyanosis or edema bilaterally. Full range of motion without deformity. Skin: Skin color, texture, turgor normal.  No rashes or lesions. Lower lumbar region with redness and tenderness r>L, the marking around the cellulitis shows redness retracting  Neurologic:  Neurovascularly intact without any focal sensory/motor deficits. Cranial nerves: II-XII intact, grossly non-focal.  Psychiatric: Alert and oriented, thought content appropriate, normal insight      Consults:     IP CONSULT TO PHARMACY  PHARMACY TO DOSE VANCOMYCIN    Significant Diagnostic Studies:     CT ABDOMEN PELVIS W IV CONTRAST Additional Contrast? None   Final Result   Unremarkable CT abdomen and pelvis. No evidence for soft tissue abscess. Minimal skin thickening is evident posteriorly             Disposition:  home     Condition at Discharge: Stable    Discharge Instructions/Follow-up:  See PCP 1 week    Code Status:  Full Code     Activity: activity as tolerated    Diet: diabetic diet      Labs:  For convenience and continuity at follow-up the following most recent labs are provided:      CBC:    Lab Results   Component Value Date    WBC 5.5 10/09/2020    HGB 12.6 10/09/2020    HCT 38.1 10/09/2020     10/09/2020       Renal:    Lab Results   Component Value Date     10/09/2020    K 3.5 10/09/2020     10/09/2020    CO2 21 10/09/2020    BUN 11 10/09/2020    CREATININE 0.8 10/09/2020    CALCIUM 8.4 10/09/2020    PHOS 3.7 03/11/2020       Discharge Medications:     Current Discharge Medication List           Details sulfamethoxazole-trimethoprim (BACTRIM DS;SEPTRA DS) 800-160 MG per tablet Take 1 tablet by mouth 2 times daily for 10 days  Qty: 20 tablet, Refills: 0              Details   metFORMIN (GLUCOPHAGE-XR) 500 MG extended release tablet Take 1 tablet by mouth 2 times daily (with meals)  Qty: 60 tablet, Refills: 2      insulin glargine (LANTUS SOLOSTAR) 100 UNIT/ML injection pen 40 units daily  Qty: 30 mL, Refills: 0    Associated Diagnoses: Type 2 diabetes mellitus without complication, without long-term current use of insulin ([facility])      nitroGLYCERIN (NITROSTAT) 0.4 MG SL tablet Place 1 tablet under the tongue every 5 minutes as needed for Chest pain  Qty: 25 tablet, Refills: 3    Associated Diagnoses: NSTEMI (non-ST elevated myocardial infarction) ([facility] Coronary artery disease involving native coronary artery of native heart without angina pectoris      mesalamine (PENTASA) 500 MG extended release capsule Take 2 capsules by mouth 2 times daily  Qty: 120 capsule, Refills: 3      glipiZIDE (GLUCOTROL XL) 5 MG extended release tablet Take 1 tablet by mouth daily  Qty: 90 tablet, Refills: 3      metoprolol succinate (TOPROL XL) 25 MG extended release tablet Take 0.5 tablets by mouth nightly  Qty: 90 tablet, Refills: 3    Comments: Please keep appt on 10. 6.17  Associated Diagnoses: Coronary artery disease involving native heart without angina pectoris, unspecified vessel or lesion type      furosemide (LASIX) 20 MG tablet TAKE 1 TABLET DAILY  Qty: 90 tablet, Refills: 4    Associated Diagnoses: Ischemic cardiomyopathy      atorvastatin (LIPITOR) 40 MG tablet Take 1 tablet by mouth nightly  Qty: 90 tablet, Refills: 3    Associated Diagnoses: Coronary artery disease involving native heart without angina pectoris, unspecified vessel or lesion type; Hyperlipidemia, unspecified hyperlipidemia type      aspirin 81 MG chewable tablet Take 1 tablet by mouth daily  Qty: 30 tablet, Refills: 3      !!  Insulin Pen Needle (PEN NEEDLES) 32G X 4 MM MISC 1 Container by Does not apply route every morning (before breakfast)  Qty: 200 each, Refills: 0      !! Insulin Pen Needle (B-D UF III MINI PEN NEEDLES) 31G X 5 MM MISC 4 times a day  Qty: 300 each, Refills: 6      glucose (GLUTOSE) 40 % GEL Take 37.5 mLs by mouth as needed (low blood sugar (less than 70))  Qty: 45 g, Refills: 1      inFLIXimab (REMICADE) 100 MG injection Infuse 5 mg/kg intravenously See Admin Instructions q 6-8 wks      FREESTYLE LANCETS MISC 1 each by Does not apply route daily  Qty: 200 each, Refills: 3    Associated Diagnoses: Type 2 diabetes mellitus without complication, unspecified long term insulin use status      glucose monitoring kit (FREESTYLE) monitoring kit 1 kit by Does not apply route daily as needed (BS checks)  Qty: 1 kit, Refills: 0      glucose blood VI test strips (FREESTYLE TEST STRIPS) strip 1 each by In Vitro route daily As needed. Qty: 100 each, Refills: 3       !! - Potential duplicate medications found. Please discuss with provider. Future Appointments   Date Time Provider Negin Dodge   11/17/2020  2:45 PM Sofia Reynolds MD [facility]       Time Spent on discharge is more than 45 minutes in the examination, evaluation, counseling and review of medications and discharge plan. Signed:    GISSEL Quach CNP   10/9/2020      Thank you Shay Centeno DO for the opportunity to be involved in this patient's care. If you have any questions or concerns please feel free to contact me at [phone].   Dispo - hopefully tomorrow    GISSEL Palmer - CNP

## 2020-10-11 LAB
ANTISTREPTOLYSIN-O: 91 IU/ML (ref 0–200)
BLOOD CULTURE, ROUTINE: NORMAL
CULTURE, BLOOD 2: NORMAL
STREPTOZYME: POSITIVE

## 2020-10-13 ENCOUNTER — TELEPHONE (OUTPATIENT)
Dept: FAMILY MEDICINE CLINIC | Age: 65
End: 2020-10-13

## 2020-10-13 RX ORDER — AMOXICILLIN 875 MG/1
875 TABLET, COATED ORAL 2 TIMES DAILY
Qty: 20 TABLET | Refills: 0 | Status: SHIPPED | OUTPATIENT
Start: 2020-10-13 | End: 2020-10-23

## 2020-10-13 NOTE — TELEPHONE ENCOUNTER
Johana pts wife is calling stating she just missed a call from someone but was unsure who called her please advise

## 2020-10-14 ENCOUNTER — TELEPHONE (OUTPATIENT)
Dept: FAMILY MEDICINE CLINIC | Age: 65
End: 2020-10-14

## 2020-10-14 NOTE — TELEPHONE ENCOUNTER
Pt wife called back to ask Dr Codi Lainez if the pt should stop the Bactrim and start the Amoxicillin or take them together.  Please give Nathaly Varnerr a call [phone]

## 2020-10-27 ENCOUNTER — OFFICE VISIT (OUTPATIENT)
Dept: FAMILY MEDICINE CLINIC | Age: 65
End: 2020-10-27
Payer: MEDICARE

## 2020-10-27 VITALS
HEIGHT: 74 IN | BODY MASS INDEX: 34.01 KG/M2 | TEMPERATURE: 97.2 F | WEIGHT: 265 LBS | DIASTOLIC BLOOD PRESSURE: 70 MMHG | SYSTOLIC BLOOD PRESSURE: 130 MMHG

## 2020-10-27 PROCEDURE — G8484 FLU IMMUNIZE NO ADMIN: HCPCS | Performed by: FAMILY MEDICINE

## 2020-10-27 PROCEDURE — 3017F COLORECTAL CA SCREEN DOC REV: CPT | Performed by: FAMILY MEDICINE

## 2020-10-27 PROCEDURE — G8427 DOCREV CUR MEDS BY ELIG CLIN: HCPCS | Performed by: FAMILY MEDICINE

## 2020-10-27 PROCEDURE — 1111F DSCHRG MED/CURRENT MED MERGE: CPT | Performed by: FAMILY MEDICINE

## 2020-10-27 PROCEDURE — 99214 OFFICE O/P EST MOD 30 MIN: CPT | Performed by: FAMILY MEDICINE

## 2020-10-27 PROCEDURE — 3046F HEMOGLOBIN A1C LEVEL >9.0%: CPT | Performed by: FAMILY MEDICINE

## 2020-10-27 PROCEDURE — 1036F TOBACCO NON-USER: CPT | Performed by: FAMILY MEDICINE

## 2020-10-27 PROCEDURE — G8417 CALC BMI ABV UP PARAM F/U: HCPCS | Performed by: FAMILY MEDICINE

## 2020-10-27 PROCEDURE — 2022F DILAT RTA XM EVC RTNOPTHY: CPT | Performed by: FAMILY MEDICINE

## 2020-10-27 RX ORDER — INSULIN GLARGINE 100 [IU]/ML
INJECTION, SOLUTION SUBCUTANEOUS
Qty: 30 ML | Refills: 0 | Status: SHIPPED | OUTPATIENT
Start: 2020-10-27 | End: 2021-01-25 | Stop reason: SDUPTHER

## 2020-10-27 NOTE — PROGRESS NOTES
10/27/2020     Mary Nassar (:  1955) is a 60 y.o. male, here for evaluation of the following medical concerns:    HPI     1. Skin infection- follow up hospital visit, across buttock last month was in hospital, for several days, resolved, labs revealed elevated ESR, CRP but WBC was wnl? Started on IV antibiotics, released on Bactrim, had Amoxicillin added due to steptozyme being positive, feels well today, believes he is back to baseline. 2.  DM II- patient is tring to eat a balanced healthy diet, A1C today was 9.7, down from 12.2 in July. Patent is taking medication as prescribed, monitor diet, it was 7.8 prior to [address]- follows with Dr. Kellie Miranda for his Crones diseases, feels this is well controlled at this time. 4. CAD- patient follows with Cardiology, had NSTEMI in 2017, had cardiac cath SHAN X 2, continues to follow and take mediation as prescribed. He denied pain or discomfort today. Today, denied chest pain, sob, n, v, or diarrhea. Review of Systems   Constitutional: Negative for activity change, fatigue, fever and unexpected weight change. HENT: Negative for congestion, ear pain, rhinorrhea, sinus pressure, sore throat and trouble swallowing. Eyes: Negative for visual disturbance. Respiratory: Negative for cough, chest tightness, shortness of breath and wheezing. Cardiovascular: Negative for chest pain, palpitations and leg swelling. Gastrointestinal: Negative for abdominal pain, anal bleeding, blood in stool, diarrhea, nausea and vomiting. Musculoskeletal: Negative for arthralgias. Skin: Negative for rash. Neurological: Negative for dizziness, syncope, light-headedness and headaches. Psychiatric/Behavioral: Negative for dysphoric mood. The patient is not nervous/anxious. Prior to Visit Medications    Medication Sig Taking?  Authorizing Provider   metFORMIN (GLUCOPHAGE) 500 MG tablet Take 1 tablet by mouth 2 times daily (with meals) Yes Edwin Bond, DO   insulin glargine (LANTUS SOLOSTAR) 100 UNIT/ML injection pen 40 units daily Yes Edwin Bond, DO   Insulin Pen Needle (PEN NEEDLES) 32G X 4 MM MISC 1 Container by Does not apply route every morning (before breakfast)  Camelia Bonner DO   nitroGLYCERIN (NITROSTAT) 0.4 MG SL tablet Place 1 tablet under the tongue every 5 minutes as needed for Chest pain  Camelia Bonner DO   mesalamine (PENTASA) 500 MG extended release capsule Take 2 capsules by mouth 2 times daily  Ike Yoo MD   glipiZIDE (GLUCOTROL XL) 5 MG extended release tablet Take 1 tablet by mouth daily  Ike Yoo MD   metoprolol succinate (TOPROL XL) 25 MG extended release tablet Take 0.5 tablets by mouth nightly  Diana Murillo MD   Insulin Pen Needle (B-D UF III MINI PEN NEEDLES) 31G X 5 MM MISC 4 times a day  Ike Yoo MD   furosemide (LASIX) 20 MG tablet TAKE 1 TABLET DAILY  Ike Yoo MD   atorvastatin (LIPITOR) 40 MG tablet Take 1 tablet by mouth nightly  Ike Yoo MD   glucose (GLUTOSE) 40 % GEL Take 37.5 mLs by mouth as needed (low blood sugar (less than 70))  Virgen Malone MD   inFLIXimab (REMICADE) 100 MG injection Infuse 5 mg/kg intravenously See Admin Instructions q 6-8 wks  Historical Provider, MD   FREESTYLE LANCETS MISC 1 each by Does not apply route daily  Ike Yoo MD   aspirin 81 MG chewable tablet Take 1 tablet by mouth daily  Brennan Felty, MD   glucose monitoring kit (FREESTYLE) monitoring kit 1 kit by Does not apply route daily as needed (BS checks)  Brennan Felty, MD   glucose blood VI test strips (FREESTYLE TEST STRIPS) strip 1 each by In Vitro route daily As needed.   Brennan Felty, MD        Social History     Tobacco Use    Smoking status: Former Smoker     Packs/day: 1.50     Years: 10.00     Pack years: 15.00     Types: Cigarettes     Last attempt to quit: 8/12/2017     Years since quitting: 3.2    Smokeless tobacco: Former User     Quit date: 8/12/2017   Substance Use Topics  Alcohol use: Not Currently     Comment: rarely        Vitals:    10/27/20 1511   BP: 130/70   Temp: 97.2 °F (36.2 °C)   Weight: 265 lb (120.2 kg)   Height: 6' 2\" (1.88 m)     Estimated body mass index is 34.02 kg/m² as calculated from the following:    Height as of this encounter: 6' 2\" (1.88 m). Weight as of this encounter: 265 lb (120.2 kg). Physical Exam  Vitals signs and nursing note reviewed. Constitutional:       Appearance: He is well-developed. HENT:      Head: Normocephalic and atraumatic. Right Ear: External ear normal.      Left Ear: External ear normal.   Neck:      Musculoskeletal: Neck supple. Thyroid: No thyromegaly. Cardiovascular:      Rate and Rhythm: Normal rate and regular rhythm. Heart sounds: No murmur. Pulmonary:      Effort: Pulmonary effort is normal.      Breath sounds: Normal breath sounds. No wheezing or rales. Abdominal:      General: Bowel sounds are normal.      Palpations: Abdomen is soft. Tenderness: There is no abdominal tenderness. Lymphadenopathy:      Cervical: No cervical adenopathy. Skin:     Findings: No erythema or rash. Neurological:      Mental Status: He is alert and oriented to person, place, and time. Psychiatric:         Behavior: Behavior normal.         Judgment: Judgment normal.         ASSESSMENT/PLAN:  1. Type 2 diabetes mellitus without complication, without long-term current use of insulin ([address]  Patient will need to keep a log of his glucose readings and bring them to the office. He needs to monitor his diet and exercise. Attempt to lose weight. Discussed proper eating habits. Continue to take medication as prescribed. Will obtain A1C at this visit. Educated on signs and symptoms for immediate evaluation in the ER.   - insulin glargine (LANTUS SOLOSTAR) 100 UNIT/ML injection pen; 40 units daily  Dispense: 30 mL; Refill: 0  - CBC Auto Differential; Future  - Comprehensive Metabolic Panel;  Future  - Sedimentation Rate; Future  - C-REACTIVE PROTEIN; Future  - Lipid Panel; Future    2. Crohn's disease without complication, unspecified gastrointestinal tract location ([facility]  Stable  Continue with medication  Keep appointments with specialist.   Answered questions  - CBC Auto Differential; Future  - Comprehensive Metabolic Panel; Future  - Sedimentation Rate; Future  - C-REACTIVE PROTEIN; Future    3. Cellulitis of buttock  Resolved  Patient will have repeat labs  - CBC Auto Differential; Future  - Comprehensive Metabolic Panel; Future  - Sedimentation Rate; Future  - C-REACTIVE PROTEIN; Future    4. NSTEMI (non-ST elevated myocardial infarction) ([facility]  Stable  Continue with medication  Keep appointments with specialist.   Edin Yip questions      Return in about 3 months (around 1/27/2021).

## 2020-11-01 ASSESSMENT — ENCOUNTER SYMPTOMS
DIARRHEA: 0
VOMITING: 0
CHEST TIGHTNESS: 0
BLOOD IN STOOL: 0
COUGH: 0
SINUS PRESSURE: 0
SHORTNESS OF BREATH: 0
ANAL BLEEDING: 0
WHEEZING: 0
SORE THROAT: 0
ABDOMINAL PAIN: 0
TROUBLE SWALLOWING: 0
NAUSEA: 0
RHINORRHEA: 0

## 2020-11-02 ENCOUNTER — TELEPHONE (OUTPATIENT)
Dept: FAMILY MEDICINE CLINIC | Age: 65
End: 2020-11-02

## 2020-11-02 DIAGNOSIS — K50.90 CROHN'S DISEASE WITHOUT COMPLICATION, UNSPECIFIED GASTROINTESTINAL TRACT LOCATION (HCC): ICD-10-CM

## 2020-11-02 DIAGNOSIS — E11.9 TYPE 2 DIABETES MELLITUS WITHOUT COMPLICATION, WITHOUT LONG-TERM CURRENT USE OF INSULIN (HCC): ICD-10-CM

## 2020-11-02 DIAGNOSIS — L03.317 CELLULITIS OF BUTTOCK: ICD-10-CM

## 2020-11-02 LAB
A/G RATIO: 1.2 (ref 1.1–2.2)
ALBUMIN SERPL-MCNC: 4 G/DL (ref 3.4–5)
ALP BLD-CCNC: 101 U/L (ref 40–129)
ALT SERPL-CCNC: 31 U/L (ref 10–40)
ANION GAP SERPL CALCULATED.3IONS-SCNC: 13 MMOL/L (ref 3–16)
AST SERPL-CCNC: 20 U/L (ref 15–37)
BASOPHILS ABSOLUTE: 0 K/UL (ref 0–0.2)
BASOPHILS RELATIVE PERCENT: 0.2 %
BILIRUB SERPL-MCNC: 0.5 MG/DL (ref 0–1)
BUN BLDV-MCNC: 17 MG/DL (ref 7–20)
C-REACTIVE PROTEIN: 2.1 MG/L (ref 0–5.1)
CALCIUM SERPL-MCNC: 9.3 MG/DL (ref 8.3–10.6)
CHLORIDE BLD-SCNC: 101 MMOL/L (ref 99–110)
CHOLESTEROL, TOTAL: 108 MG/DL (ref 0–199)
CO2: 24 MMOL/L (ref 21–32)
CREAT SERPL-MCNC: 0.9 MG/DL (ref 0.8–1.3)
EOSINOPHILS ABSOLUTE: 0.2 K/UL (ref 0–0.6)
EOSINOPHILS RELATIVE PERCENT: 4.4 %
GFR AFRICAN AMERICAN: >60
GFR NON-AFRICAN AMERICAN: >60
GLOBULIN: 3.4 G/DL
GLUCOSE BLD-MCNC: 357 MG/DL (ref 70–99)
HCT VFR BLD CALC: 42.7 % (ref 40.5–52.5)
HDLC SERPL-MCNC: 42 MG/DL (ref 40–60)
HEMOGLOBIN: 14.2 G/DL (ref 13.5–17.5)
LDL CHOLESTEROL CALCULATED: 12 MG/DL
LYMPHOCYTES ABSOLUTE: 2.4 K/UL (ref 1–5.1)
LYMPHOCYTES RELATIVE PERCENT: 43.5 %
MCH RBC QN AUTO: 28.3 PG (ref 26–34)
MCHC RBC AUTO-ENTMCNC: 33.1 G/DL (ref 31–36)
MCV RBC AUTO: 85.5 FL (ref 80–100)
MONOCYTES ABSOLUTE: 0.5 K/UL (ref 0–1.3)
MONOCYTES RELATIVE PERCENT: 9 %
NEUTROPHILS ABSOLUTE: 2.3 K/UL (ref 1.7–7.7)
NEUTROPHILS RELATIVE PERCENT: 42.9 %
PDW BLD-RTO: 14.3 % (ref 12.4–15.4)
PLATELET # BLD: 135 K/UL (ref 135–450)
PMV BLD AUTO: 9.1 FL (ref 5–10.5)
POTASSIUM SERPL-SCNC: 4.8 MMOL/L (ref 3.5–5.1)
RBC # BLD: 5 M/UL (ref 4.2–5.9)
SEDIMENTATION RATE, ERYTHROCYTE: 17 MM/HR (ref 0–20)
SODIUM BLD-SCNC: 138 MMOL/L (ref 136–145)
TOTAL PROTEIN: 7.4 G/DL (ref 6.4–8.2)
TRIGL SERPL-MCNC: 270 MG/DL (ref 0–150)
VLDLC SERPL CALC-MCNC: 54 MG/DL
WBC # BLD: 5.4 K/UL (ref 4–11)

## 2020-11-02 NOTE — TELEPHONE ENCOUNTER
----- Message from Nhan Degroot sent at 11/2/2020  9:54 AM EST -----  Subject: Message to Provider    QUESTIONS  Information for Provider? Patient wife called to say he is getting blood   work today and that he forgot last week . Just an fyi.  ---------------------------------------------------------------------------  --------------  [address]  What is the best way for the office to contact you? OK to leave message on   voicemail  Preferred Call Back Phone Number? [phone]  ---------------------------------------------------------------------------  --------------  SCRIPT ANSWERS  Relationship to Patient?  Self

## 2020-11-16 NOTE — PROGRESS NOTES
[facility] [place]  Office Visit    Dillon Holloway  1955 November 17, 2020    CC: \"I feel well\"     HPI: The patient is 60 y.o. male with a past medical history significant for chronic diastolic dysfunction, tobacco use, AAA, DM, CAD and Crohn's with prior resection here for follow up. Hospitalized from 8/12/2017-8/15/2017 with NSTEMI. He underwent cardiac cath which resulted in SHAN x2 to RCA. LVEF 35% initially and follow up echo showing LVEF 55-60% (done after PCI). He returns to the office today in follow-up. Today, he reports feeling well overall. He denies chest pain with rest or exertion. He does admit to some shortness of breath and attributes this to his previous smoking history. Patient denies exertional chest pain/pressure, dyspnea at rest, MONZON, PND, orthopnea, palpitations, lightheadedness, weight changes, changes in LE edema, and syncope. He reports medication compliance and is tolerating. He was a previous smoker and quit 8/2017. Review of Systems:  Constitutional: Denies weakness, night sweats or fever. HEENT: Denies new visual changes, ringing in ears, nosebleeds, nasal congestion  Respiratory: Improved SOB   Cardiovascular: see HPI  GI: Denies N/V, diarrhea, constipation, abdominal pain. + fistula  : Denies urinary frequency, urgency, incontinence, hematuria or dysuria. Skin: Denies rash, hives, or cyanosis  Musculoskeletal:chronic joint pain  Neurological: Denies syncope or TIA-like symptoms.   Psychiatric: Denies anxiety, insomnia or depression     Social History     Tobacco Use    Smoking status: Former Smoker     Packs/day: 1.50     Years: 10.00     Pack years: 15.00     Types: Cigarettes     Last attempt to quit: 8/12/2017     Years since quitting: 3.2    Smokeless tobacco: Former User     Quit date: 8/12/2017   Substance Use Topics    Alcohol use: Not Currently     Comment: rarely    Drug use: No       Allergies   Allergen Reactions    Codeine checks) 1 kit 0    glucose blood VI test strips (FREESTYLE TEST STRIPS) strip 1 each by In Vitro route daily As needed. 100 each 3     No current facility-administered medications for this visit. Physical Exam:   /78   Pulse 95   Temp 97.7 °F (36.5 °C)   Ht 6' 2\" (1.88 m)   Wt 267 lb 12.8 oz (121.5 kg)   SpO2 96%   BMI 34.38 kg/m²   Wt Readings from Last 2 Encounters:   11/17/20 267 lb 12.8 oz (121.5 kg)   10/27/20 265 lb (120.2 kg)     Constitutional: He is oriented to person, place, and time. He appears well-developed and well-nourished. Voicing several complaints and frustration. HEENT: Normocephalic and atraumatic. Sclerae anicteric. No xanthelasmas. EOM's intact. Neck: Neck supple. No JVD present. Carotids without bruits. No thyromegaly present. No lymphadenopathy present. Cardiovascular: RRR, normal S1 and S2; no murmur/gallop or rub, PMI nondisplaced  Pulmonary/Chest: Effort normal and breath sounds normal.  Lungs with mildly diminished breath sounds bilaterally. Chest wall nontender  Abdominal: soft, nontender, nondistended. + bowel sounds; no hepatomegaly  Extremities: No edema, cyanosis, or clubbing. Pulses are 2+ radial/DP/PT bilaterally. Cap refill brisk. Neurological: No focal deficit. Skin: Skin is warm and dry. Psychiatric: He has a normal mood and affect.  His speech is normal and behavior is normal.     Lab Review:   Lab Results   Component Value Date    TRIG 270 11/02/2020    HDL 42 11/02/2020    LDLCALC 12 11/02/2020    LDLDIRECT 76 08/13/2017    LABVLDL 54 11/02/2020     Lab Results   Component Value Date     11/02/2020    K 4.8 11/02/2020    K 3.5 10/09/2020     11/02/2020    CO2 24 11/02/2020    BUN 17 11/02/2020    CREATININE 0.9 11/02/2020    GLUCOSE 357 11/02/2020    CALCIUM 9.3 11/02/2020      Lab Results   Component Value Date    WBC 5.4 11/02/2020    HGB 14.2 11/02/2020    HCT 42.7 11/02/2020    MCV 85.5 11/02/2020     11/02/2020     ECG 11/30/17: Normal sinus rhythm without ischemia  ECG 11/1/19: Normal sinus       Procedures:     LHC/PCI 8/14/2017 (Dr. Izabela Junior)  -100% proximal-mid RCA stenosis stented with a 3.5 x 38 and 4.0 x 16 mm overlapping Synergy stents  -70% stenosis in a large OM and 95% stenosis in a narrow-caliber diagonal branch  -LVEF 35% with global dysfunction but more severe inferior wall dysfunction  -LVEDP of 20 post procedure      Imaging:     Echo 8/14/2017  Normal LV size and systolic function. Estimated ejection fraction is 60%. No valvular abnormalities. Normal study. CTA pulmonary 8/12/2017  No acute pulmonary embolus detected.       Mild aneurysmal dilation of the ascending aorta, 4.1 cm.  Coronary artery   calcifications are noted.       2 patchy areas of ground-glass airspace disease right lower lobe.  Pneumonia   is suspected.       8.1 mm size nodule left lower lobe along the diaphragmatic pleura.  This   could be within the lung parenchyma or involve the pleura.  Follow-up is   recommended, see below. Chest CT 4/3/18  Mild aneurysmal dilatation of the ascending aorta measuring up   to 4 cm, stable     Echo 5/13/19  Overall left ventricular systolic function is mildly depressed . Ejection fraction is visually estimated to be 45 %. There is moderate hypokinesis of the basal to apical septal walls. The mitral valve normal in structure and function. Mild mitral regurgitation is present. The aortic root is mildly dilated. 3.5 cm  The ascending aorta is mildly dilated. Assessment:  1. CAD involving native coronary artery of native heart without angina pectoris  2. Ascending aortic aneurysm ([address] 4cm  3. Hyperlipidemia with goal LDL<70mg/dL  4. Pulmonary nodule   5. H/O Tobacco abuse    Plan:   I think that Mr. Sharon Rueda  is entirely stable from a cardiovascular standpoint. I see no need to make any changes currently in his medical regimen.  I will have him complete a chest CT to assess for any progression of the previously noted aortic aneurysm. Should this remain stable, I would not pursue further testing. He is not endorsing any symptoms representing angina. His blood pressure is well controlled and his most recent lipid profile was favorable. I have encouraged him to increase his aerobic activity and adhere to a heart healthy diet. I will see him in office for follow up in 1 year. This note was scribed in the presence of Vinayak Nelson MD by [employer], RN. Physician Attestation:  The scribes documentation has been prepared under my direction and personally reviewed by me in its entirety. I, Dr. Burnett Europe personally performed the services described in this documentation as scribed by my RN in my presence, and I confirm that the note above accurately reflects all work, treatment, procedures, and medical decision making performed by me.

## 2020-11-17 ENCOUNTER — OFFICE VISIT (OUTPATIENT)
Dept: CARDIOLOGY CLINIC | Age: 65
End: 2020-11-17
Payer: MEDICARE

## 2020-11-17 VITALS
HEIGHT: 74 IN | WEIGHT: 267.8 LBS | BODY MASS INDEX: 34.37 KG/M2 | DIASTOLIC BLOOD PRESSURE: 78 MMHG | SYSTOLIC BLOOD PRESSURE: 120 MMHG | OXYGEN SATURATION: 96 % | TEMPERATURE: 97.7 F | HEART RATE: 95 BPM

## 2020-11-17 PROCEDURE — 99214 OFFICE O/P EST MOD 30 MIN: CPT | Performed by: INTERNAL MEDICINE

## 2020-11-17 PROCEDURE — 3017F COLORECTAL CA SCREEN DOC REV: CPT | Performed by: INTERNAL MEDICINE

## 2020-11-17 PROCEDURE — G8417 CALC BMI ABV UP PARAM F/U: HCPCS | Performed by: INTERNAL MEDICINE

## 2020-11-17 PROCEDURE — 1036F TOBACCO NON-USER: CPT | Performed by: INTERNAL MEDICINE

## 2020-11-17 PROCEDURE — G8484 FLU IMMUNIZE NO ADMIN: HCPCS | Performed by: INTERNAL MEDICINE

## 2020-11-17 PROCEDURE — G8427 DOCREV CUR MEDS BY ELIG CLIN: HCPCS | Performed by: INTERNAL MEDICINE

## 2020-11-30 ENCOUNTER — OFFICE VISIT (OUTPATIENT)
Dept: PRIMARY CARE CLINIC | Age: 65
End: 2020-11-30
Payer: MEDICARE

## 2020-11-30 PROCEDURE — G8428 CUR MEDS NOT DOCUMENT: HCPCS | Performed by: NURSE PRACTITIONER

## 2020-11-30 PROCEDURE — 99211 OFF/OP EST MAY X REQ PHY/QHP: CPT | Performed by: NURSE PRACTITIONER

## 2020-11-30 PROCEDURE — G8417 CALC BMI ABV UP PARAM F/U: HCPCS | Performed by: NURSE PRACTITIONER

## 2020-11-30 NOTE — PROGRESS NOTES
[facility] received a viral test for COVID-19. They were educated on isolation and quarantine as appropriate. For any symptoms, they were directed to seek care from their PCP, given contact information to establish with a doctor, directed to an urgent care or the emergency room.

## 2020-12-01 LAB — SARS-COV-2, NAA: NOT DETECTED

## 2020-12-28 ENCOUNTER — OFFICE VISIT (OUTPATIENT)
Dept: PRIMARY CARE CLINIC | Age: 65
End: 2020-12-28
Payer: MEDICARE

## 2020-12-28 PROCEDURE — 99211 OFF/OP EST MAY X REQ PHY/QHP: CPT | Performed by: NURSE PRACTITIONER

## 2020-12-28 PROCEDURE — G8428 CUR MEDS NOT DOCUMENT: HCPCS | Performed by: NURSE PRACTITIONER

## 2020-12-28 PROCEDURE — G8417 CALC BMI ABV UP PARAM F/U: HCPCS | Performed by: NURSE PRACTITIONER

## 2020-12-28 NOTE — PROGRESS NOTES
[facility] received a viral test for COVID-19. They were educated on isolation and quarantine as appropriate. For any symptoms, they were directed to seek care from their PCP, given contact information to establish with a doctor, directed to an urgent care or the emergency room.

## 2020-12-30 LAB — SARS-COV-2, NAA: NOT DETECTED

## 2021-01-25 ENCOUNTER — OFFICE VISIT (OUTPATIENT)
Dept: FAMILY MEDICINE CLINIC | Age: 66
End: 2021-01-25
Payer: MEDICARE

## 2021-01-25 VITALS
BODY MASS INDEX: 34.01 KG/M2 | WEIGHT: 265 LBS | TEMPERATURE: 97.3 F | SYSTOLIC BLOOD PRESSURE: 134 MMHG | DIASTOLIC BLOOD PRESSURE: 82 MMHG | HEIGHT: 74 IN

## 2021-01-25 DIAGNOSIS — I21.4 NSTEMI (NON-ST ELEVATED MYOCARDIAL INFARCTION) (HCC): ICD-10-CM

## 2021-01-25 DIAGNOSIS — K50.90 CROHN'S DISEASE WITHOUT COMPLICATION, UNSPECIFIED GASTROINTESTINAL TRACT LOCATION (HCC): ICD-10-CM

## 2021-01-25 DIAGNOSIS — E11.9 TYPE 2 DIABETES MELLITUS WITHOUT COMPLICATION, WITHOUT LONG-TERM CURRENT USE OF INSULIN (HCC): Primary | ICD-10-CM

## 2021-01-25 DIAGNOSIS — E78.2 MIXED HYPERLIPIDEMIA: ICD-10-CM

## 2021-01-25 LAB — HBA1C MFR BLD: 10.4 %

## 2021-01-25 PROCEDURE — 99214 OFFICE O/P EST MOD 30 MIN: CPT | Performed by: FAMILY MEDICINE

## 2021-01-25 PROCEDURE — 3046F HEMOGLOBIN A1C LEVEL >9.0%: CPT | Performed by: FAMILY MEDICINE

## 2021-01-25 PROCEDURE — 2022F DILAT RTA XM EVC RTNOPTHY: CPT | Performed by: FAMILY MEDICINE

## 2021-01-25 PROCEDURE — G8484 FLU IMMUNIZE NO ADMIN: HCPCS | Performed by: FAMILY MEDICINE

## 2021-01-25 PROCEDURE — 3017F COLORECTAL CA SCREEN DOC REV: CPT | Performed by: FAMILY MEDICINE

## 2021-01-25 PROCEDURE — 1036F TOBACCO NON-USER: CPT | Performed by: FAMILY MEDICINE

## 2021-01-25 PROCEDURE — G8417 CALC BMI ABV UP PARAM F/U: HCPCS | Performed by: FAMILY MEDICINE

## 2021-01-25 PROCEDURE — G8427 DOCREV CUR MEDS BY ELIG CLIN: HCPCS | Performed by: FAMILY MEDICINE

## 2021-01-25 PROCEDURE — 83036 HEMOGLOBIN GLYCOSYLATED A1C: CPT | Performed by: FAMILY MEDICINE

## 2021-01-25 RX ORDER — INSULIN GLARGINE 100 [IU]/ML
44 INJECTION, SOLUTION SUBCUTANEOUS NIGHTLY
Qty: 15 ML | Refills: 2 | Status: SHIPPED | OUTPATIENT
Start: 2021-01-25 | End: 2021-01-25 | Stop reason: SDUPTHER

## 2021-01-25 RX ORDER — INSULIN GLARGINE 100 [IU]/ML
50 INJECTION, SOLUTION SUBCUTANEOUS NIGHTLY
Qty: 15 ML | Refills: 2 | Status: SHIPPED | OUTPATIENT
Start: 2021-01-25 | End: 2021-01-25

## 2021-01-25 RX ORDER — HYDROCODONE BITARTRATE AND ACETAMINOPHEN 10; 325 MG/1; MG/1
1 TABLET ORAL EVERY 6 HOURS PRN
COMMUNITY

## 2021-01-25 RX ORDER — INFLIXIMAB 100 MG/10ML
INJECTION, POWDER, LYOPHILIZED, FOR SOLUTION INTRAVENOUS
COMMUNITY
End: 2021-11-17 | Stop reason: SDUPTHER

## 2021-01-25 RX ORDER — INSULIN GLARGINE 100 [IU]/ML
50 INJECTION, SOLUTION SUBCUTANEOUS NIGHTLY
Qty: 15 ML | Refills: 2 | Status: SHIPPED | OUTPATIENT
Start: 2021-01-25 | End: 2021-06-21 | Stop reason: SDUPTHER

## 2021-01-25 ASSESSMENT — ENCOUNTER SYMPTOMS
VOMITING: 0
RHINORRHEA: 0
COUGH: 0
NAUSEA: 0
DIARRHEA: 0
SINUS PRESSURE: 0
SORE THROAT: 0
ABDOMINAL PAIN: 0
SHORTNESS OF BREATH: 0

## 2021-01-25 ASSESSMENT — PATIENT HEALTH QUESTIONNAIRE - PHQ9: DEPRESSION UNABLE TO ASSESS: PT REFUSES

## 2021-01-25 NOTE — PROGRESS NOTES
2021     Jasbir Tavarez (:  1955) is a 60 y.o. male, here for evaluation of the following medical concerns:    HPI     1.  DM II-  reason for visit today, Takes lantus 44 at night, patient has A1C that was 10.4 He hasn't been monitor glucose at home, not sure what else he can do? Seems frustrated, also taking glipizide 5 mg daily,  Metformin 500 BID, needs eye exam and foot exam today. 2.   CAD-  NSTEMI- 2017, follows with Dr. Katelynn Tsang, last visit was in , Hospitalized from 2017-8/15/2017 with NSTEMI. [facility] underwent cardiac cath which resulted in SHAN x2 to RCA. LVEF 35% initially and follow up echo showing LVEF 55-60% (done after PCI).       3. Crohn's- still following with GI, needs colonoscopy and will defer to his GI doctor. Stated they have spoken concerning this and have a plan to revisit the test this year.      4. Hyperlipidemia- taking medication as prescribed, feels this is well controlled, last LDL is 27, HDL is 48, no side effects from the medication.      Today, denied chest pain, sob, n, v, or diarrhea. Review of Systems   Constitutional: Negative for activity change, fatigue, fever and unexpected weight change. HENT: Negative for congestion, ear pain, rhinorrhea, sinus pressure and sore throat. Respiratory: Negative for cough and shortness of breath. Cardiovascular: Negative for chest pain, palpitations and leg swelling. Gastrointestinal: Negative for abdominal pain, diarrhea, nausea and vomiting. Endocrine: Negative for cold intolerance, heat intolerance, polydipsia and polyphagia. Genitourinary: Negative for testicular pain. Musculoskeletal: Negative for arthralgias. Neurological: Negative for dizziness, syncope, light-headedness and headaches. Psychiatric/Behavioral: Negative for dysphoric mood. The patient is not nervous/anxious. Prior to Visit Medications    Medication Sig Taking?  Authorizing Provider inFLIXimab (REMICADE) 100 MG injection Infuse intravenously Yes Historical Provider, MD   HYDROcodone-acetaminophen (NORCO)  MG per tablet Take 1 tablet by mouth every 6 hours as needed.  Yes Historical Provider, MD   insulin glargine (LANTUS SOLOSTAR) 100 UNIT/ML injection pen Inject 50 Units into the skin nightly Yes Edwin Bond, DO   metFORMIN (GLUCOPHAGE) 500 MG tablet Take 1 tablet by mouth 2 times daily (with meals) Yes Edwin Bond, DO   nitroGLYCERIN (NITROSTAT) 0.4 MG SL tablet Place 1 tablet under the tongue every 5 minutes as needed for Chest pain Yes Edwin Bond, DO   mesalamine (PENTASA) 500 MG extended release capsule Take 2 capsules by mouth 2 times daily Yes Kwesi Alvarez MD   glipiZIDE (GLUCOTROL XL) 5 MG extended release tablet Take 1 tablet by mouth daily Yes Kwesi Alvarez MD   metoprolol succinate (TOPROL XL) 25 MG extended release tablet Take 0.5 tablets by mouth nightly Yes Nick Maravilla MD   furosemide (LASIX) 20 MG tablet TAKE 1 TABLET DAILY Yes Kwesi Alvarez MD   atorvastatin (LIPITOR) 40 MG tablet Take 1 tablet by mouth nightly Yes Kwesi Alvarez MD   aspirin 81 MG chewable tablet Take 1 tablet by mouth daily Yes Janie Robbins MD   Insulin Pen Needle (PEN NEEDLES) 32G X 4 MM MISC 1 Container by Does not apply route every morning (before breakfast)  Abida Leavitt, DO   Insulin Pen Needle (B-D UF III MINI PEN NEEDLES) 31G X 5 MM MISC 4 times a day  Kwesi Alvarez MD   glucose (GLUTOSE) 40 % GEL Take 37.5 mLs by mouth as needed (low blood sugar (less than 70))  Clary Edmond MD   inFLIXimab (REMICADE) 100 MG injection Infuse 5 mg/kg intravenously See Admin Instructions q 6-8 wks  Historical Provider, MD   FREESTYLE LANCETS MISC 1 each by Does not apply route daily  Kwesi Alvarez MD   glucose monitoring kit (FREESTYLE) monitoring kit 1 kit by Does not apply route daily as needed (BS checks)  Janie Robbins MD glucose blood VI test strips (FREESTYLE TEST STRIPS) strip 1 each by In Vitro route daily As needed. Zunilda Estrella MD        Social History     Tobacco Use    Smoking status: Former Smoker     Packs/day: 1.50     Years: 10.00     Pack years: 15.00     Types: Cigarettes     Quit date: 8/12/2017     Years since quitting: 3.4    Smokeless tobacco: Former User     Quit date: 8/12/2017   Substance Use Topics    Alcohol use: Not Currently     Comment: rarely        Vitals:    01/25/21 1600   BP: 134/82   Site: Left Upper Arm   Temp: 97.3 °F (36.3 °C)   TempSrc: Infrared   Weight: 265 lb (120.2 kg)   Height: 6' 2\" (1.88 m)     Estimated body mass index is 34.02 kg/m² as calculated from the following:    Height as of this encounter: 6' 2\" (1.88 m). Weight as of this encounter: 265 lb (120.2 kg). Physical Exam  Vitals signs and nursing note reviewed. Constitutional:       Appearance: He is well-developed. HENT:      Head: Normocephalic and atraumatic. Right Ear: External ear normal.      Left Ear: External ear normal.   Neck:      Thyroid: No thyromegaly. Cardiovascular:      Rate and Rhythm: Normal rate and regular rhythm. Heart sounds: No murmur. Pulmonary:      Effort: Pulmonary effort is normal.      Breath sounds: Normal breath sounds. No wheezing or rales. Abdominal:      General: Bowel sounds are normal.      Palpations: Abdomen is soft. Tenderness: There is no abdominal tenderness. Skin:     Findings: No rash. Neurological:      Mental Status: He is alert and oriented to person, place, and time. Psychiatric:         Behavior: Behavior normal.         ASSESSMENT/PLAN:  1. Type 2 diabetes mellitus without complication, without long-term current use of insulin ([address]  Patient will need to keep a log of his glucose readings and bring them to the office. Discussed how to check glucose and titrate Lantus dose based on this. Will increase Lantus to 46 units tonight. He needs to monitor his diet and exercise. Attempt to lose weight. Discussed proper eating habits. Continue to take medication as prescribed. Educated on signs and symptoms for immediate evaluation in the ER.   - POCT glycosylated hemoglobin (Hb A1C)  - Amb External Referral To Ophthalmology  - insulin glargine (LANTUS SOLOSTAR) 100 UNIT/ML injection pen; Inject 50 Units into the skin nightly  Dispense: 15 mL; Refill: 2    2. Mixed hyperlipidemia  Take medication as prescribed. Discussed the need to exercise 30 minutes each day. Monitor diet, avoid fried foods etc... Educated on need to reduce cholesterol in diet and results of poor diet. 3. NSTEMI (non-ST elevated myocardial infarction) ([facility]  Stable  Continue with medication  Keep appointments with specialist.   Christina Figueroa questions    4. Crohn's disease without complication, unspecified gastrointestinal tract location ([facility]  Stable  Continue with medication  Keep appointments with specialist.   Christina Figueroa questions      No follow-ups on file.

## 2021-01-27 ENCOUNTER — TELEPHONE (OUTPATIENT)
Dept: FAMILY MEDICINE CLINIC | Age: 66
End: 2021-01-27

## 2021-01-27 NOTE — TELEPHONE ENCOUNTER
Rere from [facility] pharmacy is calling to clarify the lantus Rx she states its written for 44 units and 40 units she wasn't sure which one he wanted does he want 44 units daily or nightly please call back pharmacy to clarify

## 2021-03-18 ENCOUNTER — TELEPHONE (OUTPATIENT)
Dept: FAMILY MEDICINE CLINIC | Age: 66
End: 2021-03-18

## 2021-03-18 DIAGNOSIS — I25.5 ISCHEMIC CARDIOMYOPATHY: ICD-10-CM

## 2021-03-18 RX ORDER — PEN NEEDLE, DIABETIC 30 GX3/16"
1 NEEDLE, DISPOSABLE MISCELLANEOUS
Qty: 200 EACH | Refills: 0 | Status: SHIPPED | OUTPATIENT
Start: 2021-03-18 | End: 2021-11-17

## 2021-03-18 RX ORDER — FUROSEMIDE 20 MG/1
TABLET ORAL
Qty: 90 TABLET | Refills: 4 | Status: SHIPPED | OUTPATIENT
Start: 2021-03-18 | End: 2022-03-24

## 2021-04-16 ENCOUNTER — IMMUNIZATION (OUTPATIENT)
Dept: PRIMARY CARE CLINIC | Age: 66
End: 2021-04-16
Payer: MEDICARE

## 2021-04-16 PROCEDURE — 0011A COVID-19, MODERNA VACCINE 100MCG/0.5ML DOSE: CPT | Performed by: FAMILY MEDICINE

## 2021-04-16 PROCEDURE — 91301 COVID-19, MODERNA VACCINE 100MCG/0.5ML DOSE: CPT | Performed by: FAMILY MEDICINE

## 2021-05-07 DIAGNOSIS — I25.10 CORONARY ARTERY DISEASE INVOLVING NATIVE HEART WITHOUT ANGINA PECTORIS, UNSPECIFIED VESSEL OR LESION TYPE: ICD-10-CM

## 2021-05-07 DIAGNOSIS — E78.5 HYPERLIPIDEMIA, UNSPECIFIED HYPERLIPIDEMIA TYPE: ICD-10-CM

## 2021-05-07 RX ORDER — ATORVASTATIN CALCIUM 40 MG/1
40 TABLET, FILM COATED ORAL NIGHTLY
Qty: 90 TABLET | Refills: 3 | Status: SHIPPED | OUTPATIENT
Start: 2021-05-07 | End: 2022-04-25

## 2021-05-11 DIAGNOSIS — I25.10 CORONARY ARTERY DISEASE INVOLVING NATIVE HEART WITHOUT ANGINA PECTORIS, UNSPECIFIED VESSEL OR LESION TYPE: ICD-10-CM

## 2021-05-11 RX ORDER — METOPROLOL SUCCINATE 25 MG/1
12.5 TABLET, EXTENDED RELEASE ORAL NIGHTLY
Qty: 90 TABLET | Refills: 3 | Status: SHIPPED | OUTPATIENT
Start: 2021-05-11 | End: 2022-06-21 | Stop reason: SDUPTHER

## 2021-05-11 RX ORDER — GLIPIZIDE 5 MG/1
5 TABLET, FILM COATED, EXTENDED RELEASE ORAL DAILY
Qty: 90 TABLET | Refills: 3 | Status: SHIPPED | OUTPATIENT
Start: 2021-05-11 | End: 2022-04-25

## 2021-05-14 ENCOUNTER — IMMUNIZATION (OUTPATIENT)
Dept: PRIMARY CARE CLINIC | Age: 66
End: 2021-05-14
Payer: MEDICARE

## 2021-05-14 PROCEDURE — 91301 COVID-19, MODERNA VACCINE 100MCG/0.5ML DOSE: CPT

## 2021-05-14 PROCEDURE — 0012A COVID-19, MODERNA VACCINE 100MCG/0.5ML DOSE: CPT

## 2021-06-18 RX ORDER — MESALAMINE 500 MG/1
1000 CAPSULE, EXTENDED RELEASE ORAL 2 TIMES DAILY
Qty: 120 CAPSULE | Refills: 3 | Status: SHIPPED | OUTPATIENT
Start: 2021-06-18 | End: 2021-10-05

## 2021-06-21 DIAGNOSIS — E11.9 TYPE 2 DIABETES MELLITUS WITHOUT COMPLICATION, WITHOUT LONG-TERM CURRENT USE OF INSULIN (HCC): ICD-10-CM

## 2021-06-21 RX ORDER — INSULIN GLARGINE 100 [IU]/ML
50 INJECTION, SOLUTION SUBCUTANEOUS NIGHTLY
Qty: 15 ML | Refills: 2 | Status: SHIPPED | OUTPATIENT
Start: 2021-06-21 | End: 2021-12-16

## 2021-08-03 ENCOUNTER — OFFICE VISIT (OUTPATIENT)
Dept: FAMILY MEDICINE CLINIC | Age: 66
End: 2021-08-03
Payer: MEDICARE

## 2021-08-03 VITALS
OXYGEN SATURATION: 98 % | WEIGHT: 263.6 LBS | HEART RATE: 76 BPM | DIASTOLIC BLOOD PRESSURE: 68 MMHG | BODY MASS INDEX: 33.84 KG/M2 | SYSTOLIC BLOOD PRESSURE: 128 MMHG

## 2021-08-03 DIAGNOSIS — I71.21 ASCENDING AORTIC ANEURYSM: ICD-10-CM

## 2021-08-03 DIAGNOSIS — E11.65 UNCONTROLLED TYPE 2 DIABETES MELLITUS WITH HYPERGLYCEMIA (HCC): ICD-10-CM

## 2021-08-03 DIAGNOSIS — Z12.5 SCREENING PSA (PROSTATE SPECIFIC ANTIGEN): ICD-10-CM

## 2021-08-03 DIAGNOSIS — K50.90 CROHN'S DISEASE WITHOUT COMPLICATION, UNSPECIFIED GASTROINTESTINAL TRACT LOCATION (HCC): Primary | ICD-10-CM

## 2021-08-03 PROBLEM — D68.9 COAGULATION DEFECT (HCC): Status: ACTIVE | Noted: 2021-08-03

## 2021-08-03 PROBLEM — D68.9 COAGULATION DEFECT (HCC): Status: RESOLVED | Noted: 2021-08-03 | Resolved: 2021-08-03

## 2021-08-03 LAB
A/G RATIO: 1.2 (ref 1.1–2.2)
ALBUMIN SERPL-MCNC: 4.4 G/DL (ref 3.4–5)
ALP BLD-CCNC: 105 U/L (ref 40–129)
ALT SERPL-CCNC: 30 U/L (ref 10–40)
ANION GAP SERPL CALCULATED.3IONS-SCNC: 12 MMOL/L (ref 3–16)
AST SERPL-CCNC: 26 U/L (ref 15–37)
BILIRUB SERPL-MCNC: 0.7 MG/DL (ref 0–1)
BUN BLDV-MCNC: 18 MG/DL (ref 7–20)
CALCIUM SERPL-MCNC: 10 MG/DL (ref 8.3–10.6)
CHLORIDE BLD-SCNC: 100 MMOL/L (ref 99–110)
CHOLESTEROL, TOTAL: 126 MG/DL (ref 0–199)
CO2: 27 MMOL/L (ref 21–32)
CREAT SERPL-MCNC: 1 MG/DL (ref 0.8–1.3)
CREATININE URINE POCT: 100
GFR AFRICAN AMERICAN: >60
GFR NON-AFRICAN AMERICAN: >60
GLOBULIN: 3.7 G/DL
GLUCOSE BLD-MCNC: 276 MG/DL (ref 70–99)
HCT VFR BLD CALC: 46.8 % (ref 40.5–52.5)
HDLC SERPL-MCNC: 47 MG/DL (ref 40–60)
HEMOGLOBIN: 15.7 G/DL (ref 13.5–17.5)
LDL CHOLESTEROL CALCULATED: 28 MG/DL
MCH RBC QN AUTO: 28.5 PG (ref 26–34)
MCHC RBC AUTO-ENTMCNC: 33.5 G/DL (ref 31–36)
MCV RBC AUTO: 85.1 FL (ref 80–100)
MICROALBUMIN/CREAT 24H UR: 30 MG/G{CREAT}
MICROALBUMIN/CREAT UR-RTO: 30
PDW BLD-RTO: 14.4 % (ref 12.4–15.4)
PLATELET # BLD: 152 K/UL (ref 135–450)
PMV BLD AUTO: 9.1 FL (ref 5–10.5)
POTASSIUM SERPL-SCNC: 4.6 MMOL/L (ref 3.5–5.1)
PROSTATE SPECIFIC ANTIGEN: 0.21 NG/ML (ref 0–4)
RBC # BLD: 5.5 M/UL (ref 4.2–5.9)
SODIUM BLD-SCNC: 139 MMOL/L (ref 136–145)
TOTAL PROTEIN: 8.1 G/DL (ref 6.4–8.2)
TRIGL SERPL-MCNC: 256 MG/DL (ref 0–150)
VLDLC SERPL CALC-MCNC: 51 MG/DL
WBC # BLD: 6.1 K/UL (ref 4–11)

## 2021-08-03 PROCEDURE — 3017F COLORECTAL CA SCREEN DOC REV: CPT | Performed by: FAMILY MEDICINE

## 2021-08-03 PROCEDURE — 99214 OFFICE O/P EST MOD 30 MIN: CPT | Performed by: FAMILY MEDICINE

## 2021-08-03 PROCEDURE — 1036F TOBACCO NON-USER: CPT | Performed by: FAMILY MEDICINE

## 2021-08-03 PROCEDURE — 3046F HEMOGLOBIN A1C LEVEL >9.0%: CPT | Performed by: FAMILY MEDICINE

## 2021-08-03 PROCEDURE — 4040F PNEUMOC VAC/ADMIN/RCVD: CPT | Performed by: FAMILY MEDICINE

## 2021-08-03 PROCEDURE — 82044 UR ALBUMIN SEMIQUANTITATIVE: CPT | Performed by: FAMILY MEDICINE

## 2021-08-03 PROCEDURE — 36415 COLL VENOUS BLD VENIPUNCTURE: CPT | Performed by: FAMILY MEDICINE

## 2021-08-03 PROCEDURE — G8427 DOCREV CUR MEDS BY ELIG CLIN: HCPCS | Performed by: FAMILY MEDICINE

## 2021-08-03 PROCEDURE — G8417 CALC BMI ABV UP PARAM F/U: HCPCS | Performed by: FAMILY MEDICINE

## 2021-08-03 PROCEDURE — 2022F DILAT RTA XM EVC RTNOPTHY: CPT | Performed by: FAMILY MEDICINE

## 2021-08-03 PROCEDURE — 1123F ACP DISCUSS/DSCN MKR DOCD: CPT | Performed by: FAMILY MEDICINE

## 2021-08-03 SDOH — ECONOMIC STABILITY: FOOD INSECURITY: WITHIN THE PAST 12 MONTHS, THE FOOD YOU BOUGHT JUST DIDN'T LAST AND YOU DIDN'T HAVE MONEY TO GET MORE.: NEVER TRUE

## 2021-08-03 SDOH — ECONOMIC STABILITY: FOOD INSECURITY: WITHIN THE PAST 12 MONTHS, YOU WORRIED THAT YOUR FOOD WOULD RUN OUT BEFORE YOU GOT MONEY TO BUY MORE.: NEVER TRUE

## 2021-08-03 ASSESSMENT — PATIENT HEALTH QUESTIONNAIRE - PHQ9
SUM OF ALL RESPONSES TO PHQ QUESTIONS 1-9: 0
2. FEELING DOWN, DEPRESSED OR HOPELESS: 0
1. LITTLE INTEREST OR PLEASURE IN DOING THINGS: 0
SUM OF ALL RESPONSES TO PHQ QUESTIONS 1-9: 0
SUM OF ALL RESPONSES TO PHQ QUESTIONS 1-9: 0
SUM OF ALL RESPONSES TO PHQ9 QUESTIONS 1 & 2: 0

## 2021-08-03 ASSESSMENT — SOCIAL DETERMINANTS OF HEALTH (SDOH): HOW HARD IS IT FOR YOU TO PAY FOR THE VERY BASICS LIKE FOOD, HOUSING, MEDICAL CARE, AND HEATING?: NOT VERY HARD

## 2021-08-03 NOTE — PROGRESS NOTES
8/3/2021     Voncille Bernheim (:  1955) is a 73 y.o. male, here for evaluation of the following medical concerns:    HPI       1.  DM II-  reason for visit today, Takes lantus 55 at night, patient has A1C that was 10.4  down to 10.3He hasn't been monitor glucose at home, not sure what else he can do? Seems frustrated, also taking glipizide 5 mg daily,  Metformin 500 BID, needs eye exam and foot exam today.      2.   CAD-  NSTEMI- 2017, follows with Dr. Raciel Palacio, last visit was in , Hospitalized from 2017-8/15/2017 with NSTEMI. [facility] underwent cardiac cath which resulted in SHAN x2 to RCA. LVEF 35% initially and follow up echo showing LVEF 55-60% (done after PCI).        3.  Crohn's- still following with GI, needs colonoscopy and will defer to his GI doctor. Stated they have spoken concerning this and have a plan to revisit the test this year. Will follow up with GI for colonoscopy.     4. Hyperlipidemia- taking medication as prescribed, feels this is well controlled, last LDL is 27, HDL is 48, no side effects from the medication.      Today, denied chest pain, sob, n, v, or diarrhea.      Review of Systems   Constitutional: Negative for activity change, fatigue, fever and unexpected weight change. HENT: Negative for congestion, ear pain, sinus pressure and sore throat. Eyes: Negative for visual disturbance. Respiratory: Negative for cough, chest tightness, shortness of breath and wheezing. Cardiovascular: Negative for chest pain, palpitations and leg swelling. Gastrointestinal: Negative for abdominal pain, diarrhea, nausea and vomiting. Endocrine: Negative for cold intolerance, heat intolerance, polydipsia and polyphagia. Musculoskeletal: Negative for arthralgias. Skin: Negative for rash. Psychiatric/Behavioral: Negative for dysphoric mood. The patient is not nervous/anxious. Prior to Visit Medications    Medication Sig Taking?  Authorizing Provider   insulin glargine (LANTUS SOLOSTAR) 100 UNIT/ML injection pen Inject 50 Units into the skin nightly Yes Edwin Bond DO   mesalamine (PENTASA) 500 MG extended release capsule Take 2 capsules by mouth 2 times daily Yes Edwin Bond, DO   glipiZIDE (GLUCOTROL XL) 5 MG extended release tablet Take 1 tablet by mouth daily Yes Edwni Bond, DO   metoprolol succinate (TOPROL XL) 25 MG extended release tablet Take 0.5 tablets by mouth nightly Yes Edwin Bond, DO   atorvastatin (LIPITOR) 40 MG tablet Take 1 tablet by mouth nightly Yes Edwin Bond, DO   Insulin Pen Needle (PEN NEEDLES) 32G X 4 MM MISC 1 Container by Does not apply route every morning (before breakfast) Yes Edwin Bond, DO   furosemide (LASIX) 20 MG tablet TAKE 1 TABLET DAILY Yes Edwin Bond, DO   metFORMIN (GLUCOPHAGE) 500 MG tablet TAKE 1 TABLET BY MOUTH  TWICE DAILY WITH MEALS Yes Edwin Bond DO   inFLIXimab (REMICADE) 100 MG injection Infuse intravenously Yes Historical Provider, MD   HYDROcodone-acetaminophen (NORCO)  MG per tablet Take 1 tablet by mouth every 6 hours as needed.  Yes Historical Provider, MD   nitroGLYCERIN (NITROSTAT) 0.4 MG SL tablet Place 1 tablet under the tongue every 5 minutes as needed for Chest pain Yes Edwin Bond DO   Insulin Pen Needle (B-D UF III MINI PEN NEEDLES) 31G X 5 MM MISC 4 times a day Yes Jose Bean MD   glucose (GLUTOSE) 40 % GEL Take 37.5 mLs by mouth as needed (low blood sugar (less than 70)) Yes Bhavana Lynch MD   inFLIXimab (REMICADE) 100 MG injection Infuse 5 mg/kg intravenously See Admin Instructions q 6-8 wks Yes Historical Provider, MD   FREESTYLE LANCETS MISC 1 each by Does not apply route daily Yes Jose Bean MD   aspirin 81 MG chewable tablet Take 1 tablet by mouth daily Yes Natalio Fontaine MD   glucose monitoring kit (FREESTYLE) monitoring kit 1 kit by Does not apply route daily as needed (BS checks) Yes Natalio Fontaine MD   glucose blood VI test strips (FREESTYLE TEST STRIPS) strip 1 each by In Vitro route daily As needed. Yes Jarret Mendenhall MD        Social History     Tobacco Use    Smoking status: Former Smoker     Packs/day: 1.50     Years: 10.00     Pack years: 15.00     Types: Cigarettes     Quit date: 2017     Years since quittin.0    Smokeless tobacco: Former User     Quit date: 2017   Substance Use Topics    Alcohol use: Not Currently     Comment: rarely        Vitals:    21 1355   BP: 128/68   Site: Left Upper Arm   Position: Sitting   Cuff Size: Large Adult   Pulse: 76   SpO2: 98%   Weight: 263 lb 9.6 oz (119.6 kg)     Estimated body mass index is 33.84 kg/m² as calculated from the following:    Height as of 21: 6' 2\" (1.88 m). Weight as of this encounter: 263 lb 9.6 oz (119.6 kg). Physical Exam  Vitals and nursing note reviewed. Constitutional:       Appearance: He is well-developed. HENT:      Head: Normocephalic and atraumatic. Right Ear: External ear normal.      Left Ear: External ear normal.   Eyes:      Pupils: Pupils are equal, round, and reactive to light. Neck:      Thyroid: No thyromegaly. Cardiovascular:      Rate and Rhythm: Normal rate and regular rhythm. Heart sounds: No murmur heard. Pulmonary:      Effort: Pulmonary effort is normal.      Breath sounds: Normal breath sounds. No wheezing or rales. Abdominal:      General: Bowel sounds are normal.      Palpations: Abdomen is soft. Tenderness: There is no abdominal tenderness. Musculoskeletal:         General: Normal range of motion. Cervical back: Neck supple. Lymphadenopathy:      Cervical: No cervical adenopathy. Skin:     Findings: No rash. Neurological:      Mental Status: He is alert and oriented to person, place, and time. Mental status is at baseline. Psychiatric:         Behavior: Behavior normal.         Judgment: Judgment normal.         ASSESSMENT/PLAN:  1.  Crohn's disease without complication, unspecified gastrointestinal tract location ([place] [address].)  Stable  Continue with medication  Keep appointments with specialist.   Matt Holm questions  - CBC  - Comprehensive Metabolic Panel  - Hemoglobin A1C  - Lipid Panel  - POCT microalbumin  - Amb External Referral To Ophthalmology    2. Uncontrolled type 2 diabetes mellitus with hyperglycemia ([place] [address].)  Patient will need to keep a log of his glucose readings and bring them to the office. He needs to monitor his diet and exercise. Attempt to lose weight. Discussed proper eating habits. Continue to take medication as prescribed. Will obtain A1C at this visit. Educated on signs and symptoms for immediate evaluation in the ER.   - CBC  - Comprehensive Metabolic Panel  - Hemoglobin A1C  - Lipid Panel  - POCT microalbumin  - Amb External Referral To Ophthalmology    3. Ascending aortic aneurysm ([facility]  Referred for procedure  Educated on the importance of having this done. Answered questions  - CBC  - Comprehensive Metabolic Panel  - Hemoglobin A1C  - Lipid Panel  - POCT microalbumin  - Amb External Referral To Ophthalmology    4. Screening PSA (prostate specific antigen)  Referred for procedure  Educated on the importance of having this done. Answered questions  - PSA screening      No follow-ups on file.

## 2021-08-04 LAB
ESTIMATED AVERAGE GLUCOSE: 248.9 MG/DL
HBA1C MFR BLD: 10.3 %

## 2021-08-11 ASSESSMENT — ENCOUNTER SYMPTOMS
DIARRHEA: 0
SHORTNESS OF BREATH: 0
CHEST TIGHTNESS: 0
NAUSEA: 0
SORE THROAT: 0
WHEEZING: 0
COUGH: 0
VOMITING: 0
ABDOMINAL PAIN: 0
SINUS PRESSURE: 0

## 2021-08-17 ENCOUNTER — TELEPHONE (OUTPATIENT)
Dept: FAMILY MEDICINE CLINIC | Age: 66
End: 2021-08-17

## 2021-08-17 NOTE — TELEPHONE ENCOUNTER
----- Message from Florina Kim sent at 8/17/2021 11:22 AM EDT -----  Subject: Message to Provider    QUESTIONS  Information for Provider? Pts wife called and said she wants to let the   doctor know that the pt has an appointment with [facility]   and she wanted to give the doctor the date and the time of the appointment   which is March 2nd,2022 at 1pm.   ---------------------------------------------------------------------------  --------------  CALL BACK INFO  What is the best way for the office to contact you? OK to leave message on   voicemail  Preferred Call Back Phone Number? [phone]  ---------------------------------------------------------------------------  --------------  SCRIPT ANSWERS  Relationship to Patient? Other  Representative Name? Dane Anthony  Is the Representative on the appropriate HIPAA document in Epic?  Yes

## 2021-10-12 ENCOUNTER — TELEPHONE (OUTPATIENT)
Dept: FAMILY MEDICINE CLINIC | Age: 66
End: 2021-10-12

## 2021-10-12 RX ORDER — MESALAMINE 500 MG/1
CAPSULE, EXTENDED RELEASE ORAL
Qty: 360 CAPSULE | Refills: 1 | Status: SHIPPED | OUTPATIENT
Start: 2021-10-12 | End: 2022-04-04

## 2021-10-12 NOTE — TELEPHONE ENCOUNTER
Pt wife called to ask Dr Latisha Stevens if he can send a new rx for the Pentasa 500 mg for a 90 day supply to [facility]. The quantity is 360. It is cheaper for the pt to do a 90 day supply instead of a 30 day. Please resend rx.  Please give pt a call when complete [phone]

## 2021-10-13 RX ORDER — MESALAMINE 500 MG/1
CAPSULE, EXTENDED RELEASE ORAL
Qty: 360 CAPSULE | Refills: 1 | OUTPATIENT
Start: 2021-10-13

## 2021-11-10 ENCOUNTER — HOSPITAL ENCOUNTER (OUTPATIENT)
Dept: CT IMAGING | Age: 66
Discharge: HOME | End: 2021-11-10
Payer: MEDICARE

## 2021-11-10 DIAGNOSIS — I71.21 ASCENDING AORTIC ANEURYSM: ICD-10-CM

## 2021-11-10 PROCEDURE — 6360000004 HC RX CONTRAST MEDICATION: Performed by: INTERNAL MEDICINE

## 2021-11-10 PROCEDURE — 71260 CT THORAX DX C+: CPT

## 2021-11-10 RX ADMIN — IOPAMIDOL 75 ML: 755 INJECTION, SOLUTION INTRAVENOUS at 11:53

## 2021-11-15 NOTE — PROGRESS NOTES
[facility]  Office Visit    Dillon Holloway  1955    CC: \"I get worn out\"     HPI: The patient is 73 y.o. male with a past medical history significant for chronic diastolic dysfunction, tobacco use, AAA, DM, CAD and Crohn's with prior resection here for follow up. Hospitalized from 2017-8/15/2017 with NSTEMI. He underwent cardiac cath which resulted in SHAN x2 to RCA. LVEF 35% initially and follow up echo showing LVEF 55-60% (done after PCI). He presents today for follow up. He reports some days he will feel \"worn out\" after minimal exertion. Other days he denies any limitations to his daily activities. He has not been participating in any regular exercise and attributes this to gym closure due to [place]. He has not been participating in exercise in at least a year. He reports occasional chest pressure that will present with rest. This will resolve after a few minutes. He admits to more frequent indigestion since he was last seen in the office. He was a previous smoker and quit 2017. Review of Systems:  Constitutional: Denies weakness, night sweats or fever. HEENT: Denies new visual changes, ringing in ears, nosebleeds, nasal congestion  Respiratory: Improved SOB   Cardiovascular: see HPI  GI: Denies N/V, diarrhea, constipation, abdominal pain. + fistula  : Denies urinary frequency, urgency, incontinence, hematuria or dysuria. Skin: Denies rash, hives, or cyanosis  Musculoskeletal:chronic joint pain  Neurological: Denies syncope or TIA-like symptoms.   Psychiatric: Denies anxiety, insomnia or depression     Social History     Tobacco Use    Smoking status: Former Smoker     Packs/day: 1.50     Years: 10.00     Pack years: 15.00     Types: Cigarettes     Quit date: 2017     Years since quittin.2    Smokeless tobacco: Former User     Quit date: 2017   Vaping Use    Vaping Use: Never used   Substance Use Topics    Alcohol use: Not Currently     Comment: rarely    Drug use: No       Allergies   Allergen Reactions    Codeine Other (See Comments)     Pt unsure of exact reaction  Other reaction(s): GI Upset  Pt unsure of exact reaction    Oxycodone-Acetaminophen Other (See Comments)     Pt \"doesn't feel right\" when taking. Other reaction(s): GI Upset  Pt \"doesn't feel right\" when taking.  Percocet [Oxycodone-Acetaminophen]      Pt \"doesn't feel right\" when taking.  Oxycodone Nausea And Vomiting       Current Outpatient Medications   Medication Sig Dispense Refill    mesalamine (PENTASA) 500 MG extended release capsule TAKE 2 CAPSULES BY MOUTH  TWICE DAILY 360 capsule 1    insulin glargine (LANTUS SOLOSTAR) 100 UNIT/ML injection pen Inject 50 Units into the skin nightly 15 mL 2    glipiZIDE (GLUCOTROL XL) 5 MG extended release tablet Take 1 tablet by mouth daily 90 tablet 3    metoprolol succinate (TOPROL XL) 25 MG extended release tablet Take 0.5 tablets by mouth nightly 90 tablet 3    atorvastatin (LIPITOR) 40 MG tablet Take 1 tablet by mouth nightly 90 tablet 3    furosemide (LASIX) 20 MG tablet TAKE 1 TABLET DAILY 90 tablet 4    metFORMIN (GLUCOPHAGE) 500 MG tablet TAKE 1 TABLET BY MOUTH  TWICE DAILY WITH MEALS 180 tablet 3    HYDROcodone-acetaminophen (NORCO)  MG per tablet Take 1 tablet by mouth every 6 hours as needed.  nitroGLYCERIN (NITROSTAT) 0.4 MG SL tablet Place 1 tablet under the tongue every 5 minutes as needed for Chest pain 25 tablet 3    glucose (GLUTOSE) 40 % GEL Take 37.5 mLs by mouth as needed (low blood sugar (less than 70)) 45 g 1    inFLIXimab (REMICADE) 100 MG injection Infuse 5 mg/kg intravenously See Admin Instructions q 6-8 wks      aspirin 81 MG chewable tablet Take 1 tablet by mouth daily 30 tablet 3     No current facility-administered medications for this visit.        Physical Exam:   /68   Pulse 74   Ht 6' 2\" (1.88 m)   Wt 264 lb (119.7 kg)   SpO2 95%   BMI 33.90 kg/m²   Wt Readings from Last 2 Encounters:   11/17/21 264 lb (119.7 kg)   08/03/21 263 lb 9.6 oz (119.6 kg)     Constitutional: He is oriented to person, place, and time. He appears well-developed and well-nourished. Voicing several complaints and frustration. HEENT: Normocephalic and atraumatic. Sclerae anicteric. No xanthelasmas. EOM's intact. Neck: Neck supple. No JVD present. Carotids without bruits. No thyromegaly present. No lymphadenopathy present. Cardiovascular: RRR, normal S1 and S2; no murmur/gallop or rub, PMI nondisplaced  Pulmonary/Chest: Effort normal and breath sounds normal.  Lungs with mildly diminished breath sounds bilaterally. Chest wall nontender  Abdominal: soft, nontender, nondistended. + bowel sounds; no hepatomegaly  Extremities: No edema, cyanosis, or clubbing. Pulses are 2+ radial/DP/PT bilaterally. Cap refill brisk. Neurological: No focal deficit. Skin: Skin is warm and dry. Psychiatric: He has a normal mood and affect.  His speech is normal and behavior is normal.     Lab Review:   Lab Results   Component Value Date    TRIG 256 08/03/2021    HDL 47 08/03/2021    LDLCALC 28 08/03/2021    LDLDIRECT 76 08/13/2017    LABVLDL 51 08/03/2021     Lab Results   Component Value Date     08/03/2021    K 4.6 08/03/2021    K 3.5 10/09/2020     08/03/2021    CO2 27 08/03/2021    BUN 18 08/03/2021    CREATININE 1.0 08/03/2021    GLUCOSE 276 08/03/2021    CALCIUM 10.0 08/03/2021      Lab Results   Component Value Date    WBC 6.1 08/03/2021    HGB 15.7 08/03/2021    HCT 46.8 08/03/2021    MCV 85.1 08/03/2021     08/03/2021     Personally reviewed and interpreted   ECG 11/30/17: Normal sinus rhythm without ischemia  ECG 11/1/19: Normal sinus rhythm   ECG 11/17/21: Normal sinus rhythm       Procedures:     LHC/PCI 8/14/2017 (Dr. Lesley Millan)  -100% proximal-mid RCA stenosis stented with a 3.5 x 38 and 4.0 x 16 mm overlapping Synergy stents  -70% stenosis in a large OM and 95% stenosis in a narrow-caliber diagonal branch  -LVEF 35% with global dysfunction but more severe inferior wall dysfunction  -LVEDP of 20 post procedure      Imaging:     Echo 8/14/2017  Normal LV size and systolic function. Estimated ejection fraction is 60%. No valvular abnormalities. Normal study. CTA pulmonary 8/12/2017  No acute pulmonary embolus detected.       Mild aneurysmal dilation of the ascending aorta, 4.1 cm.  Coronary artery   calcifications are noted.       2 patchy areas of ground-glass airspace disease right lower lobe.  Pneumonia   is suspected.       8.1 mm size nodule left lower lobe along the diaphragmatic pleura.  This   could be within the lung parenchyma or involve the pleura.  Follow-up is   recommended, see below. Chest CT 4/3/18  Mild aneurysmal dilatation of the ascending aorta measuring up   to 4 cm, stable     Echo 5/13/19  Overall left ventricular systolic function is mildly depressed . Ejection fraction is visually estimated to be 45 %. There is moderate hypokinesis of the basal to apical septal walls. The mitral valve normal in structure and function. Mild mitral regurgitation is present. The aortic root is mildly dilated. 3.5 cm  The ascending aorta is mildly dilated. Chest CT 11/10/21  There is mild dilatation of the ascending aorta measuring up to 3.8 cm,   unchanged dating back to 2017. Assessment:  1. CAD involving native coronary artery of native heart with stable angina  2. Ascending aortic aneurysm ([address] 4cm  3. Hyperlipidemia with goal LDL<70 mg/dL  4. Pulmonary nodule   5. H/O Tobacco abuse    Plan:   Given his symptoms of fatigue and occasional chest pain, I will have him complete a stress perfusion study to rule out progression of obstructive coronary disease. With normal results, I would encourage him to increase his aerobic activity as tolerated.  His blood pressure is well controlled today in the office and his most recent lipid profile was favorable with his current statin therapy. His recent chest CT revealed stable aortic aneurysm that has remained unchanged since 2017. I have personally reviewed all previous testing for this visit today including imaging, lab results and EKG as detailed above. I will see him in office for follow up in 1 year or sooner pending test results. This note was scribed in the presence of Johana Lawrence MD by [employer], RN. Physician Attestation:  The scribes documentation has been prepared under my direction and personally reviewed by me in its entirety. I, Dr. Radha Kauffman personally performed the services described in this documentation as scribed by my RN in my presence, and I confirm that the note above accurately reflects all work, treatment, procedures, and medical decision making performed by me.

## 2021-11-17 ENCOUNTER — OFFICE VISIT (OUTPATIENT)
Dept: CARDIOLOGY CLINIC | Age: 66
End: 2021-11-17
Payer: MEDICARE

## 2021-11-17 VITALS
HEART RATE: 74 BPM | WEIGHT: 264 LBS | HEIGHT: 74 IN | DIASTOLIC BLOOD PRESSURE: 68 MMHG | BODY MASS INDEX: 33.88 KG/M2 | OXYGEN SATURATION: 95 % | SYSTOLIC BLOOD PRESSURE: 116 MMHG

## 2021-11-17 DIAGNOSIS — R07.9 CHEST PAIN IN ADULT: ICD-10-CM

## 2021-11-17 DIAGNOSIS — I71.21 ASCENDING AORTIC ANEURYSM: ICD-10-CM

## 2021-11-17 DIAGNOSIS — I25.119 CORONARY ARTERY DISEASE INVOLVING NATIVE CORONARY ARTERY OF NATIVE HEART WITH ANGINA PECTORIS (HCC): Primary | ICD-10-CM

## 2021-11-17 DIAGNOSIS — E78.5 HYPERLIPIDEMIA LDL GOAL <70: ICD-10-CM

## 2021-11-17 PROCEDURE — 99214 OFFICE O/P EST MOD 30 MIN: CPT | Performed by: INTERNAL MEDICINE

## 2021-11-17 PROCEDURE — 4040F PNEUMOC VAC/ADMIN/RCVD: CPT | Performed by: INTERNAL MEDICINE

## 2021-11-17 PROCEDURE — G8427 DOCREV CUR MEDS BY ELIG CLIN: HCPCS | Performed by: INTERNAL MEDICINE

## 2021-11-17 PROCEDURE — 3017F COLORECTAL CA SCREEN DOC REV: CPT | Performed by: INTERNAL MEDICINE

## 2021-11-17 PROCEDURE — 1036F TOBACCO NON-USER: CPT | Performed by: INTERNAL MEDICINE

## 2021-11-17 PROCEDURE — 93000 ELECTROCARDIOGRAM COMPLETE: CPT | Performed by: INTERNAL MEDICINE

## 2021-11-17 PROCEDURE — 1123F ACP DISCUSS/DSCN MKR DOCD: CPT | Performed by: INTERNAL MEDICINE

## 2021-11-17 PROCEDURE — G8484 FLU IMMUNIZE NO ADMIN: HCPCS | Performed by: INTERNAL MEDICINE

## 2021-11-17 PROCEDURE — G8417 CALC BMI ABV UP PARAM F/U: HCPCS | Performed by: INTERNAL MEDICINE

## 2021-11-29 ENCOUNTER — HOSPITAL ENCOUNTER (OUTPATIENT)
Dept: NON INVASIVE DIAGNOSTICS | Age: 66
Discharge: HOME | End: 2021-11-29
Payer: MEDICARE

## 2021-11-29 DIAGNOSIS — R07.9 CHEST PAIN IN ADULT: ICD-10-CM

## 2021-11-29 LAB
LV EF: 50 %
LVEF MODALITY: NORMAL

## 2021-11-29 PROCEDURE — A9502 TC99M TETROFOSMIN: HCPCS | Performed by: INTERNAL MEDICINE

## 2021-11-29 PROCEDURE — 78452 HT MUSCLE IMAGE SPECT MULT: CPT

## 2021-11-29 PROCEDURE — 3430000000 HC RX DIAGNOSTIC RADIOPHARMACEUTICAL: Performed by: INTERNAL MEDICINE

## 2021-11-29 PROCEDURE — 93017 CV STRESS TEST TRACING ONLY: CPT

## 2021-11-29 RX ADMIN — TETROFOSMIN 10 MILLICURIE: 1.38 INJECTION, POWDER, LYOPHILIZED, FOR SOLUTION INTRAVENOUS at 10:41

## 2021-11-29 RX ADMIN — TETROFOSMIN 30 MILLICURIE: 1.38 INJECTION, POWDER, LYOPHILIZED, FOR SOLUTION INTRAVENOUS at 11:52

## 2021-12-07 DIAGNOSIS — E11.9 TYPE 2 DIABETES MELLITUS WITHOUT COMPLICATION, WITHOUT LONG-TERM CURRENT USE OF INSULIN (HCC): ICD-10-CM

## 2021-12-16 DIAGNOSIS — E11.9 TYPE 2 DIABETES MELLITUS WITHOUT COMPLICATION, WITHOUT LONG-TERM CURRENT USE OF INSULIN (HCC): ICD-10-CM

## 2021-12-16 RX ORDER — INSULIN GLARGINE 100 [IU]/ML
INJECTION, SOLUTION SUBCUTANEOUS
Qty: 45 ML | Refills: 3 | Status: SHIPPED | OUTPATIENT
Start: 2021-12-16 | End: 2021-12-28 | Stop reason: SDUPTHER

## 2021-12-16 NOTE — TELEPHONE ENCOUNTER
Pt and wife justyna called back and made an appt for 12/28/21, states he is completely out of his lantus and promised to keep his appt.       Last ov 8/3/21  No future appt

## 2021-12-21 ENCOUNTER — TELEPHONE (OUTPATIENT)
Dept: ORTHOPEDIC SURGERY | Age: 66
End: 2021-12-21

## 2021-12-28 ENCOUNTER — OFFICE VISIT (OUTPATIENT)
Dept: FAMILY MEDICINE CLINIC | Age: 66
End: 2021-12-28
Payer: MEDICARE

## 2021-12-28 VITALS
DIASTOLIC BLOOD PRESSURE: 68 MMHG | BODY MASS INDEX: 34.41 KG/M2 | HEART RATE: 97 BPM | SYSTOLIC BLOOD PRESSURE: 122 MMHG | WEIGHT: 268 LBS | OXYGEN SATURATION: 97 %

## 2021-12-28 DIAGNOSIS — E11.65 UNCONTROLLED TYPE 2 DIABETES MELLITUS WITH HYPERGLYCEMIA (HCC): ICD-10-CM

## 2021-12-28 DIAGNOSIS — I71.21 ASCENDING AORTIC ANEURYSM: ICD-10-CM

## 2021-12-28 DIAGNOSIS — I25.119 CORONARY ARTERY DISEASE INVOLVING NATIVE CORONARY ARTERY OF NATIVE HEART WITH ANGINA PECTORIS (HCC): ICD-10-CM

## 2021-12-28 DIAGNOSIS — I21.4 NSTEMI (NON-ST ELEVATED MYOCARDIAL INFARCTION) (HCC): Primary | ICD-10-CM

## 2021-12-28 DIAGNOSIS — K50.90 CROHN'S DISEASE WITHOUT COMPLICATION, UNSPECIFIED GASTROINTESTINAL TRACT LOCATION (HCC): ICD-10-CM

## 2021-12-28 LAB — HBA1C MFR BLD: 10.5 %

## 2021-12-28 PROCEDURE — 4040F PNEUMOC VAC/ADMIN/RCVD: CPT | Performed by: FAMILY MEDICINE

## 2021-12-28 PROCEDURE — 3017F COLORECTAL CA SCREEN DOC REV: CPT | Performed by: FAMILY MEDICINE

## 2021-12-28 PROCEDURE — G8417 CALC BMI ABV UP PARAM F/U: HCPCS | Performed by: FAMILY MEDICINE

## 2021-12-28 PROCEDURE — G8427 DOCREV CUR MEDS BY ELIG CLIN: HCPCS | Performed by: FAMILY MEDICINE

## 2021-12-28 PROCEDURE — 83036 HEMOGLOBIN GLYCOSYLATED A1C: CPT | Performed by: FAMILY MEDICINE

## 2021-12-28 PROCEDURE — 1123F ACP DISCUSS/DSCN MKR DOCD: CPT | Performed by: FAMILY MEDICINE

## 2021-12-28 PROCEDURE — 1036F TOBACCO NON-USER: CPT | Performed by: FAMILY MEDICINE

## 2021-12-28 PROCEDURE — G8484 FLU IMMUNIZE NO ADMIN: HCPCS | Performed by: FAMILY MEDICINE

## 2021-12-28 PROCEDURE — 2022F DILAT RTA XM EVC RTNOPTHY: CPT | Performed by: FAMILY MEDICINE

## 2021-12-28 PROCEDURE — 99214 OFFICE O/P EST MOD 30 MIN: CPT | Performed by: FAMILY MEDICINE

## 2021-12-28 RX ORDER — INSULIN GLARGINE 100 [IU]/ML
INJECTION, SOLUTION SUBCUTANEOUS
Qty: 45 ML | Refills: 3 | Status: SHIPPED | OUTPATIENT
Start: 2021-12-28

## 2021-12-28 ASSESSMENT — ENCOUNTER SYMPTOMS
RHINORRHEA: 0
SHORTNESS OF BREATH: 0
COUGH: 0
NAUSEA: 0
SORE THROAT: 0
BLOOD IN STOOL: 0
VOMITING: 0
SINUS PRESSURE: 0
DIARRHEA: 0
ANAL BLEEDING: 0

## 2021-12-28 NOTE — PROGRESS NOTES
2021     Vallery Essex (:  1955) is a 73 y.o. male, here for evaluation of the following medical concerns:    HPI  1.  DM II-  reason for visit today, Takes lantus 55 at night, patient has A1C that was 10.4  down to 10.5, He hasn't been monitor glucose at home, not sure what else he can do? Seems frustrated, also taking glipizide 5 mg daily,  Metformin 500 BID, needs eye exam and foot exam today.      2.   CAD-  NSTEMI- 2017, follows with Dr. Pennie Carrion, last visit was in , Hospitalized from 2017-8/15/2017 with NSTEMI. [facility] underwent cardiac cath which resulted in SHAN x2 to RCA. LVEF 35% initially and follow up echo showing LVEF 55-60% (done after PCI).        3.  Crohn's- still following with GI, needs colonoscopy and will defer to his GI doctor.  Stated they have spoken concerning this and have a plan to revisit the test this year. Will follow up with GI for colonoscopy.     4. Hyperlipidemia- taking medication as prescribed, feels this is well controlled, last LDL is 27, HDL is 48, no side effects from the medication.      Today, denied chest pain, sob, n, v, or diarrhea.      Review of Systems   Constitutional: Negative for activity change, fatigue, fever and unexpected weight change. HENT: Negative for congestion, ear pain, rhinorrhea, sinus pressure and sore throat. Respiratory: Negative for cough and shortness of breath. Cardiovascular: Negative for chest pain, palpitations and leg swelling. Gastrointestinal: Positive for abdominal pain. Negative for anal bleeding, blood in stool, diarrhea, nausea and vomiting. Endocrine: Negative for cold intolerance, heat intolerance, polydipsia and polyphagia. Musculoskeletal: Negative for arthralgias. Skin: Negative for rash. Neurological: Negative for dizziness, syncope, light-headedness and headaches. Psychiatric/Behavioral: Negative for dysphoric mood. The patient is not nervous/anxious.         Prior to Visit Medications    Medication Sig Taking? Authorizing Provider   insulin glargine (LANTUS SOLOSTAR) 100 UNIT/ML injection pen INJECT 58 UNITS  SUBCUTANEOUSLY NIGHTLY Yes Edwin Bond, DO   dapagliflozin (FARXIGA) 10 MG tablet Take 1 tablet by mouth every morning Yes Edwin Bond DO   mesalamine (PENTASA) 500 MG extended release capsule TAKE 2 CAPSULES BY MOUTH  TWICE DAILY Yes Luzmaria Aragon, DO   glipiZIDE (GLUCOTROL XL) 5 MG extended release tablet Take 1 tablet by mouth daily Yes Edwin Bond, DO   metoprolol succinate (TOPROL XL) 25 MG extended release tablet Take 0.5 tablets by mouth nightly Yes Edwin Bond, DO   atorvastatin (LIPITOR) 40 MG tablet Take 1 tablet by mouth nightly Yes Edwin Bond, DO   furosemide (LASIX) 20 MG tablet TAKE 1 TABLET DAILY Yes Edwin Bond, DO   metFORMIN (GLUCOPHAGE) 500 MG tablet TAKE 1 TABLET BY MOUTH  TWICE DAILY WITH MEALS Yes Edwin Bond, DO   HYDROcodone-acetaminophen (NORCO)  MG per tablet Take 1 tablet by mouth every 6 hours as needed.  Yes Historical Provider, MD   nitroGLYCERIN (NITROSTAT) 0.4 MG SL tablet Place 1 tablet under the tongue every 5 minutes as needed for Chest pain Yes Edwin Bond DO   glucose (GLUTOSE) 40 % GEL Take 37.5 mLs by mouth as needed (low blood sugar (less than 70)) Yes Umang Doherty MD   inFLIXimab (REMICADE) 100 MG injection Infuse 5 mg/kg intravenously See Admin Instructions q 6-8 wks Yes Historical Provider, MD   aspirin 81 MG chewable tablet Take 1 tablet by mouth daily Yes Juan Pablo Christiansen MD        Social History     Tobacco Use    Smoking status: Former Smoker     Packs/day: 1.50     Years: 10.00     Pack years: 15.00     Types: Cigarettes     Quit date: 2017     Years since quittin.3    Smokeless tobacco: Former User     Quit date: 2017   Substance Use Topics    Alcohol use: Not Currently     Comment: rarely        Vitals:    21 1533   BP: 122/68   Pulse: 97   SpO2: 97%   Weight: 268 lb (121.6 kg)     Estimated body mass index is 34.41 kg/m² as calculated from the following:    Height as of 11/17/21: 6' 2\" (1.88 m). Weight as of this encounter: 268 lb (121.6 kg). Physical Exam  Vitals and nursing note reviewed. Constitutional:       Appearance: He is well-developed. He is obese. HENT:      Head: Normocephalic and atraumatic. Right Ear: External ear normal.      Left Ear: External ear normal.   Eyes:      Pupils: Pupils are equal, round, and reactive to light. Neck:      Thyroid: No thyromegaly. Cardiovascular:      Rate and Rhythm: Normal rate and regular rhythm. Heart sounds: No murmur heard. Pulmonary:      Effort: Pulmonary effort is normal.      Breath sounds: Normal breath sounds. No wheezing or rales. Abdominal:      General: Bowel sounds are normal.      Palpations: Abdomen is soft. Tenderness: There is no abdominal tenderness. Musculoskeletal:         General: Normal range of motion. Lymphadenopathy:      Cervical: No cervical adenopathy. Neurological:      Mental Status: He is alert and oriented to person, place, and time. Mental status is at baseline. Psychiatric:         Behavior: Behavior normal.         Judgment: Judgment normal.         ASSESSMENT/PLAN:  - POCT glycosylated hemoglobin (Hb A1C)  - insulin glargine (LANTUS SOLOSTAR) 100 UNIT/ML injection pen; INJECT 58 UNITS  SUBCUTANEOUSLY NIGHTLY  Dispense: 45 mL; Refill: 3    1. NSTEMI (non-ST elevated myocardial infarction) ([address]  Stable  Continue with medication  Keep appointments with specialist.   Willow Falcon questions    2. Coronary artery disease involving native coronary artery of native heart with angina pectoris ([address]  Stable  Continue with medication  Keep appointments with specialist.   Willow Falcon questions    3.  Crohn's disease without complication, unspecified gastrointestinal tract location ([address]  Stable  Continue with medication  Keep appointments with specialist.   Willow Falcon questions    4. Uncontrolled type 2 diabetes mellitus with hyperglycemia ([facility]  Patient will need to keep a log of his glucose readings and bring them to the office. He needs to monitor his diet and exercise. Attempt to lose weight. Discussed proper eating habits. Continue to take medication as prescribed. Will obtain A1C at this visit. Educated on signs and symptoms for immediate evaluation in the ER. 5. Ascending aortic aneurysm ([facility]  Stable  Continue with medication  Keep appointments with specialist.   [place] questions      No follow-ups on file.

## 2022-01-03 PROBLEM — I25.119 CORONARY ARTERY DISEASE INVOLVING NATIVE CORONARY ARTERY OF NATIVE HEART WITH ANGINA PECTORIS (HCC): Status: ACTIVE | Noted: 2022-01-03

## 2022-01-03 ASSESSMENT — ENCOUNTER SYMPTOMS: ABDOMINAL PAIN: 1

## 2022-01-04 ENCOUNTER — TELEPHONE (OUTPATIENT)
Dept: ORTHOPEDIC SURGERY | Age: 67
End: 2022-01-04

## 2022-01-04 NOTE — TELEPHONE ENCOUNTER
Attempted to contact patient to inform them about the [address] [place] [address] joint pain program. Patient can call [phone] to find out more information or to schedule an appointment with a joint pain orthopedic specialist.

## 2022-03-22 ENCOUNTER — OFFICE VISIT (OUTPATIENT)
Dept: FAMILY MEDICINE CLINIC | Age: 67
End: 2022-03-22
Payer: MEDICARE

## 2022-03-22 VITALS
WEIGHT: 266.2 LBS | SYSTOLIC BLOOD PRESSURE: 128 MMHG | OXYGEN SATURATION: 95 % | BODY MASS INDEX: 34.18 KG/M2 | DIASTOLIC BLOOD PRESSURE: 70 MMHG | HEART RATE: 71 BPM

## 2022-03-22 DIAGNOSIS — I71.21 ASCENDING AORTIC ANEURYSM: ICD-10-CM

## 2022-03-22 DIAGNOSIS — E11.9 TYPE 2 DIABETES MELLITUS WITHOUT COMPLICATION, WITHOUT LONG-TERM CURRENT USE OF INSULIN (HCC): Primary | ICD-10-CM

## 2022-03-22 DIAGNOSIS — I25.119 CORONARY ARTERY DISEASE INVOLVING NATIVE CORONARY ARTERY OF NATIVE HEART WITH ANGINA PECTORIS (HCC): ICD-10-CM

## 2022-03-22 DIAGNOSIS — K50.90 CROHN'S DISEASE WITHOUT COMPLICATION, UNSPECIFIED GASTROINTESTINAL TRACT LOCATION (HCC): ICD-10-CM

## 2022-03-22 DIAGNOSIS — E11.65 UNCONTROLLED TYPE 2 DIABETES MELLITUS WITH HYPERGLYCEMIA (HCC): ICD-10-CM

## 2022-03-22 PROCEDURE — 1123F ACP DISCUSS/DSCN MKR DOCD: CPT | Performed by: FAMILY MEDICINE

## 2022-03-22 PROCEDURE — G8484 FLU IMMUNIZE NO ADMIN: HCPCS | Performed by: FAMILY MEDICINE

## 2022-03-22 PROCEDURE — G8427 DOCREV CUR MEDS BY ELIG CLIN: HCPCS | Performed by: FAMILY MEDICINE

## 2022-03-22 PROCEDURE — 99214 OFFICE O/P EST MOD 30 MIN: CPT | Performed by: FAMILY MEDICINE

## 2022-03-22 PROCEDURE — 1036F TOBACCO NON-USER: CPT | Performed by: FAMILY MEDICINE

## 2022-03-22 PROCEDURE — 2022F DILAT RTA XM EVC RTNOPTHY: CPT | Performed by: FAMILY MEDICINE

## 2022-03-22 PROCEDURE — G8417 CALC BMI ABV UP PARAM F/U: HCPCS | Performed by: FAMILY MEDICINE

## 2022-03-22 PROCEDURE — 3046F HEMOGLOBIN A1C LEVEL >9.0%: CPT | Performed by: FAMILY MEDICINE

## 2022-03-22 PROCEDURE — 3017F COLORECTAL CA SCREEN DOC REV: CPT | Performed by: FAMILY MEDICINE

## 2022-03-22 PROCEDURE — 4040F PNEUMOC VAC/ADMIN/RCVD: CPT | Performed by: FAMILY MEDICINE

## 2022-03-22 ASSESSMENT — ENCOUNTER SYMPTOMS
VOMITING: 0
NAUSEA: 0
DIARRHEA: 0
SINUS PRESSURE: 0
RHINORRHEA: 0
COUGH: 0
SORE THROAT: 0
SHORTNESS OF BREATH: 0

## 2022-03-22 NOTE — PROGRESS NOTES
3/22/2022     Sada Ta (:  1955) is a 73 y.o. male, here for evaluation of the following medical concerns:    HPI  1.  DM II-  reason for visit today, Takes lantus 55 at night will increase this to 62, patient has A1C that was 10.4  down to 10.5, He hasn't been monitor glucose at home, not sure what else he can do? Seems frustrated, also taking glipizide 5 mg daily,  Metformin 500 BID, unable to tolerate higher dose due to Crohns,  needs eye exam and foot exam today.      2.   CAD-  NSTEMI- 2017/Ascending aortic aneurysm, follows with Dr. Celestino Hayes, last visit was in , Hospitalized from 2017-8/15/2017 with NSTEMI. [facility] underwent cardiac cath which resulted in SHAN x2 to RCA. LVEF 35% initially and follow up echo showing LVEF 55-60% (done after PCI).   aneurysm- 4 cm according to note from cardiology has been stable since 2017.     3.  Crohn's- still following with GI, needs colonoscopy and will defer to his GI doctor.  Stated they have spoken concerning this and have a plan to revisit the test this year.  Will follow up with GI for colonoscopy.     4. Hyperlipidemia- taking medication as prescribed, feels this is well controlled, last LDL is 27, HDL is 48, no side effects from the medication.      Today, denied chest pain, sob, n, v, or diarrhea.   Review of Systems   Constitutional: Positive for fatigue. Negative for activity change, fever and unexpected weight change. HENT: Negative for congestion, ear pain, mouth sores, rhinorrhea, sinus pressure and sore throat. Eyes: Negative for visual disturbance. Respiratory: Negative for cough and shortness of breath. Cardiovascular: Negative for chest pain, palpitations and leg swelling. Gastrointestinal: Positive for abdominal pain. Negative for diarrhea, nausea and vomiting. Musculoskeletal: Negative for arthralgias. Skin: Negative for rash. Neurological: Negative for light-headedness and headaches. Psychiatric/Behavioral: Negative for dysphoric mood. The patient is not nervous/anxious. Prior to Visit Medications    Medication Sig Taking? Authorizing Provider   insulin glargine (LANTUS SOLOSTAR) 100 UNIT/ML injection pen INJECT 58 UNITS  SUBCUTANEOUSLY NIGHTLY Yes Edwin Bond, DO   dapagliflozin (FARXIGA) 10 MG tablet Take 1 tablet by mouth every morning Yes Edwin Bond DO   mesalamine (PENTASA) 500 MG extended release capsule TAKE 2 CAPSULES BY MOUTH  TWICE DAILY Yes Luzmaria Aragon,    glipiZIDE (GLUCOTROL XL) 5 MG extended release tablet Take 1 tablet by mouth daily Yes Edwin Bond, DO   metoprolol succinate (TOPROL XL) 25 MG extended release tablet Take 0.5 tablets by mouth nightly Yes Edwin Bond DO   atorvastatin (LIPITOR) 40 MG tablet Take 1 tablet by mouth nightly Yes Edwin Bond DO   metFORMIN (GLUCOPHAGE) 500 MG tablet TAKE 1 TABLET BY MOUTH  TWICE DAILY WITH MEALS Yes Edwin Bond DO   HYDROcodone-acetaminophen (NORCO)  MG per tablet Take 1 tablet by mouth every 6 hours as needed.  Yes Historical Provider, MD   nitroGLYCERIN (NITROSTAT) 0.4 MG SL tablet Place 1 tablet under the tongue every 5 minutes as needed for Chest pain Yes Edwin Bond DO   glucose (GLUTOSE) 40 % GEL Take 37.5 mLs by mouth as needed (low blood sugar (less than 70)) Yes Colin Fernandez MD   inFLIXimab (REMICADE) 100 MG injection Infuse 5 mg/kg intravenously See Admin Instructions q 6-8 wks Yes Historical Provider, MD   aspirin 81 MG chewable tablet Take 1 tablet by mouth daily Yes Osiel Crockett MD   furosemide (LASIX) 20 MG tablet TAKE 1 TABLET BY MOUTH  DAILY  Carroll Carranza DO        Social History     Tobacco Use    Smoking status: Former Smoker     Packs/day: 1.50     Years: 10.00     Pack years: 15.00     Types: Cigarettes     Quit date: 2017     Years since quittin.6    Smokeless tobacco: Former User     Quit date: 2017   Substance Use Topics    Alcohol use: Not Currently     Comment: rarely        Vitals:    03/22/22 1144   BP: 128/70   Pulse: 71   SpO2: 95%   Weight: 266 lb 3.2 oz (120.7 kg)     Estimated body mass index is 34.18 kg/m² as calculated from the following:    Height as of 11/17/21: 6' 2\" (1.88 m). Weight as of this encounter: 266 lb 3.2 oz (120.7 kg). Physical Exam  Vitals and nursing note reviewed. Constitutional:       Appearance: He is well-developed. HENT:      Head: Normocephalic and atraumatic. Right Ear: Tympanic membrane, ear canal and external ear normal.      Left Ear: Tympanic membrane, ear canal and external ear normal.   Eyes:      Pupils: Pupils are equal, round, and reactive to light. Neck:      Thyroid: No thyromegaly. Cardiovascular:      Rate and Rhythm: Normal rate and regular rhythm. Heart sounds: No murmur heard. Pulmonary:      Effort: Pulmonary effort is normal.      Breath sounds: Normal breath sounds. No wheezing or rales. Abdominal:      General: Bowel sounds are normal.      Palpations: Abdomen is soft. Tenderness: There is no abdominal tenderness. Musculoskeletal:      Cervical back: Neck supple. Lymphadenopathy:      Cervical: No cervical adenopathy. Skin:     Findings: No rash. Neurological:      Mental Status: He is alert and oriented to person, place, and time. Cranial Nerves: No cranial nerve deficit. Deep Tendon Reflexes: Reflexes normal.   Psychiatric:         Behavior: Behavior normal.         Judgment: Judgment normal.         ASSESSMENT/PLAN:  1. Type 2 diabetes mellitus without complication, without long-term current use of insulin ([address]  Patient will need to keep a log of his glucose readings and bring them to the office. He needs to monitor his diet and exercise. Attempt to lose weight. Discussed proper eating habits.    Continue to take medication as prescribed, will increase his insulin to 58 units and educated him on how to titrate the medication, I have discussed this with him on multiple occasions. Will obtain A1C at this visit. Educated on signs and symptoms for immediate evaluation in the ER. 2. Coronary artery disease involving native coronary artery of native heart with angina pectoris ([address]  Stable  Continue with medication  Keep appointments with specialist.   [place] questions    3. Crohn's disease without complication, unspecified gastrointestinal tract location ([facility] [address]  Stable  Continue with medication  Keep appointments with specialist.   [place] Mis questions    4. Ascending aortic aneurysm ([facility] [address]  Stable  Continue with medication  Keep appointments with specialist.   [place] [facility] questions      No follow-ups on file.

## 2022-03-24 DIAGNOSIS — I25.5 ISCHEMIC CARDIOMYOPATHY: ICD-10-CM

## 2022-03-24 RX ORDER — FUROSEMIDE 20 MG/1
TABLET ORAL
Qty: 90 TABLET | Refills: 3 | Status: SHIPPED | OUTPATIENT
Start: 2022-03-24

## 2022-03-27 ASSESSMENT — ENCOUNTER SYMPTOMS: ABDOMINAL PAIN: 1

## 2022-04-04 RX ORDER — MESALAMINE 500 MG/1
CAPSULE, EXTENDED RELEASE ORAL
Qty: 360 CAPSULE | Refills: 3 | Status: SHIPPED | OUTPATIENT
Start: 2022-04-04

## 2022-04-04 NOTE — TELEPHONE ENCOUNTER
Last office visit 3/22/2022     Last written     Next office visit scheduled Visit date not found    Requested Prescriptions     Pending Prescriptions Disp Refills    metFORMIN (GLUCOPHAGE) 500 MG tablet [Pharmacy Med Name: metFORMIN HCl 500 MG Oral Tablet] 180 tablet 3     Sig: TAKE 1 TABLET BY MOUTH  TWICE DAILY WITH MEALS

## 2022-04-04 NOTE — TELEPHONE ENCOUNTER
Last office visit 3/22/2022     Last written     Next office visit scheduled 4/4/2022    Requested Prescriptions     Pending Prescriptions Disp Refills    mesalamine (PENTASA) 500 MG extended release capsule [Pharmacy Med Name: PENTASA  500MG  CAP  CR] 360 capsule 3     Sig: TAKE 2 CAPSULES BY MOUTH  TWICE DAILY

## 2022-04-11 RX ORDER — PEN NEEDLE, DIABETIC 32GX 5/32"
NEEDLE, DISPOSABLE MISCELLANEOUS
Qty: 90 EACH | Refills: 5 | Status: SHIPPED | OUTPATIENT
Start: 2022-04-11

## 2022-04-11 NOTE — TELEPHONE ENCOUNTER
Last office visit 3/22/2022     Last written     Next office visit scheduled 6/23/2022    Requested Prescriptions     Pending Prescriptions Disp Refills    BD PEN NEEDLE ALTHEA U/F 32G X 4 MM MISC [Pharmacy Med Name: [id number]] 75 each      Sig: USE 1 PENNEEDLE IN THE  MORNING BEFORE BREAKFAST

## 2022-04-25 DIAGNOSIS — I25.10 CORONARY ARTERY DISEASE INVOLVING NATIVE HEART WITHOUT ANGINA PECTORIS, UNSPECIFIED VESSEL OR LESION TYPE: ICD-10-CM

## 2022-04-25 DIAGNOSIS — E78.5 HYPERLIPIDEMIA, UNSPECIFIED HYPERLIPIDEMIA TYPE: ICD-10-CM

## 2022-04-25 RX ORDER — ATORVASTATIN CALCIUM 40 MG/1
TABLET, FILM COATED ORAL
Qty: 90 TABLET | Refills: 3 | Status: SHIPPED | OUTPATIENT
Start: 2022-04-25

## 2022-04-25 RX ORDER — GLIPIZIDE 5 MG/1
5 TABLET, FILM COATED, EXTENDED RELEASE ORAL DAILY
Qty: 90 TABLET | Refills: 3 | Status: SHIPPED | OUTPATIENT
Start: 2022-04-25

## 2022-04-25 NOTE — TELEPHONE ENCOUNTER
Last office visit 3/22/2022     Last written     Next office visit scheduled 6/23/2022    Requested Prescriptions     Pending Prescriptions Disp Refills    glipiZIDE (GLUCOTROL XL) 5 MG extended release tablet [Pharmacy Med Name: glipiZIDE ER 5 MG Oral Tablet Extended Release 24 Hour] 90 tablet 3     Sig: TAKE 1 TABLET BY MOUTH  DAILY    atorvastatin (LIPITOR) 40 MG tablet [Pharmacy Med Name: Atorvastatin Calcium 40 MG Oral Tablet] 90 tablet 3     Sig: TAKE 1 TABLET BY MOUTH AT  NIGHT

## 2022-05-09 DIAGNOSIS — I71.21 ASCENDING AORTIC ANEURYSM: ICD-10-CM

## 2022-05-09 DIAGNOSIS — I25.119 CORONARY ARTERY DISEASE INVOLVING NATIVE CORONARY ARTERY OF NATIVE HEART WITH ANGINA PECTORIS (HCC): ICD-10-CM

## 2022-05-09 DIAGNOSIS — E11.65 UNCONTROLLED TYPE 2 DIABETES MELLITUS WITH HYPERGLYCEMIA (HCC): ICD-10-CM

## 2022-05-09 DIAGNOSIS — I21.4 NSTEMI (NON-ST ELEVATED MYOCARDIAL INFARCTION) (HCC): ICD-10-CM

## 2022-05-09 DIAGNOSIS — K50.90 CROHN'S DISEASE WITHOUT COMPLICATION, UNSPECIFIED GASTROINTESTINAL TRACT LOCATION (HCC): ICD-10-CM

## 2022-05-09 RX ORDER — DAPAGLIFLOZIN 10 MG/1
TABLET, FILM COATED ORAL
Qty: 90 TABLET | Refills: 3 | Status: SHIPPED | OUTPATIENT
Start: 2022-05-09

## 2022-05-09 NOTE — TELEPHONE ENCOUNTER
Last office visit 3/22/2022     Last written      Next office visit scheduled 6/23/2022    Requested Prescriptions     Pending Prescriptions Disp Refills    FARXIGA 10 MG tablet [Pharmacy Med Name: FARXIGA  10MG  TAB] 90 tablet 3     Sig: TAKE 1 TABLET BY MOUTH IN  THE MORNING

## 2022-06-21 DIAGNOSIS — I25.10 CORONARY ARTERY DISEASE INVOLVING NATIVE HEART WITHOUT ANGINA PECTORIS, UNSPECIFIED VESSEL OR LESION TYPE: ICD-10-CM

## 2022-06-21 RX ORDER — METOPROLOL SUCCINATE 25 MG/1
12.5 TABLET, EXTENDED RELEASE ORAL NIGHTLY
Qty: 90 TABLET | Refills: 3 | Status: SHIPPED | OUTPATIENT
Start: 2022-06-21

## 2022-06-21 NOTE — TELEPHONE ENCOUNTER
This should be a quantity of 45 pills because they only come in 25 mg and they cut them in half.    Last ov 3/22/22  Future 6/23/22

## 2022-06-23 ENCOUNTER — OFFICE VISIT (OUTPATIENT)
Dept: FAMILY MEDICINE CLINIC | Age: 67
End: 2022-06-23
Payer: MEDICARE

## 2022-06-23 VITALS
BODY MASS INDEX: 32.92 KG/M2 | DIASTOLIC BLOOD PRESSURE: 72 MMHG | HEART RATE: 67 BPM | WEIGHT: 256.4 LBS | SYSTOLIC BLOOD PRESSURE: 122 MMHG | OXYGEN SATURATION: 96 %

## 2022-06-23 DIAGNOSIS — Z12.5 SCREENING PSA (PROSTATE SPECIFIC ANTIGEN): ICD-10-CM

## 2022-06-23 DIAGNOSIS — I25.119 CORONARY ARTERY DISEASE INVOLVING NATIVE CORONARY ARTERY OF NATIVE HEART WITH ANGINA PECTORIS (HCC): ICD-10-CM

## 2022-06-23 DIAGNOSIS — E11.65 UNCONTROLLED TYPE 2 DIABETES MELLITUS WITH HYPERGLYCEMIA (HCC): Primary | ICD-10-CM

## 2022-06-23 DIAGNOSIS — N52.9 ERECTILE DYSFUNCTION, UNSPECIFIED ERECTILE DYSFUNCTION TYPE: ICD-10-CM

## 2022-06-23 LAB — HBA1C MFR BLD: 8 %

## 2022-06-23 PROCEDURE — G8417 CALC BMI ABV UP PARAM F/U: HCPCS | Performed by: FAMILY MEDICINE

## 2022-06-23 PROCEDURE — 3017F COLORECTAL CA SCREEN DOC REV: CPT | Performed by: FAMILY MEDICINE

## 2022-06-23 PROCEDURE — 99214 OFFICE O/P EST MOD 30 MIN: CPT | Performed by: FAMILY MEDICINE

## 2022-06-23 PROCEDURE — 83036 HEMOGLOBIN GLYCOSYLATED A1C: CPT | Performed by: FAMILY MEDICINE

## 2022-06-23 PROCEDURE — 3052F HG A1C>EQUAL 8.0%<EQUAL 9.0%: CPT | Performed by: FAMILY MEDICINE

## 2022-06-23 PROCEDURE — 1036F TOBACCO NON-USER: CPT | Performed by: FAMILY MEDICINE

## 2022-06-23 PROCEDURE — 2022F DILAT RTA XM EVC RTNOPTHY: CPT | Performed by: FAMILY MEDICINE

## 2022-06-23 PROCEDURE — G8427 DOCREV CUR MEDS BY ELIG CLIN: HCPCS | Performed by: FAMILY MEDICINE

## 2022-06-23 PROCEDURE — 1123F ACP DISCUSS/DSCN MKR DOCD: CPT | Performed by: FAMILY MEDICINE

## 2022-06-23 RX ORDER — SILDENAFIL 50 MG/1
50 TABLET, FILM COATED ORAL PRN
Qty: 30 TABLET | Refills: 0 | Status: SHIPPED | OUTPATIENT
Start: 2022-06-23

## 2022-06-23 ASSESSMENT — PATIENT HEALTH QUESTIONNAIRE - PHQ9
SUM OF ALL RESPONSES TO PHQ9 QUESTIONS 1 & 2: 0
SUM OF ALL RESPONSES TO PHQ QUESTIONS 1-9: 0
2. FEELING DOWN, DEPRESSED OR HOPELESS: 0
SUM OF ALL RESPONSES TO PHQ QUESTIONS 1-9: 0
1. LITTLE INTEREST OR PLEASURE IN DOING THINGS: 0
SUM OF ALL RESPONSES TO PHQ QUESTIONS 1-9: 0
SUM OF ALL RESPONSES TO PHQ QUESTIONS 1-9: 0

## 2022-06-23 ASSESSMENT — ENCOUNTER SYMPTOMS
SINUS PRESSURE: 0
COUGH: 0
RHINORRHEA: 0
DIARRHEA: 0
NAUSEA: 0
VOMITING: 0
ABDOMINAL PAIN: 0
SORE THROAT: 0
SHORTNESS OF BREATH: 0

## 2022-06-23 NOTE — PROGRESS NOTES
2022     Khurram Hughes (:  1955) is a 78 y.o. male, here for evaluation of the following medical concerns:    HPI  HPI  1.  DM II-  reason for visit today, Takes lantus 62  at night will increase this to 62, patient has A1C that was 8.4  down to 10.5, He hasn't been monitor glucose at home, not sure what else he can do? Seems frustrated, also taking glipizide 5 mg daily,  Metformin 500 BID, unable to tolerate higher dose due to Crohns,  needs eye exam and foot exam today.      2.   CAD-  NSTEMI- 2017/Ascending aortic aneurysm, follows with Dr. Dony Galvan, last visit was in , Hospitalized from 2017-8/15/2017 with NSTEMI. [facility] underwent cardiac cath which resulted in SHAN x2 to RCA. LVEF 35% initially and follow up echo showing LVEF 55-60% (done after PCI).   aneurysm- 4 cm according to note from cardiology has been stable since 2017.     3.  Crohn's- still following with GI, needs colonoscopy and will defer to his GI doctor.  Stated they have spoken concerning this and have a plan to revisit the test this year.  Will follow up with GI for colonoscopy.     4. Hyperlipidemia- taking medication as prescribed, feels this is well controlled, last LDL is 27, HDL is 48, no side effects from the medication. 5.  Erectile dysfunction- requesting medication      Today, denied chest pain, sob, n, v, or diarrhea.   Review of Systems   Constitutional: Negative for activity change, fatigue, fever and unexpected weight change. HENT: Negative for congestion, ear pain, rhinorrhea, sinus pressure and sore throat. Respiratory: Negative for cough and shortness of breath. Cardiovascular: Negative for chest pain, palpitations and leg swelling. Gastrointestinal: Negative for abdominal pain, diarrhea, nausea and vomiting. Endocrine: Negative for cold intolerance, heat intolerance, polydipsia and polyphagia. Musculoskeletal: Positive for arthralgias. Skin: Negative for rash. Neurological: Negative for dizziness, syncope, light-headedness and headaches. Psychiatric/Behavioral: Negative for dysphoric mood. The patient is not nervous/anxious. Prior to Visit Medications    Medication Sig Taking? Authorizing Provider   sildenafil (VIAGRA) 50 MG tablet Take 1 tablet by mouth as needed for Erectile Dysfunction Yes Edwin Bond, DO   metoprolol succinate (TOPROL XL) 25 MG extended release tablet Take 0.5 tablets by mouth nightly Yes Edwin Bond DO   FARXIGA 10 MG tablet TAKE 1 TABLET BY MOUTH IN  THE MORNING Yes Edwin Bond DO   glipiZIDE (GLUCOTROL XL) 5 MG extended release tablet TAKE 1 TABLET BY MOUTH  DAILY Yes Edwin Bond DO   atorvastatin (LIPITOR) 40 MG tablet TAKE 1 TABLET BY MOUTH AT  NIGHT Yes Edwin Bond DO   BD PEN NEEDLE ALTHEA U/F 32G X 4 MM MISC USE 1 PENNEEDLE IN THE  MORNING BEFORE BREAKFAST Yes Edwin Bond DO   mesalamine (PENTASA) 500 MG extended release capsule TAKE 2 CAPSULES BY MOUTH  TWICE DAILY Yes Edwin Bond DO   metFORMIN (GLUCOPHAGE) 500 MG tablet TAKE 1 TABLET BY MOUTH  TWICE DAILY WITH MEALS Yes Edwin Bond DO   furosemide (LASIX) 20 MG tablet TAKE 1 TABLET BY MOUTH  DAILY Yes Edwin Bond DO   insulin glargine (LANTUS SOLOSTAR) 100 UNIT/ML injection pen INJECT 58 UNITS  SUBCUTANEOUSLY NIGHTLY Yes Edwin Bond, DO   HYDROcodone-acetaminophen (NORCO)  MG per tablet Take 1 tablet by mouth every 6 hours as needed.  Yes Historical Provider, MD   nitroGLYCERIN (NITROSTAT) 0.4 MG SL tablet Place 1 tablet under the tongue every 5 minutes as needed for Chest pain Yes Edwin Bond, DO   glucose (GLUTOSE) 40 % GEL Take 37.5 mLs by mouth as needed (low blood sugar (less than 70)) Yes Beatriz Chamorro MD   inFLIXimab (REMICADE) 100 MG injection Infuse 5 mg/kg intravenously See Admin Instructions q 6-8 wks Yes Historical Provider, MD   aspirin 81 MG chewable tablet Take 1 tablet by mouth daily Yes Malik Hooper MD        Social History     Tobacco Use    Smoking status: Former Smoker     Packs/day: 1.50     Years: 10.00     Pack years: 15.00     Types: Cigarettes     Quit date: 2017     Years since quittin.8    Smokeless tobacco: Former User     Quit date: 2017   Substance Use Topics    Alcohol use: Not Currently     Comment: rarely        Vitals:    22 1406   BP: 122/72   Pulse: 67   SpO2: 96%   Weight: 256 lb 6.4 oz (116.3 kg)     Estimated body mass index is 32.92 kg/m² as calculated from the following:    Height as of 21: 6' 2\" (1.88 m). Weight as of this encounter: 256 lb 6.4 oz (116.3 kg). Physical Exam  Vitals and nursing note reviewed. Constitutional:       Appearance: He is well-developed. HENT:      Head: Normocephalic and atraumatic. Right Ear: Tympanic membrane, ear canal and external ear normal.      Left Ear: Tympanic membrane, ear canal and external ear normal.   Neck:      Thyroid: No thyromegaly. Cardiovascular:      Rate and Rhythm: Normal rate and regular rhythm. Heart sounds: No murmur heard. Pulmonary:      Effort: Pulmonary effort is normal.      Breath sounds: Normal breath sounds. No wheezing or rales. Abdominal:      General: Bowel sounds are normal.      Palpations: Abdomen is soft. Tenderness: There is no abdominal tenderness. Musculoskeletal:         General: Normal range of motion. Skin:     Findings: No rash. Neurological:      Mental Status: He is alert and oriented to person, place, and time. Psychiatric:         Behavior: Behavior normal.         Judgment: Judgment normal.         ASSESSMENT/PLAN:  1. Uncontrolled type 2 diabetes mellitus with hyperglycemia ([address]  Patient will need to keep a log of his glucose readings and bring them to the office. He needs to monitor his diet and exercise. Attempt to lose weight. Discussed proper eating habits. Continue to take medication as prescribed. Will obtain A1C at this visit. Educated on signs and symptoms for immediate evaluation in the ER.   - POCT glycosylated hemoglobin (Hb A1C)  - Lipid Panel; Future  - PSA Screening; Future  -  DIABETES FOOT EXAM    2. Erectile dysfunction, unspecified erectile dysfunction type  Take medication as prescribed. Discussed conservative treatment  RTC if no improved. - Lipid Panel; Future  - PSA Screening; Future    3. Screening PSA (prostate specific antigen)  Stable  Continue with medication  Answered questions    4. Coronary artery disease involving native coronary artery of native heart with angina pectoris ([facility] [address]  Stable  Continue with medication  Keep appointments with specialist.   [place] questions      No follow-ups on file.

## 2022-06-27 ENCOUNTER — OFFICE VISIT (OUTPATIENT)
Dept: FAMILY MEDICINE CLINIC | Age: 67
End: 2022-06-27
Payer: MEDICARE

## 2022-06-27 DIAGNOSIS — Z00.00 INITIAL MEDICARE ANNUAL WELLNESS VISIT: Primary | ICD-10-CM

## 2022-06-27 PROCEDURE — 1123F ACP DISCUSS/DSCN MKR DOCD: CPT | Performed by: FAMILY MEDICINE

## 2022-06-27 PROCEDURE — 3017F COLORECTAL CA SCREEN DOC REV: CPT | Performed by: FAMILY MEDICINE

## 2022-06-27 PROCEDURE — G0438 PPPS, INITIAL VISIT: HCPCS | Performed by: FAMILY MEDICINE

## 2022-06-27 ASSESSMENT — PATIENT HEALTH QUESTIONNAIRE - PHQ9
SUM OF ALL RESPONSES TO PHQ QUESTIONS 1-9: 0
SUM OF ALL RESPONSES TO PHQ9 QUESTIONS 1 & 2: 0
SUM OF ALL RESPONSES TO PHQ QUESTIONS 1-9: 0
2. FEELING DOWN, DEPRESSED OR HOPELESS: 0
1. LITTLE INTEREST OR PLEASURE IN DOING THINGS: 0

## 2022-06-27 NOTE — PROGRESS NOTES
Medicare Annual Wellness Visit    Regina Gonzalez is here for Medicare AWV    Assessment & Plan    Recommendations for Preventive Services Due: see orders and patient instructions/AVS.  Recommended screening schedule for the next 5-10 years is provided to the patient in written form: see Patient Instructions/AVS.     No follow-ups on file. Subjective   The following acute and/or chronic problems were also addressed today:  No problem    Patient's complete Health Risk Assessment and screening values have been reviewed and are found in Flowsheets. The following problems were reviewed today and where indicated follow up appointments were made and/or referrals ordered.     Positive Risk Factor Screenings with Interventions:             General Health and ACP:  General  In general, how would you say your health is?: Good  In the past 7 days, have you experienced any of the following: New or Increased Pain, New or Increased Fatigue, Loneliness, Social Isolation, Stress or Anger?: No  Do you get the social and emotional support that you need?: Yes  Do you have a Living Will?: (!) No    Advance Directives     Power of  Living Will ACP-Advance Directive ACP-Power of     Not on File Not on File Not on File Not on File      General Health Risk Interventions:  · No Living Will: Patient declines ACP discussion/assistance    Health Habits/Nutrition:     Physical Activity: Insufficiently Active    Days of Exercise per Week: 2 days    Minutes of Exercise per Session: 50 min     Have you lost any weight without trying in the past 3 months?: (!) Yes     Have you seen the dentist within the past year?: (!) No    Health Habits/Nutrition Interventions:  · Inadequate physical activity:  patient agrees to join a structured exercise program- walking and working    Hearing/Vision:  Do you or your family notice any trouble with your hearing that hasn't been managed with hearing aids?: No  Do you have difficulty driving, watching TV, or doing any of your daily activities because of your eyesight?: No  Have you had an eye exam within the past year?: (!) No  No exam data present    Hearing/Vision Interventions:  · no concernes today    Safety:  Do you have working smoke detectors?: (!) No  Do you have any tripping hazards - loose or unsecured carpets or rugs?: No  Do you have any tripping hazards - clutter in doorways, halls, or stairs?: No  Do you have either shower bars, grab bars, non-slip mats or non-slip surfaces in your shower or bathtub?: (!) No  Do all of your stairways have a railing or banister?: (!) No  Do you always fasten your seatbelt when you are in a car?: Yes    Safety Interventions:  · Home safety tips provided           Objective   There were no vitals filed for this visit. There is no height or weight on file to calculate BMI. No PE       Allergies   Allergen Reactions    Codeine Other (See Comments)     Pt unsure of exact reaction  Other reaction(s): GI Upset  Pt unsure of exact reaction    Oxycodone-Acetaminophen Other (See Comments)     Pt \"doesn't feel right\" when taking. Other reaction(s): GI Upset  Pt \"doesn't feel right\" when taking.  Percocet [Oxycodone-Acetaminophen]      Pt \"doesn't feel right\" when taking.  Oxycodone Nausea And Vomiting     Prior to Visit Medications    Medication Sig Taking?  Authorizing Provider   sildenafil (VIAGRA) 50 MG tablet Take 1 tablet by mouth as needed for Erectile Dysfunction Yes Edwin Bond, DO   metoprolol succinate (TOPROL XL) 25 MG extended release tablet Take 0.5 tablets by mouth nightly Yes Edwin Bond DO   FARXIGA 10 MG tablet TAKE 1 TABLET BY MOUTH IN  THE MORNING Yes Edwin Bond DO   glipiZIDE (GLUCOTROL XL) 5 MG extended release tablet TAKE 1 TABLET BY MOUTH  DAILY Yes Edwin Bond, DO   atorvastatin (LIPITOR) 40 MG tablet TAKE 1 TABLET BY MOUTH AT  NIGHT Yes Ewdin Bond, DO   BD PEN NEEDLE ALTHEA U/F 32G X 4 MM MISC USE 1 PENNEEDLE IN THE  MORNING BEFORE BREAKFAST Yes Edwin Bond DO   mesalamine (PENTASA) 500 MG extended release capsule TAKE 2 CAPSULES BY MOUTH  TWICE DAILY Yes Edwin Bond DO   metFORMIN (GLUCOPHAGE) 500 MG tablet TAKE 1 TABLET BY MOUTH  TWICE DAILY WITH MEALS Yes Edwin Bond, DO   furosemide (LASIX) 20 MG tablet TAKE 1 TABLET BY MOUTH  DAILY Yes Edwin Bond, DO   insulin glargine (LANTUS SOLOSTAR) 100 UNIT/ML injection pen INJECT 58 UNITS  SUBCUTANEOUSLY NIGHTLY Yes Edwin Bond, DO   HYDROcodone-acetaminophen (NORCO)  MG per tablet Take 1 tablet by mouth every 6 hours as needed.  Yes Historical Provider, MD   nitroGLYCERIN (NITROSTAT) 0.4 MG SL tablet Place 1 tablet under the tongue every 5 minutes as needed for Chest pain Yes Edwin Bond DO   glucose (GLUTOSE) 40 % GEL Take 37.5 mLs by mouth as needed (low blood sugar (less than 70)) Yes Alejandrina Scott MD   inFLIXimab (REMICADE) 100 MG injection Infuse 5 mg/kg intravenously See Admin Instructions q 6-8 wks Yes Historical Provider, MD   aspirin 81 MG chewable tablet Take 1 tablet by mouth daily Yes Chau Rolle MD       [facility] (Including outside providers/suppliers regularly involved in providing care):   Patient Care Team:  Ermelinda Mckinney DO as PCP - General (Family Medicine)  Ermelinda Mckinney DO as PCP - REHABILITATION [facility] Empaneled Provider     Reviewed and updated this visit:  Allergies  Meds

## 2022-06-27 NOTE — PATIENT INSTRUCTIONS
Personalized Preventive Plan for Mei Banks - 6/27/2022  Medicare offers a range of preventive health benefits. Some of the tests and screenings are paid in full while other may be subject to a deductible, co-insurance, and/or copay. Some of these benefits include a comprehensive review of your medical history including lifestyle, illnesses that may run in your family, and various assessments and screenings as appropriate. After reviewing your medical record and screening and assessments performed today your provider may have ordered immunizations, labs, imaging, and/or referrals for you. A list of these orders (if applicable) as well as your Preventive Care list are included within your After Visit Summary for your review. Other Preventive Recommendations:    · A preventive eye exam performed by an eye specialist is recommended every 1-2 years to screen for glaucoma; cataracts, macular degeneration, and other eye disorders. · A preventive dental visit is recommended every 6 months. · Try to get at least 150 minutes of exercise per week or 10,000 steps per day on a pedometer . · Order or download the FREE \"Exercise & Physical Activity: Your Everyday Guide\" from [employer]. Call 3-989.706.6181 or search [employer] online. · You need 6730-3531 mg of calcium and 0103-0026 IU of vitamin D per day. It is possible to meet your calcium requirement with diet alone, but a vitamin D supplement is usually necessary to meet this goal.  · When exposed to the sun, use a sunscreen that protects against both UVA and UVB radiation with an SPF of 30 or greater. Reapply every 2 to 3 hours or after sweating, drying off with a towel, or swimming. · Always wear a seat belt when traveling in a car. Always wear a helmet when riding a bicycle or motorcycle.

## 2022-06-28 ENCOUNTER — TELEPHONE (OUTPATIENT)
Dept: FAMILY MEDICINE CLINIC | Age: 67
End: 2022-06-28

## 2022-06-28 RX ORDER — ZOSTER VACCINE RECOMBINANT, ADJUVANTED 50 MCG/0.5
0.5 KIT INTRAMUSCULAR SEE ADMIN INSTRUCTIONS
Qty: 0.5 ML | Refills: 0 | Status: SHIPPED | OUTPATIENT
Start: 2022-06-28 | End: 2022-12-25

## 2022-06-28 NOTE — TELEPHONE ENCOUNTER
Let the patient know he can go to his pharmacy and register for the vaccination. We will fax over the order but he doesn't need this.

## 2022-06-28 NOTE — TELEPHONE ENCOUNTER
----- Message from [address] sent at 6/28/2022 10:04 AM EDT -----  Subject: Message to Provider    QUESTIONS  Information for Provider? pt is wondering what pharmacy his shingle shot   order was sent to. Please call pt with details  ---------------------------------------------------------------------------  --------------  CALL BACK INFO  What is the best way for the office to contact you? OK to leave message on   voicemail  Preferred Call Back Phone Number? [phone]  ---------------------------------------------------------------------------  --------------  SCRIPT ANSWERS  Relationship to Patient? Other  Representative Name? Quinton Love  Is the Representative on the appropriate HIPAA document in Epic?  Yes

## 2022-07-06 NOTE — TELEPHONE ENCOUNTER
Not sure if it was taken care of previously, however I did call this morning, no answer, left msg for pt to contact his pharmacy for vac.

## 2022-07-14 DIAGNOSIS — E11.65 UNCONTROLLED TYPE 2 DIABETES MELLITUS WITH HYPERGLYCEMIA (HCC): ICD-10-CM

## 2022-07-14 DIAGNOSIS — N52.9 ERECTILE DYSFUNCTION, UNSPECIFIED ERECTILE DYSFUNCTION TYPE: ICD-10-CM

## 2022-07-14 LAB
CHOLESTEROL, TOTAL: 112 MG/DL (ref 0–199)
HDLC SERPL-MCNC: 46 MG/DL (ref 40–60)
LDL CHOLESTEROL CALCULATED: 47 MG/DL
PROSTATE SPECIFIC ANTIGEN: 0.28 NG/ML (ref 0–4)
TRIGL SERPL-MCNC: 97 MG/DL (ref 0–150)
VLDLC SERPL CALC-MCNC: 19 MG/DL

## 2022-08-04 ENCOUNTER — TELEPHONE (OUTPATIENT)
Dept: FAMILY MEDICINE CLINIC | Age: 67
End: 2022-08-04

## 2022-08-04 NOTE — TELEPHONE ENCOUNTER
Patient's wife called stating the patient took a viagra and it did not work.  Inquiring if the patient can take two pills

## 2022-09-23 ENCOUNTER — OFFICE VISIT (OUTPATIENT)
Dept: FAMILY MEDICINE CLINIC | Age: 67
End: 2022-09-23
Payer: MEDICARE

## 2022-09-23 VITALS
SYSTOLIC BLOOD PRESSURE: 102 MMHG | HEIGHT: 74 IN | BODY MASS INDEX: 32.85 KG/M2 | DIASTOLIC BLOOD PRESSURE: 68 MMHG | WEIGHT: 256 LBS

## 2022-09-23 DIAGNOSIS — I25.119 CORONARY ARTERY DISEASE INVOLVING NATIVE CORONARY ARTERY OF NATIVE HEART WITH ANGINA PECTORIS (HCC): Primary | ICD-10-CM

## 2022-09-23 DIAGNOSIS — E11.65 UNCONTROLLED TYPE 2 DIABETES MELLITUS WITH HYPERGLYCEMIA (HCC): ICD-10-CM

## 2022-09-23 DIAGNOSIS — E78.2 MIXED HYPERLIPIDEMIA: ICD-10-CM

## 2022-09-23 PROBLEM — E11.9 TYPE 2 DIABETES MELLITUS WITHOUT COMPLICATION, WITHOUT LONG-TERM CURRENT USE OF INSULIN (HCC): Status: RESOLVED | Noted: 2017-08-13 | Resolved: 2022-09-23

## 2022-09-23 LAB — HBA1C MFR BLD: 8.1 %

## 2022-09-23 PROCEDURE — 3052F HG A1C>EQUAL 8.0%<EQUAL 9.0%: CPT | Performed by: FAMILY MEDICINE

## 2022-09-23 PROCEDURE — 1036F TOBACCO NON-USER: CPT | Performed by: FAMILY MEDICINE

## 2022-09-23 PROCEDURE — G8427 DOCREV CUR MEDS BY ELIG CLIN: HCPCS | Performed by: FAMILY MEDICINE

## 2022-09-23 PROCEDURE — 3017F COLORECTAL CA SCREEN DOC REV: CPT | Performed by: FAMILY MEDICINE

## 2022-09-23 PROCEDURE — 2022F DILAT RTA XM EVC RTNOPTHY: CPT | Performed by: FAMILY MEDICINE

## 2022-09-23 PROCEDURE — 1123F ACP DISCUSS/DSCN MKR DOCD: CPT | Performed by: FAMILY MEDICINE

## 2022-09-23 PROCEDURE — G8417 CALC BMI ABV UP PARAM F/U: HCPCS | Performed by: FAMILY MEDICINE

## 2022-09-23 PROCEDURE — 99214 OFFICE O/P EST MOD 30 MIN: CPT | Performed by: FAMILY MEDICINE

## 2022-09-23 PROCEDURE — 83036 HEMOGLOBIN GLYCOSYLATED A1C: CPT | Performed by: FAMILY MEDICINE

## 2022-09-23 ASSESSMENT — ENCOUNTER SYMPTOMS
VOMITING: 0
EYE DISCHARGE: 0
TROUBLE SWALLOWING: 0
RHINORRHEA: 0
ABDOMINAL PAIN: 0
SINUS PRESSURE: 0
NAUSEA: 0
COUGH: 0
SORE THROAT: 0
DIARRHEA: 0
SHORTNESS OF BREATH: 0

## 2022-09-23 NOTE — PROGRESS NOTES
2022     Carmen Galindo (:  1955) is a 78 y.o. male, here for evaluation of the following medical concerns:    HPI    Today, patient followed up on his chronic medical conditions. Overall he feels well. 1.  DM II-  reason for visit today, Takes lantus 62  at night will increase this to 58, patient has A1C that was 8.1  down to 10.5, He hasn't been monitor glucose at home, not sure what else he can do? Seems frustrated, also taking glipizide 5 mg daily,  Metformin 500 BID, unable to tolerate higher dose due to Crohns,  needs eye exam and foot exam today. 2.   CAD-  NSTEMI- 2017/Ascending aortic aneurysm, follows with Dr. Doris Palacios, last visit was in , Hospitalized from 2017-8/15/2017 with NSTEMI. He underwent cardiac cath which resulted in SHAN x2 to RCA. LVEF 35% initially and follow up echo showing LVEF 55-60% (done after PCI). aneurysm- 4 cm according to note from cardiology has been stable since 2017. 3.  Crohn's- still following with GI, needs colonoscopy and will defer to his GI doctor. Stated they have spoken concerning this and have a plan to revisit the test this year. Will follow up with GI for colonoscopy. 4. Hyperlipidemia- taking medication as prescribed, feels this is well controlled, last LDL is 27, HDL is 48, no side effects from the medication. Today, denied chest pain, sob, n, v, or diarrhea. Review of Systems   Constitutional:  Positive for fatigue. Negative for activity change, fever and unexpected weight change. HENT:  Negative for congestion, ear pain, rhinorrhea, sinus pressure, sore throat and trouble swallowing. Eyes:  Negative for discharge. Respiratory:  Negative for cough and shortness of breath. Cardiovascular:  Negative for chest pain, palpitations and leg swelling. Gastrointestinal:  Negative for abdominal pain, diarrhea, nausea and vomiting. Musculoskeletal:  Negative for arthralgias.    Skin:  Negative for MG injection Infuse 5 mg/kg intravenously See Admin Instructions q 6-8 wks Yes Historical Provider, MD   aspirin 81 MG chewable tablet Take 1 tablet by mouth daily Yes Andra Bella MD        Social History     Tobacco Use    Smoking status: Former     Packs/day: 1.50     Years: 10.00     Pack years: 15.00     Types: Cigarettes     Quit date: 2017     Years since quittin.1    Smokeless tobacco: Former     Quit date: 2017   Substance Use Topics    Alcohol use: Not Currently     Comment: rarely        Vitals:    22 1327   BP: 102/68   Weight: 256 lb (116.1 kg)   Height: 6' 2\" (1.88 m)     Estimated body mass index is 32.87 kg/m² as calculated from the following:    Height as of this encounter: 6' 2\" (1.88 m). Weight as of this encounter: 256 lb (116.1 kg). Physical Exam  Constitutional:       Appearance: He is well-developed. HENT:      Head: Normocephalic and atraumatic. Right Ear: External ear normal.      Left Ear: External ear normal.   Eyes:      Pupils: Pupils are equal, round, and reactive to light. Neck:      Thyroid: No thyromegaly. Cardiovascular:      Rate and Rhythm: Normal rate and regular rhythm. Heart sounds: No murmur heard. Pulmonary:      Effort: Pulmonary effort is normal.      Breath sounds: No wheezing, rhonchi or rales. Abdominal:      General: Bowel sounds are normal.      Palpations: Abdomen is soft. Tenderness: There is no abdominal tenderness. Musculoskeletal:         General: Normal range of motion. Cervical back: Neck supple. Lymphadenopathy:      Cervical: No cervical adenopathy. Skin:     Findings: No rash. Neurological:      Mental Status: He is alert and oriented to person, place, and time. Psychiatric:         Behavior: Behavior normal.         Judgment: Judgment normal.       ASSESSMENT/PLAN:  1.  Coronary artery disease involving native coronary artery of native heart with angina pectoris ([address]  Take medication as prescribed. Discussed the need to exercise 30 minutes each day. Monitor diet, avoid fried foods etc... Educated on need to reduce cholesterol in diet and results of poor diet. 2. Uncontrolled type 2 diabetes mellitus with hyperglycemia ([address]  Patient will need to keep a log of his glucose readings and bring them to the office. He needs to monitor his diet and exercise. Attempt to lose weight. Discussed proper eating habits. Continue to take medication as prescribed. Will obtain A1C at this visit. Educated on signs and symptoms for immediate evaluation in the ER.    - POCT glycosylated hemoglobin (Hb A1C)    3. Mixed hyperlipidemia  Take medication as prescribed. Discussed the need to exercise 30 minutes each day. Monitor diet, avoid fried foods etc... Educated on need to reduce cholesterol in diet and results of poor diet. Return in about 3 months (around 12/23/2022).

## 2022-11-22 RX ORDER — PEN NEEDLE, DIABETIC 32GX 5/32"
NEEDLE, DISPOSABLE MISCELLANEOUS
Qty: 100 EACH | Refills: 5 | Status: SHIPPED | OUTPATIENT
Start: 2022-11-22

## 2022-12-15 ENCOUNTER — APPOINTMENT (OUTPATIENT)
Dept: GENERAL RADIOLOGY | Age: 67
End: 2022-12-15
Payer: MEDICARE

## 2022-12-15 ENCOUNTER — APPOINTMENT (OUTPATIENT)
Dept: CT IMAGING | Age: 67
End: 2022-12-15
Payer: MEDICARE

## 2022-12-15 ENCOUNTER — TELEPHONE (OUTPATIENT)
Dept: FAMILY MEDICINE CLINIC | Age: 67
End: 2022-12-15

## 2022-12-15 ENCOUNTER — HOSPITAL ENCOUNTER (INPATIENT)
Age: 67
LOS: 1 days | Discharge: HOME | End: 2022-12-16
Attending: EMERGENCY MEDICINE | Admitting: STUDENT IN AN ORGANIZED HEALTH CARE EDUCATION/TRAINING PROGRAM
Payer: MEDICARE

## 2022-12-15 DIAGNOSIS — R07.9 CHEST PAIN, UNSPECIFIED TYPE: Primary | ICD-10-CM

## 2022-12-15 LAB
A/G RATIO: 1 (ref 1.1–2.2)
ALBUMIN SERPL-MCNC: 3.7 G/DL (ref 3.4–5)
ALP BLD-CCNC: 102 U/L (ref 40–129)
ALT SERPL-CCNC: 20 U/L (ref 10–40)
ANION GAP SERPL CALCULATED.3IONS-SCNC: 14 MMOL/L (ref 3–16)
AST SERPL-CCNC: 14 U/L (ref 15–37)
BASE EXCESS VENOUS: -2.6 MMOL/L
BASOPHILS ABSOLUTE: 0 K/UL (ref 0–0.2)
BASOPHILS RELATIVE PERCENT: 0.7 %
BILIRUB SERPL-MCNC: 0.6 MG/DL (ref 0–1)
BUN BLDV-MCNC: 17 MG/DL (ref 7–20)
CALCIUM SERPL-MCNC: 9.3 MG/DL (ref 8.3–10.6)
CARBOXYHEMOGLOBIN: 1.1 %
CHLORIDE BLD-SCNC: 105 MMOL/L (ref 99–110)
CO2: 20 MMOL/L (ref 21–32)
CREAT SERPL-MCNC: 0.9 MG/DL (ref 0.8–1.3)
EKG ATRIAL RATE: 86 BPM
EKG ATRIAL RATE: 94 BPM
EKG DIAGNOSIS: NORMAL
EKG DIAGNOSIS: NORMAL
EKG P AXIS: 37 DEGREES
EKG P AXIS: 41 DEGREES
EKG P-R INTERVAL: 164 MS
EKG P-R INTERVAL: 166 MS
EKG Q-T INTERVAL: 338 MS
EKG Q-T INTERVAL: 360 MS
EKG QRS DURATION: 100 MS
EKG QRS DURATION: 96 MS
EKG QTC CALCULATION (BAZETT): 422 MS
EKG QTC CALCULATION (BAZETT): 430 MS
EKG R AXIS: 30 DEGREES
EKG R AXIS: 31 DEGREES
EKG T AXIS: 11 DEGREES
EKG T AXIS: 24 DEGREES
EKG VENTRICULAR RATE: 86 BPM
EKG VENTRICULAR RATE: 94 BPM
EOSINOPHILS ABSOLUTE: 0.1 K/UL (ref 0–0.6)
EOSINOPHILS RELATIVE PERCENT: 1.4 %
GFR SERPL CREATININE-BSD FRML MDRD: >60 ML/MIN/{1.73_M2}
GLUCOSE BLD-MCNC: 155 MG/DL (ref 70–99)
GLUCOSE BLD-MCNC: 177 MG/DL (ref 70–99)
GLUCOSE BLD-MCNC: 192 MG/DL (ref 70–99)
GLUCOSE BLD-MCNC: 217 MG/DL (ref 70–99)
HCO3 VENOUS: 24 MMOL/L (ref 23–29)
HCT VFR BLD CALC: 47.1 % (ref 40.5–52.5)
HEMOGLOBIN: 15.3 G/DL (ref 13.5–17.5)
LACTIC ACID: 1.5 MMOL/L (ref 0.4–2)
LIPASE: 40 U/L (ref 13–60)
LYMPHOCYTES ABSOLUTE: 1.6 K/UL (ref 1–5.1)
LYMPHOCYTES RELATIVE PERCENT: 25.3 %
MCH RBC QN AUTO: 27.4 PG (ref 26–34)
MCHC RBC AUTO-ENTMCNC: 32.4 G/DL (ref 31–36)
MCV RBC AUTO: 84.8 FL (ref 80–100)
METHEMOGLOBIN VENOUS: 0.3 %
MONOCYTES ABSOLUTE: 0.6 K/UL (ref 0–1.3)
MONOCYTES RELATIVE PERCENT: 9.2 %
NEUTROPHILS ABSOLUTE: 3.9 K/UL (ref 1.7–7.7)
NEUTROPHILS RELATIVE PERCENT: 63.4 %
O2 SAT, VEN: 89 %
O2 THERAPY: ABNORMAL
PCO2, VEN: 45 MMHG (ref 40–50)
PDW BLD-RTO: 14.6 % (ref 12.4–15.4)
PERFORMED ON: ABNORMAL
PH VENOUS: 7.33 (ref 7.35–7.45)
PLATELET # BLD: 172 K/UL (ref 135–450)
PMV BLD AUTO: 8.3 FL (ref 5–10.5)
PO2, VEN: 59 MMHG
POTASSIUM REFLEX MAGNESIUM: 3.9 MMOL/L (ref 3.5–5.1)
PRO-BNP: 31 PG/ML (ref 0–124)
RAPID INFLUENZA  B AGN: NEGATIVE
RAPID INFLUENZA A AGN: NEGATIVE
RBC # BLD: 5.56 M/UL (ref 4.2–5.9)
SARS-COV-2, NAAT: NOT DETECTED
SODIUM BLD-SCNC: 139 MMOL/L (ref 136–145)
TCO2 CALC VENOUS: 25 MMOL/L
TOTAL PROTEIN: 7.4 G/DL (ref 6.4–8.2)
TROPONIN: <0.01 NG/ML
WBC # BLD: 6.2 K/UL (ref 4–11)

## 2022-12-15 PROCEDURE — 85025 COMPLETE CBC W/AUTO DIFF WBC: CPT

## 2022-12-15 PROCEDURE — 96361 HYDRATE IV INFUSION ADD-ON: CPT

## 2022-12-15 PROCEDURE — 82803 BLOOD GASES ANY COMBINATION: CPT

## 2022-12-15 PROCEDURE — 87635 SARS-COV-2 COVID-19 AMP PRB: CPT

## 2022-12-15 PROCEDURE — 83880 ASSAY OF NATRIURETIC PEPTIDE: CPT

## 2022-12-15 PROCEDURE — 2580000003 HC RX 258: Performed by: EMERGENCY MEDICINE

## 2022-12-15 PROCEDURE — 83690 ASSAY OF LIPASE: CPT

## 2022-12-15 PROCEDURE — 2580000003 HC RX 258: Performed by: STUDENT IN AN ORGANIZED HEALTH CARE EDUCATION/TRAINING PROGRAM

## 2022-12-15 PROCEDURE — 84484 ASSAY OF TROPONIN QUANT: CPT

## 2022-12-15 PROCEDURE — 96375 TX/PRO/DX INJ NEW DRUG ADDON: CPT

## 2022-12-15 PROCEDURE — 1200000000 HC SEMI PRIVATE

## 2022-12-15 PROCEDURE — 6360000002 HC RX W HCPCS: Performed by: STUDENT IN AN ORGANIZED HEALTH CARE EDUCATION/TRAINING PROGRAM

## 2022-12-15 PROCEDURE — 71045 X-RAY EXAM CHEST 1 VIEW: CPT

## 2022-12-15 PROCEDURE — 83605 ASSAY OF LACTIC ACID: CPT

## 2022-12-15 PROCEDURE — 2580000003 HC RX 258: Performed by: NURSE PRACTITIONER

## 2022-12-15 PROCEDURE — 96374 THER/PROPH/DIAG INJ IV PUSH: CPT

## 2022-12-15 PROCEDURE — 93005 ELECTROCARDIOGRAM TRACING: CPT | Performed by: EMERGENCY MEDICINE

## 2022-12-15 PROCEDURE — 6370000000 HC RX 637 (ALT 250 FOR IP): Performed by: EMERGENCY MEDICINE

## 2022-12-15 PROCEDURE — 87804 INFLUENZA ASSAY W/OPTIC: CPT

## 2022-12-15 PROCEDURE — 93010 ELECTROCARDIOGRAM REPORT: CPT | Performed by: INTERNAL MEDICINE

## 2022-12-15 PROCEDURE — 6370000000 HC RX 637 (ALT 250 FOR IP): Performed by: STUDENT IN AN ORGANIZED HEALTH CARE EDUCATION/TRAINING PROGRAM

## 2022-12-15 PROCEDURE — 36415 COLL VENOUS BLD VENIPUNCTURE: CPT

## 2022-12-15 PROCEDURE — 99285 EMERGENCY DEPT VISIT HI MDM: CPT

## 2022-12-15 PROCEDURE — 99222 1ST HOSP IP/OBS MODERATE 55: CPT | Performed by: NURSE PRACTITIONER

## 2022-12-15 PROCEDURE — 6360000004 HC RX CONTRAST MEDICATION: Performed by: INTERNAL MEDICINE

## 2022-12-15 PROCEDURE — 74177 CT ABD & PELVIS W/CONTRAST: CPT

## 2022-12-15 PROCEDURE — 6370000000 HC RX 637 (ALT 250 FOR IP): Performed by: INTERNAL MEDICINE

## 2022-12-15 PROCEDURE — 80053 COMPREHEN METABOLIC PANEL: CPT

## 2022-12-15 PROCEDURE — 6360000002 HC RX W HCPCS: Performed by: EMERGENCY MEDICINE

## 2022-12-15 RX ORDER — ENOXAPARIN SODIUM 100 MG/ML
40 INJECTION SUBCUTANEOUS DAILY
Status: DISCONTINUED | OUTPATIENT
Start: 2022-12-15 | End: 2022-12-15 | Stop reason: DRUGHIGH

## 2022-12-15 RX ORDER — ACETAMINOPHEN 650 MG/1
650 SUPPOSITORY RECTAL EVERY 6 HOURS PRN
Status: DISCONTINUED | OUTPATIENT
Start: 2022-12-15 | End: 2022-12-16 | Stop reason: HOSPADM

## 2022-12-15 RX ORDER — SODIUM CHLORIDE 9 MG/ML
INJECTION, SOLUTION INTRAVENOUS CONTINUOUS
Status: DISCONTINUED | OUTPATIENT
Start: 2022-12-16 | End: 2022-12-16 | Stop reason: HOSPADM

## 2022-12-15 RX ORDER — MORPHINE SULFATE 4 MG/ML
4 INJECTION, SOLUTION INTRAMUSCULAR; INTRAVENOUS ONCE
Status: COMPLETED | OUTPATIENT
Start: 2022-12-15 | End: 2022-12-15

## 2022-12-15 RX ORDER — ASPIRIN 81 MG/1
81 TABLET, CHEWABLE ORAL DAILY
Status: DISCONTINUED | OUTPATIENT
Start: 2022-12-17 | End: 2022-12-16 | Stop reason: HOSPADM

## 2022-12-15 RX ORDER — MORPHINE SULFATE 2 MG/ML
2 INJECTION, SOLUTION INTRAMUSCULAR; INTRAVENOUS EVERY 4 HOURS PRN
Status: DISCONTINUED | OUTPATIENT
Start: 2022-12-15 | End: 2022-12-16 | Stop reason: HOSPADM

## 2022-12-15 RX ORDER — ASPIRIN 325 MG
325 TABLET ORAL ONCE
Status: COMPLETED | OUTPATIENT
Start: 2022-12-15 | End: 2022-12-15

## 2022-12-15 RX ORDER — INSULIN LISPRO 100 [IU]/ML
0-4 INJECTION, SOLUTION INTRAVENOUS; SUBCUTANEOUS NIGHTLY
Status: DISCONTINUED | OUTPATIENT
Start: 2022-12-15 | End: 2022-12-16 | Stop reason: HOSPADM

## 2022-12-15 RX ORDER — INSULIN LISPRO 100 [IU]/ML
0-4 INJECTION, SOLUTION INTRAVENOUS; SUBCUTANEOUS
Status: DISCONTINUED | OUTPATIENT
Start: 2022-12-15 | End: 2022-12-16 | Stop reason: HOSPADM

## 2022-12-15 RX ORDER — ASPIRIN 325 MG
TABLET ORAL
Status: DISPENSED
Start: 2022-12-15 | End: 2022-12-15

## 2022-12-15 RX ORDER — SODIUM CHLORIDE 0.9 % (FLUSH) 0.9 %
5-40 SYRINGE (ML) INJECTION PRN
Status: DISCONTINUED | OUTPATIENT
Start: 2022-12-15 | End: 2022-12-16 | Stop reason: HOSPADM

## 2022-12-15 RX ORDER — SODIUM CHLORIDE 0.9 % (FLUSH) 0.9 %
5-40 SYRINGE (ML) INJECTION EVERY 12 HOURS SCHEDULED
Status: DISCONTINUED | OUTPATIENT
Start: 2022-12-15 | End: 2022-12-16 | Stop reason: HOSPADM

## 2022-12-15 RX ORDER — LORAZEPAM 0.5 MG/1
0.5 TABLET ORAL
Status: DISCONTINUED | OUTPATIENT
Start: 2022-12-15 | End: 2022-12-16 | Stop reason: HOSPADM

## 2022-12-15 RX ORDER — ATORVASTATIN CALCIUM 80 MG/1
80 TABLET, FILM COATED ORAL NIGHTLY
Status: DISCONTINUED | OUTPATIENT
Start: 2022-12-15 | End: 2022-12-16 | Stop reason: HOSPADM

## 2022-12-15 RX ORDER — ASPIRIN 325 MG
325 TABLET ORAL ONCE
Status: DISCONTINUED | OUTPATIENT
Start: 2022-12-16 | End: 2022-12-16 | Stop reason: HOSPADM

## 2022-12-15 RX ORDER — ONDANSETRON 2 MG/ML
4 INJECTION INTRAMUSCULAR; INTRAVENOUS EVERY 6 HOURS PRN
Status: DISCONTINUED | OUTPATIENT
Start: 2022-12-15 | End: 2022-12-16 | Stop reason: HOSPADM

## 2022-12-15 RX ORDER — NITROGLYCERIN 0.4 MG/1
0.4 TABLET SUBLINGUAL EVERY 5 MIN PRN
Status: DISCONTINUED | OUTPATIENT
Start: 2022-12-15 | End: 2022-12-16 | Stop reason: HOSPADM

## 2022-12-15 RX ORDER — ENOXAPARIN SODIUM 100 MG/ML
30 INJECTION SUBCUTANEOUS 2 TIMES DAILY
Status: DISCONTINUED | OUTPATIENT
Start: 2022-12-15 | End: 2022-12-16 | Stop reason: HOSPADM

## 2022-12-15 RX ORDER — DEXTROSE MONOHYDRATE 100 MG/ML
INJECTION, SOLUTION INTRAVENOUS CONTINUOUS PRN
Status: DISCONTINUED | OUTPATIENT
Start: 2022-12-15 | End: 2022-12-16 | Stop reason: HOSPADM

## 2022-12-15 RX ORDER — 0.9 % SODIUM CHLORIDE 0.9 %
500 INTRAVENOUS SOLUTION INTRAVENOUS ONCE
Status: COMPLETED | OUTPATIENT
Start: 2022-12-15 | End: 2022-12-15

## 2022-12-15 RX ORDER — METOPROLOL SUCCINATE 25 MG/1
12.5 TABLET, EXTENDED RELEASE ORAL NIGHTLY
Status: DISCONTINUED | OUTPATIENT
Start: 2022-12-15 | End: 2022-12-16 | Stop reason: HOSPADM

## 2022-12-15 RX ORDER — SODIUM CHLORIDE 9 MG/ML
INJECTION, SOLUTION INTRAVENOUS PRN
Status: DISCONTINUED | OUTPATIENT
Start: 2022-12-15 | End: 2022-12-16 | Stop reason: HOSPADM

## 2022-12-15 RX ORDER — ACETAMINOPHEN 325 MG/1
650 TABLET ORAL EVERY 6 HOURS PRN
Status: DISCONTINUED | OUTPATIENT
Start: 2022-12-15 | End: 2022-12-16 | Stop reason: HOSPADM

## 2022-12-15 RX ORDER — SODIUM CHLORIDE 9 MG/ML
INJECTION, SOLUTION INTRAVENOUS CONTINUOUS
Status: DISCONTINUED | OUTPATIENT
Start: 2022-12-15 | End: 2022-12-15

## 2022-12-15 RX ORDER — ASPIRIN 81 MG/1
81 TABLET, CHEWABLE ORAL DAILY
Status: DISCONTINUED | OUTPATIENT
Start: 2022-12-16 | End: 2022-12-15

## 2022-12-15 RX ORDER — SODIUM CHLORIDE 9 MG/ML
INJECTION, SOLUTION INTRAVENOUS PRN
Status: DISCONTINUED | OUTPATIENT
Start: 2022-12-15 | End: 2022-12-15 | Stop reason: SDUPTHER

## 2022-12-15 RX ORDER — ONDANSETRON 2 MG/ML
4 INJECTION INTRAMUSCULAR; INTRAVENOUS ONCE
Status: COMPLETED | OUTPATIENT
Start: 2022-12-15 | End: 2022-12-15

## 2022-12-15 RX ORDER — INSULIN GLARGINE 100 [IU]/ML
30 INJECTION, SOLUTION SUBCUTANEOUS NIGHTLY
Status: DISCONTINUED | OUTPATIENT
Start: 2022-12-15 | End: 2022-12-16 | Stop reason: HOSPADM

## 2022-12-15 RX ADMIN — SODIUM CHLORIDE: 9 INJECTION, SOLUTION INTRAVENOUS at 12:04

## 2022-12-15 RX ADMIN — SODIUM CHLORIDE: 9 INJECTION, SOLUTION INTRAVENOUS at 05:19

## 2022-12-15 RX ADMIN — INSULIN GLARGINE 30 UNITS: 100 INJECTION, SOLUTION SUBCUTANEOUS at 22:02

## 2022-12-15 RX ADMIN — MORPHINE SULFATE 4 MG: 4 INJECTION, SOLUTION INTRAMUSCULAR; INTRAVENOUS at 04:36

## 2022-12-15 RX ADMIN — Medication 10 ML: at 22:12

## 2022-12-15 RX ADMIN — SODIUM CHLORIDE: 9 INJECTION, SOLUTION INTRAVENOUS at 23:57

## 2022-12-15 RX ADMIN — SODIUM CHLORIDE 500 ML: 9 INJECTION, SOLUTION INTRAVENOUS at 03:22

## 2022-12-15 RX ADMIN — ENOXAPARIN SODIUM 30 MG: 100 INJECTION SUBCUTANEOUS at 22:02

## 2022-12-15 RX ADMIN — ONDANSETRON 4 MG: 2 INJECTION INTRAMUSCULAR; INTRAVENOUS at 04:35

## 2022-12-15 RX ADMIN — ATORVASTATIN CALCIUM 80 MG: 80 TABLET, FILM COATED ORAL at 22:00

## 2022-12-15 RX ADMIN — METOPROLOL SUCCINATE 12.5 MG: 25 TABLET, EXTENDED RELEASE ORAL at 21:56

## 2022-12-15 RX ADMIN — ASPIRIN 325 MG: 325 TABLET ORAL at 04:35

## 2022-12-15 RX ADMIN — ENOXAPARIN SODIUM 30 MG: 100 INJECTION SUBCUTANEOUS at 09:27

## 2022-12-15 RX ADMIN — ACETAMINOPHEN 650 MG: 325 TABLET, FILM COATED ORAL at 22:00

## 2022-12-15 RX ADMIN — IOPAMIDOL 75 ML: 755 INJECTION, SOLUTION INTRAVENOUS at 11:53

## 2022-12-15 ASSESSMENT — PAIN SCALES - GENERAL
PAINLEVEL_OUTOF10: 0
PAINLEVEL_OUTOF10: 2
PAINLEVEL_OUTOF10: 0
PAINLEVEL_OUTOF10: 5

## 2022-12-15 ASSESSMENT — ENCOUNTER SYMPTOMS
CHEST TIGHTNESS: 1
SHORTNESS OF BREATH: 0
BACK PAIN: 0
NAUSEA: 1
RHINORRHEA: 0
PHOTOPHOBIA: 0
COLOR CHANGE: 0
WHEEZING: 0
VOMITING: 1

## 2022-12-15 ASSESSMENT — PAIN DESCRIPTION - DESCRIPTORS
DESCRIPTORS: DISCOMFORT
DESCRIPTORS: ACHING

## 2022-12-15 ASSESSMENT — PAIN DESCRIPTION - ORIENTATION: ORIENTATION: MID

## 2022-12-15 ASSESSMENT — LIFESTYLE VARIABLES
HOW MANY STANDARD DRINKS CONTAINING ALCOHOL DO YOU HAVE ON A TYPICAL DAY: PATIENT DOES NOT DRINK
HOW OFTEN DO YOU HAVE A DRINK CONTAINING ALCOHOL: NEVER

## 2022-12-15 ASSESSMENT — HEART SCORE: ECG: 1

## 2022-12-15 ASSESSMENT — PAIN - FUNCTIONAL ASSESSMENT: PAIN_FUNCTIONAL_ASSESSMENT: PREVENTS OR INTERFERES SOME ACTIVE ACTIVITIES AND ADLS

## 2022-12-15 ASSESSMENT — PAIN DESCRIPTION - LOCATION
LOCATION: HEAD
LOCATION: CHEST

## 2022-12-15 NOTE — ACP (ADVANCE CARE PLANNING)
Advance Care Planning     Advance Care Planning Activator (Inpatient)  Conversation Note      Date of ACP Conversation: 12/15/2022     Conversation Conducted with:  Shaheed Lyman  Patient with Decision Making Capacity    ACP Activator: [address] Decision Maker:     Current [facility] Decision Maker:     Primary Decision Maker: Geoffrey Vela - [phone]    Secondary Decision Maker: Dianne Bates - [phone]  Click here to complete Healthcare Decision Makers including section of the Healthcare Decision Maker Relationship (ie \"Primary\")      Care Preferences    Ventilation: \"If you were in your present state of health and suddenly became very ill and were unable to breathe on your own, what would your preference be about the use of a ventilator (breathing machine) if it were available to you? \"      Would the patient desire the use of ventilator (breathing machine)?: yes    \"If your health worsens and it becomes clear that your chance of recovery is unlikely, what would your preference be about the use of a ventilator (breathing machine) if it were available to you? \"     Would the patient desire the use of ventilator (breathing machine)?: Yes      Resuscitation  \"CPR works best to restart the heart when there is a sudden event, like a heart attack, in someone who is otherwise healthy. Unfortunately, CPR does not typically restart the heart for people who have serious health conditions or who are very sick. \"    \"In the event your heart stopped as a result of an underlying serious health condition, would you want attempts to be made to restart your heart (answer \"yes\" for attempt to resuscitate) or would you prefer a natural death (answer \"no\" for do not attempt to resuscitate)? \" yes       [] Yes   [] No   Educated Patient / Maggie Henderson regarding differences between Advance Directives and portable DNR orders.     Length of ACP Conversation in minutes: Conversation Outcomes:  [x] ACP discussion completed  [] Existing advance directive reviewed with patient; no changes to patient's previously recorded wishes  [] New Advance Directive completed  [] Portable Do Not Rescitate prepared for Provider review and signature  [] POLST/POST/MOLST/MOST prepared for Provider review and signature      Follow-up plan:    [] Schedule follow-up conversation to continue planning  [] Referred individual to Provider for additional questions/concerns   [] Advised patient/agent/surrogate to review completed ACP document and update if needed with changes in condition, patient preferences or care setting    [x] This note routed to one or more involved healthcare providers        Patient does not want to create Adv Directives; he reports his wife will make all his decisions if he is unable. Routed to Dr Юлия Welch.     Joshua Styles, [place]     Case Management   [phone]    12/15/2022  12:07 PM

## 2022-12-15 NOTE — CONSULTS
Cardiology Consultation   Date: 12/15/2022  Admit Date:  12/15/2022  Admission Diagnosis: Chest pain [R07.9]     Reason for Consultation: chest pain  Consult Requesting Physician: Silvia Doan MD       History of Present Illness  Hortencia Clayton is a 78y.o. year old male with past medical history significant for CAD/MI s/ p  ascending aortic aneurysm, SHF, crohn's dz, DM, small bowel rsection, pancreatitis, peritonitis    Adm [facility] w Chest pain. Sudden onset yesterday of feeling \"drained. \" He was sitting in a chair, not exerting himself. At this time he noticed mid sternal chest pressure and epigastric pain. 3/10 intensity. Associated with SOB. Felt like he was breathing through a straw and couldn't get air. He also reports episodes of nausea and diarrhea that started yesterday. He has a hx of crohn's but stated this diarrhea was significantly more than he typically has with a crohn's episode. Denies vomiting. His wife told him his abd was \"bloated. \"    SOB and chest discomfort continued on and off throughout the day. He tried to take a nap  When he woke up the SOB returned along with \"body feeling\" that didn't feel normal. He tried to go to bed and SOB worsened when he laid down. He decided to come to the ED. After his last heart cath, he was told he has arteries that were not fixed and he may have another heart attack at some point and he is very worried about that. In the ED, he received aspirin 325mg X1, MSO4 and zofran. He currently denies any CP- last felt a few hours ago. He is on 2L NC and says his SOB is slightly worse than his baseline (hx of COPD). He has had diarrhea X 3 in the last few hours and continues to feel like he needs to have another BM. He states he is compliant with all his medications. EKG without evidence of ischemia. Troponin X2 neg.      Past Medical History:   Diagnosis Date    Aneurysm of ascending aorta     CAD (coronary artery disease) 08/2017 unsure of exact reaction    Oxycodone-Acetaminophen Other (See Comments)     Pt \"doesn't feel right\" when taking. Other reaction(s): GI Upset  Pt \"doesn't feel right\" when taking. Percocet [Oxycodone-Acetaminophen]      Pt \"doesn't feel right\" when taking. Oxycodone Nausea And Vomiting       Social History:   reports that he quit smoking about 5 years ago. His smoking use included cigarettes. He has a 15.00 pack-year smoking history. He quit smokeless tobacco use about 5 years ago. He reports that he does not currently use alcohol. He reports that he does not use drugs. Family History:  family history includes Cancer in his mother; Diabetes in his mother. Review of Systems:  General: Denies fever, chills  Skin: Denies skin changes, rash, itching, lesions. HEENT: Denies headache, dizziness, vision changes, nosebleeds, sore throat, nasal drainage  RESP: Denies cough, sputum, wheeze, snoring  CARD: Denies palpitations,  murmur  GI:See HPI  : Denies frequency, pain, incontinence, polyuria  VASC: Denies claudication, leg cramps, clots  MUSC/SKEL: Denies pain, stiffness, arthritis  PSYCH: Denies anxiety, depression, stress  NEURO: Denies numbness, tingling, change in mood or memory  HEME: Denies abn bruising, bleeding, anemia  ENDO: Denies intolerance to heat, cold, excessive thirst or hunger, hx thyroid disease     Medications:  Prior to Admission medications    Medication Sig Start Date End Date Taking?  Authorizing Provider   BD PEN NEEDLE ALTHEA U/F 32G X 4 MM MISC USE 1 PEN NEEDLE IN THE  MORNING BEFORE BREAKFAST 11/22/22   Maddy Walker DO   zoster recombinant adjuvanted vaccine [facility]) 50 MCG/0.5ML SUSR injection Inject 0.5 mLs into the muscle See Admin Instructions 1 dose now and repeat in 2-6 months  Patient not taking: Reported on 12/15/2022 6/28/22 12/25/22  Maddy Walker DO   sildenafil (VIAGRA) 50 MG tablet Take 1 tablet by mouth as needed for Erectile Dysfunction 6/23/22   Adis Thibodeaux DO Varun   metoprolol succinate (TOPROL XL) 25 MG extended release tablet Take 0.5 tablets by mouth nightly 6/21/22   Jose Cruz Camarillo DO   FARXIGA 10 MG tablet TAKE 1 TABLET BY MOUTH IN  THE MORNING 5/9/22   Edwin Bond DO   glipiZIDE (GLUCOTROL XL) 5 MG extended release tablet TAKE 1 TABLET BY MOUTH  DAILY 4/25/22   Jose Cruz Camarillo DO   atorvastatin (LIPITOR) 40 MG tablet TAKE 1 TABLET BY MOUTH AT  NIGHT 4/25/22   Jose Cruz Camarillo DO   mesalamine (PENTASA) 500 MG extended release capsule TAKE 2 CAPSULES BY MOUTH  TWICE DAILY 4/4/22   Jose Cruz Camarillo DO   metFORMIN (GLUCOPHAGE) 500 MG tablet TAKE 1 TABLET BY MOUTH  TWICE DAILY WITH MEALS 4/4/22   Jose Cruz Camarillo DO   furosemide (LASIX) 20 MG tablet TAKE 1 TABLET BY MOUTH  DAILY 3/24/22   Jose Cruz Camarillo DO   insulin glargine (LANTUS SOLOSTAR) 100 UNIT/ML injection pen INJECT 58 UNITS  SUBCUTANEOUSLY NIGHTLY 12/28/21   Jose Cruz Camarillo DO   HYDROcodone-acetaminophen (NORCO)  MG per tablet Take 1 tablet by mouth every 6 hours as needed.     Historical Provider, MD   nitroGLYCERIN (NITROSTAT) 0.4 MG SL tablet Place 1 tablet under the tongue every 5 minutes as needed for Chest pain  Patient not taking: Reported on 12/15/2022 7/7/20   Jose Cruz Camarillo DO   glucose (GLUTOSE) 40 % GEL Take 37.5 mLs by mouth as needed (low blood sugar (less than 70)) 8/13/19   Silver Herron MD   inFLIXimab (REMICADE) 100 MG injection Infuse 5 mg/kg intravenously See Admin Instructions q 6-8 wks    Historical Provider, MD   aspirin 81 MG chewable tablet Take 1 tablet by mouth daily 8/15/17   Doris Oneil MD     Scheduled Meds:   aspirin        sodium chloride flush  5-40 mL IntraVENous 2 times per day    [START ON 12/16/2022] aspirin  81 mg Oral Daily    atorvastatin  80 mg Oral Nightly    metoprolol succinate  12.5 mg Oral Nightly    enoxaparin  30 mg SubCUTAneous BID    insulin lispro  0-4 Units SubCUTAneous TID WC    insulin lispro  0-4 Units SubCUTAneous Nightly    insulin glargine  30 Units SubCUTAneous Nightly      Continuous Infusions:   sodium chloride      dextrose       PRN Meds:.sodium chloride flush, sodium chloride, acetaminophen **OR** acetaminophen, ondansetron, nitroGLYCERIN, morphine, glucose, dextrose bolus **OR** dextrose bolus, glucagon (rDNA), dextrose     Physical Examination:  /77   Pulse 65   Temp 97.5 °F (36.4 °C) (Axillary)   Resp 17   Ht 6' 2\" (1.88 m)   Wt 257 lb 0.9 oz (116.6 kg)   SpO2 93%   BMI 33.00 kg/m²         Intake/Output Summary (Last 24 hours) at 12/15/2022 1434  Last data filed at 12/15/2022 1404  Gross per 24 hour   Intake 582.75 ml   Output --   Net 582.75 ml     I/O since adm:     WEIGHT:Admit Weight: 257 lb 0.9 oz (116.6 kg)         Today  Weight: 257 lb 0.9 oz (116.6 kg)   DRY WEIGHT:  Wt Readings from Last 3 Encounters:   12/15/22 257 lb 0.9 oz (116.6 kg)   22 256 lb (116.1 kg)   22 256 lb 6.4 oz (116.3 kg)         Temp  Av.9 °F (36.6 °C)  Min: 97.5 °F (36.4 °C)  Max: 98.4 °F (36.9 °C)  Pulse  Av  Min: 63  Max: 104  BP  Min: 119/77  Max: 175/93  SpO2  Av.9 %  Min: 88 %  Max: 96 %    Physical Exam:   GEN: Appears well, no acute distress  SKIN: Pink, warm, dry. Nails without clubbing. HEENT: PERRLA. Normocephalic, atraumatic. Neck supple. No adenopathy. LUNG: AP diameter normal. Decreased bilateral bases. No wheeze, rales, or ronchi. Respiratory effort increased. HEART: S1S2 A/R. No JVD. No carotid bruit. No murmur, rub or gallop. ABD: Sl firm,distended. +BS X 4 quads. No hepatomegaly. EXT: Radial and pedal pulses 2+ and symmetric. Without varicosities. No edema. MUSCSKEL: Good ROM X4 extremities. No deformity. NEURO: A/O X3. Calm and cooperative. Tele NSR HR 82    Labs: I have reviewed all labs below today.    CBC   Recent Labs     12/15/22  0311   WBC 6.2   HGB 15.3   HCT 47.1   MCV 84.8          Chemistry   Recent Labs     12/15/22  0311      K 3.9      CO2 20*   BUN 17 CREATININE 0.9       Glucose   Recent Labs     12/15/22  0311   GLUCOSE 192*       Liver   Lab Results   Component Value Date/Time    AST 14 12/15/2022 03:11 AM    ALT 20 12/15/2022 03:11 AM    LIPASE 40.0 12/15/2022 03:11 AM    AMYLASE 59 09/24/2010 05:10 AM    LABALBU 3.7 12/15/2022 03:11 AM    BILIDIR <0.2 12/14/2018 02:47 PM    BILITOT 0.6 12/15/2022 03:11 AM    ALKPHOS 102 12/15/2022 03:11 AM       PT/INR:   Lab Results   Component Value Date/Time    PROTIME 10.8 08/12/2017 08:59 AM    PROTIME 12.7 12/05/2011 04:18 PM    INR 0.96 08/12/2017 08:59 AM    INR 1.16 12/05/2011 04:18 PM     APTT:   Lab Results   Component Value Date/Time    APTT 31.0 08/12/2017 08:59 AM       Pro-BNP:    Lab Results   Component Value Date/Time    PROBNP 31 12/15/2022 03:11 AM    PROBNP 59 01/18/2018 02:54 PM    PROBNP 19 11/30/2017 04:03 PM     Parameters:   > 450 pg/mL under age 48  > 900 pg/mL ages 54-65  > 1800 pg/mL over age 76  ENZYMES:  Lab Results   Component Value Date/Time    TROPONINI <0.01 12/15/2022 11:37 AM    TROPONINI <0.01 12/15/2022 09:58 AM    TROPONINI <0.01 12/15/2022 05:04 AM       FASTING LIPID PANEL:  Lab Results   Component Value Date/Time    CHOL 112 07/14/2022 12:40 PM    HDL 46 07/14/2022 12:40 PM    LDLCALC 47 07/14/2022 12:40 PM    TRIG 97 07/14/2022 12:40 PM         Diagnostic and imaging results reviewed. ECG:  LHC 2017   LHC/PCI 8/14/2017 (Dr. Marco Sibley)  -100% proximal-mid RCA stenosis stented with a 3.5 x 38 and 4.0 x 16 mm overlapping Synergy stents  -70% stenosis in a large OM and 95% stenosis in a narrow-caliber diagonal branch  -LVEF 35% with global dysfunction but more severe inferior wall dysfunction  -LVEDP of 20 post procedure        Imaging:      Echo 8/14/2017  Normal LV size and systolic function. Estimated ejection fraction is 60%. No valvular abnormalities. Normal study. CTA pulmonary 8/12/2017  No acute pulmonary embolus detected.        Mild aneurysmal dilation of the ascending aorta, 4.1 cm. Coronary artery   calcifications are noted. 2 patchy areas of ground-glass airspace disease right lower lobe. Pneumonia   is suspected. 8.1 mm size nodule left lower lobe along the diaphragmatic pleura. This   could be within the lung parenchyma or involve the pleura. Follow-up is   recommended, see below. Chest CT 4/3/18  Mild aneurysmal dilatation of the ascending aorta measuring up   to 4 cm, stable      Echo 5/13/19  Overall left ventricular systolic function is mildly depressed . Ejection fraction is visually estimated to be 45 %. There is moderate hypokinesis of the basal to apical septal walls. The mitral valve normal in structure and function. Mild mitral regurgitation is present. The aortic root is mildly dilated. 3.5 cm  The ascending aorta is mildly dilated. Chest CT 11/10/21  There is mild dilatation of the ascending aorta measuring up to 3.8 cm,   unchanged dating back to 2017. NM Stress 11/29/2021  Summary  Normal stress myocardial perfusion. Overall findings represent a low risk scan. Poor exercise tolerance  Calculated EF is mildly reduced at 50%    CXR 12/15/2022  Impression   Clear chest without acute cardiopulmonary process. ABD CT: 12/15/2022  Impression   1. No acute abnormality. Assessment & Plan:  1.) Chest pain: Associated with SOB. GI vs angina. Abd CT neg. Discussed with Dr. Joe Patterson, will do LHC in AM 12/16/2022.    2.) CAD s/p SHAN to RCA, no intervention to L side coronaries: EKG and trop neg for ischemia. However, he has known severe CAD to OM and dig branch on LHC in 2017. Dr. Joe Patterson will proceed with C in AM.   LHC R/B/A discussed including heart attack, stroke, injury to artery, allergic reaction to dye, kidney injury, bleeding and infection. Patient wishes to proceed.    DAPT: asa  Beta Blocker: toprol XL  ACEi/ARB:none  Anti anginal:   Lipid management/high intensity statin: lipitor  Risk factor management: high blood pressure, high cholesterol, Diabetes, smoking, obesity, family hx  Lifestyle modification: Heart healthy diet, regular exercise, weight loss, smoking cessation, stress reduction    3.) Chronic systolic heart failure, ICM, basal/apical hypokinesis, LVEF 45%: Appears euvolemic, CXR without pulm edema, BNP WNL. No edema, JVD, wt gain.    NYHA Class II-III   Stage C  Diuretic: none (resume lasix after [facility] tomorrow- hold or now)  Cont toprol XL  Cont farxiga as OP (non formulary)    Cardiac Rehab for EF <35%: NA  2gm Na diet, daily weight, 64 oz fluid restriction  Avoid NSAIDS and other nephrotoxic meds  [address] Referral: OP    4.) [facility]    5.) Diarrhea: Hx crohns/R hemicolectomy  Abd CT neg  Defer further CLARK to primary team          The assessment and plan were discussed with     GISSEL Bhagat  [facility], [address] [place] [address], [address] [place] [address]  Phone: [phone]  Fax: [phone]    Electronically signed by GISSEL Orourke - CNP on 12/15/2022 at 2:34 PM

## 2022-12-15 NOTE — TELEPHONE ENCOUNTER
Mayra the nurse with [facility] called to let Dr Christiana Guevara know that the pt was admitted to the hospital for chest pain.  If any questions give Mayra a call [phone]

## 2022-12-15 NOTE — PROGRESS NOTES
Message left on cardiology consult line for Dr. Nadege Bustillo.  Electronically signed by Promise Andrea RN, [address] [place] [address] on 12/15/22 at 7:04 AM EST

## 2022-12-15 NOTE — ED TRIAGE NOTES
Pt arrived to ED with c/o chest pressure that has been on and off all day with nausea and vomiting. States he had an nstemi in 2017 and had stents placed. States he just doesn't feel right and it kind of feels similar to when he had the nstemi.

## 2022-12-15 NOTE — ED NOTES
Pt placed on 2L NC due to oxygen dropping to 88% on RA while asleep.       Wanda Ramos RN  12/15/22 [id number]

## 2022-12-15 NOTE — PROGRESS NOTES
4 Eyes Skin Assessment     NAME:  Staci Yun  YOB: 1955  MEDICAL RECORD NUMBER:  [id number]    The patient is being assessed for  Admission    I agree that One RN have performed a thorough Head to Toe Skin Assessment on the patient. ALL assessment sites listed below have been assessed. Areas assessed by both nurses:    Head, Face, Ears, Shoulders, Back, Chest, Arms, Elbows, Hands, Sacrum. Buttock, Coccyx, Ischium, and Legs. Feet and Heels        Does the Patient have a Wound?  No noted wound(s)       Vicente Prevention initiated by RN: NA   Wound Care Orders initiated by RN: NA    Pressure Injury (Stage 3,4, Unstageable, DTI, NWPT, and Complex wounds) if present place referral order by RN under : NA    New and Established Ostomies, if present place, referral order under : NA      Nurse 1 eSignature: Electronically signed by Lio Hsieh RN on 12/15/22 at 5:39 PM EST    **SHARE this note so that the co-signing nurse is able to place an eSignature**    Nurse 2 eSignature: {Esignature:[id number]}

## 2022-12-15 NOTE — H&P
Hospital Medicine History & Physical      PCP: Crissy Mak DO    Date of Admission: 12/15/2022    Date of Service: Pt seen/examined on 12/15/22 and Admitted to Inpatient     Chief Complaint:  CP    History Of Present Illness: The patient is a 78 y.o. male who presents to [facility] with CP. Pt states he has h/o CAD s/p stents placed, states he noticed CP, left sided, associated with SOB. No aggrevating and relieving factors. Also has h/o crohns, states has been having diarrhea since yesterday. Past Medical History:        Diagnosis Date    Aneurysm of ascending aorta     CAD (coronary artery disease) 08/2017    NSTEMI    Chronic diastolic CHF (congestive heart failure), NYHA class 2 ([facility])     Crohn's disease ([facility] [address]     Diabetes mellitus ([facility] [address]     Fistula of intestine     History of resection of small bowel     MRSA colonization 10/08/2020    Pancreatitis     Peritonitis [facility])        Past Surgical History:        Procedure Laterality Date    ABSCESS DRAINAGE  2/11/15    I and d of scrotal abscess    ABSCESS DRAINAGE Right 08/04/2017    right inner thigh    CARDIAC CATHETERIZATION  08/2017    SHAN x 2 to RCA; OM and Dg stenosis    CHOLECYSTECTOMY      DIAGNOSTIC CARDIAC CATH LAB PROCEDURE      SMALL INTESTINE SURGERY         Medications Prior to Admission:    Prior to Admission medications    Medication Sig Start Date End Date Taking?  Authorizing Provider   BD PEN NEEDLE ALTHEA U/F 32G X 4 MM MISC USE 1 PEN NEEDLE IN THE  MORNING BEFORE BREAKFAST 11/22/22   Crissy Mak DO   zoster recombinant adjuvanted vaccine [facility]) 50 MCG/0.5ML SUSR injection Inject 0.5 mLs into the muscle See Admin Instructions 1 dose now and repeat in 2-6 months  Patient not taking: Reported on 12/15/2022 6/28/22 12/25/22  Crissy Mak DO   sildenafil (VIAGRA) 50 MG tablet Take 1 tablet by mouth as needed for Erectile Dysfunction 6/23/22   Netta Carlton DO   metoprolol succinate (TOPROL XL) 25 MG extended release tablet Take 0.5 tablets by mouth nightly 6/21/22   Netta Carlton DO   FARXIGA 10 MG tablet TAKE 1 TABLET BY MOUTH IN  THE MORNING 5/9/22   Netta Carlton DO   glipiZIDE (GLUCOTROL XL) 5 MG extended release tablet TAKE 1 TABLET BY MOUTH  DAILY 4/25/22   Netta Carlton, DO   atorvastatin (LIPITOR) 40 MG tablet TAKE 1 TABLET BY MOUTH AT  NIGHT 4/25/22   Netta Carlton,    mesalamine (PENTASA) 500 MG extended release capsule TAKE 2 CAPSULES BY MOUTH  TWICE DAILY 4/4/22   Netta Carlton, DO   metFORMIN (GLUCOPHAGE) 500 MG tablet TAKE 1 TABLET BY MOUTH  TWICE DAILY WITH MEALS 4/4/22   Netta Carlton,    furosemide (LASIX) 20 MG tablet TAKE 1 TABLET BY MOUTH  DAILY 3/24/22   Netta Carlton DO   insulin glargine (LANTUS SOLOSTAR) 100 UNIT/ML injection pen INJECT 58 UNITS  SUBCUTANEOUSLY NIGHTLY 12/28/21   Netta Carlton DO   HYDROcodone-acetaminophen (NORCO)  MG per tablet Take 1 tablet by mouth every 6 hours as needed. Historical Provider, MD   nitroGLYCERIN (NITROSTAT) 0.4 MG SL tablet Place 1 tablet under the tongue every 5 minutes as needed for Chest pain  Patient not taking: Reported on 12/15/2022 7/7/20   Netta Carlton DO   glucose (GLUTOSE) 40 % GEL Take 37.5 mLs by mouth as needed (low blood sugar (less than 70)) 8/13/19   Lon Scherer MD   inFLIXimab (REMICADE) 100 MG injection Infuse 5 mg/kg intravenously See Admin Instructions q 6-8 wks    Historical Provider, MD   aspirin 81 MG chewable tablet Take 1 tablet by mouth daily 8/15/17   Bro Bowman MD       Allergies:  Codeine, Oxycodone-acetaminophen, Percocet [oxycodone-acetaminophen], and Oxycodone    Social History:  The patient currently lives at home    TOBACCO:   reports that he quit smoking about 5 years ago. His smoking use included cigarettes. He has a 15.00 pack-year smoking history.  He quit smokeless tobacco use about 5 years ago. ETOH:   reports that he does not currently use alcohol. Family History:  Reviewed in detail and negative for DM, Early CAD, Cancer, CVA. Positive as follows:        Problem Relation Age of Onset    Diabetes Mother     Cancer Mother        REVIEW OF SYSTEMS:   Positive for CP and as noted in the HPI. All other systems reviewed and negative. PHYSICAL EXAM:    /77   Pulse 65   Temp 97.5 °F (36.4 °C) (Axillary)   Resp 17   Ht 6' 2\" (1.88 m)   Wt 257 lb 0.9 oz (116.6 kg)   SpO2 93%   BMI 33.00 kg/m²     General appearance: No apparent distress appears stated age and cooperative. HEENT Normal cephalic, atraumatic without obvious deformity. Pupils equal, round, and reactive to light. Extra ocular muscles intact. Conjunctivae/corneas clear. Neck: Supple, No jugular venous distention/bruits. Trachea midline without thyromegaly or adenopathy with full range of motion. Lungs: Clear to auscultation, bilaterally without Rales/Wheezes/Rhonchi with good respiratory effort. Heart: Regular rate and rhythm with Normal S1/S2   Abdomen: Soft, non-tender or non-distended without rigidity or guarding and positive bowel sounds all four quadrants. Extremities: No clubbing, cyanosis, or edema bilaterally. Full range of motion without deformity and normal gait intact. Skin: Skin color, texture, turgor normal.  No rashes or lesions. Neurologic: Alert and oriented X 3, neurovascularly intact with sensory/motor intact upper extremities/lower extremities, bilaterally. Cranial nerves: II-XII intact, grossly non-focal.  Mental status: Alert, oriented, thought content appropriate.   Capillary Refill: Acceptable  < 3 seconds  Peripheral Pulses: +3 Easily felt, not easily obliterated with pressure      CXR:  I have reviewed the CXR with the following interpretation: clear    CBC   Recent Labs     12/15/22  0311   WBC 6.2   HGB 15.3   HCT 47.1         RENAL  Recent Labs 12/15/22  0311      K 3.9      CO2 20*   BUN 17   CREATININE 0.9     LFT'S  Recent Labs     12/15/22  0311   AST 14*   ALT 20   BILITOT 0.6   ALKPHOS 102     COAG  No results for input(s): INR in the last 72 hours. CARDIAC ENZYMES  Recent Labs     12/15/22  0504 12/15/22  0958 12/15/22  1137   TROPONINI <0.01 <0.01 <0.01       U/A:    Lab Results   Component Value Date/Time    COLORU YELLOW 10/07/2020 12:08 PM    WBCUA 1 12/05/2019 03:20 PM    RBCUA 2 12/05/2019 03:20 PM    CLARITYU Clear 10/07/2020 12:08 PM    SPECGRAV >1.030 10/07/2020 12:08 PM    LEUKOCYTESUR Negative 10/07/2020 12:08 PM    BLOODU Negative 10/07/2020 12:08 PM    GLUCOSEU >=1000 10/07/2020 12:08 PM    GLUCOSEU 100 07/14/2010 12:26 PM       ABG  No results found for: NPO5HTI, BEART, O0HBQNBK, PHART, THGBART, AVX2FHV, PO2ART, UTZ8WPY        Active Hospital Problems    Diagnosis Date Noted    Chest pain [R07.9]      Priority: High    Coronary artery disease involving native coronary artery of native heart with angina pectoris ([facility] [address] [I25.119] 01/03/2022    Uncontrolled type 2 diabetes mellitus with hyperglycemia ([facility] [address] [E11.65] 08/03/2021         PHYSICIANS CERTIFICATION:    I certify that Dodson Carrel is expected to be hospitalized for > than 2 midnights based on the following assessment and plan:      ASSESSMENT/PLAN:    CP - concern for ACS, trend trops. Cont home ASA/statin, cardio consulted. Planning for cath in am    Chr systolic chf - compnesated, dc IVF    HTN - controlled, cont home meds    DM II - controlled, cont SSI/lantus, will cut back lantus since pt is NPO      DVT Prophylaxis: lovenox  Diet: ADULT DIET; Regular; 4 carb choices (60 gm/meal);  Low Fat/Low Chol/High Fiber/VELASQUEZ  Diet NPO Exceptions are: Ice Chips, Sips of Water with Meds  Diet NPO Exceptions are: Sips of Water with Meds  Code Status: Full Code  PT/OT Eval Status: ordered    Belgica Kidd MD    Thank you Bro Combs, DO for the opportunity to be involved in this patient's care. If you have any questions or concerns please feel free to contact me at [phone].

## 2022-12-15 NOTE — CARE COORDINATION
Chart Reviewed. Met with patient to introduce  role, initiate discussion regarding dc planning and to complete ACP. ACP completed. INITIAL CASE MANAGEMENT ASSESSMENT    Reviewed chart, met with patient to assess possible discharge needs. Explained Case Management role/services. Living Situation: lives with wife and son in a house. He is very limited in his answers. He reports he was totally intendment in all his aDLs without use of any DME. ADLs: totally independent without use of DME     DME: none    PT/OT Recs: TBD     Active Services: none     Transportation: family     Medications: Mail in    PCP: Varun      HD/PD: none    PLAN/COMMENTS: Goal is home/   no needs.    Janak Horta     Case Management   [phone]    12/15/2022  12:10 PM

## 2022-12-15 NOTE — ED PROVIDER NOTES
[facility]  [facility]      Pt Name: Mady Mendoza  MRN: [id number]  [place] 1955  Date ofevaluation: 12/15/2022  Provider: Francisca Lim MD    CHIEF COMPLAINT       Chief Complaint   Patient presents with    Chest Pain     On and off all day; states has also had n/v all day as well; hx of MI 5 years ago and has stents placed; states more of chest pressure \" feels like something isn't right\"; bs 157         HISTORY OF PRESENT ILLNESS   (Location/Symptom, Timing/Onset,Context/Setting, Quality, Duration, Modifying Factors, Severity)  Note limiting factors. Mady Mendoza is a 78 y.o. male  who  has a past medical history of Aneurysm of ascending aorta, CAD (coronary artery disease), Chronic diastolic CHF (congestive heart failure), NYHA class 2 ([address] Crohn's disease ([address] Diabetes mellitus ([address] Fistula of intestine, History of resection of small bowel, MRSA colonization, Pancreatitis, and Peritonitis ([address] who presents to the emergency department for evaluation of chest discomfort. Patient reports intermittent episodes of chest discomfort has been ongoing for the past day. He reports has had nausea and vomiting. He describes as a pressure-like sensation. States he had a similar episode when he was admitted for an NSTEMI previously. Patient was noted to have blockages and was stented at that time. Patient denies taking thing for his symptoms. Denies fevers cough or shortness of breath. Denies pain or swelling to lower extremities. Patient most recently had a stress test in 2021 which was within normal limits. Previous catheterization  8/2017 demonstrated:   Cards consulted: C % with 70% stenosis large OM, 95% on diagonal, LVEF 35% with global dysfunction. HPI    NursingNotes were reviewed.     REVIEW OF SYSTEMS    (2-9 systems for level 4, 10 or more for level 5)     Review of Systems   Constitutional:  Negative for activity change, chills and fever. HENT:  Negative for congestion and rhinorrhea. Eyes:  Negative for photophobia and visual disturbance. Respiratory:  Positive for chest tightness. Negative for shortness of breath and wheezing. Cardiovascular:  Positive for chest pain. Negative for palpitations and leg swelling. Gastrointestinal:  Positive for nausea and vomiting. Endocrine: Negative for polydipsia and polyuria. Genitourinary:  Negative for difficulty urinating and frequency. Musculoskeletal:  Negative for back pain and gait problem. Skin:  Negative for color change and rash. Neurological:  Negative for light-headedness and headaches. Psychiatric/Behavioral:  Negative for confusion. The patient is not nervous/anxious. Except as noted above the remainder of the review of systems was reviewed and negative.        PAST MEDICAL HISTORY     Past Medical History:   Diagnosis Date    Aneurysm of ascending aorta     CAD (coronary artery disease) 08/2017    NSTEMI    Chronic diastolic CHF (congestive heart failure), NYHA class 2 (H[facility])     Crohn's disease ([facility] [address]     Diabetes mellitus ([facility] [address]     Fistula of intestine     History of resection of small bowel     MRSA colonization 10/08/2020    Pancreatitis     Peritonitis ([facility])          SURGICALHISTORY       Past Surgical History:   Procedure Laterality Date    ABSCESS DRAINAGE  2/11/15    I and d of scrotal abscess    ABSCESS DRAINAGE Right 08/04/2017    right inner thigh    CARDIAC CATHETERIZATION  08/2017    SHAN x 2 to RCA; OM and Dg stenosis    CHOLECYSTECTOMY      DIAGNOSTIC CARDIAC CATH LAB PROCEDURE      SMALL INTESTINE SURGERY           CURRENT MEDICATIONS       Previous Medications    ASPIRIN 81 MG CHEWABLE TABLET    Take 1 tablet by mouth daily    ATORVASTATIN (LIPITOR) 40 MG TABLET    TAKE 1 TABLET BY MOUTH AT  NIGHT    BD PEN NEEDLE ALTHEA U/F 32G X 4 MM MISC    USE 1 PEN NEEDLE IN THE  MORNING BEFORE BREAKFAST    FARXIGA 10 MG TABLET    TAKE 1 TABLET BY MOUTH IN  THE MORNING    FUROSEMIDE (LASIX) 20 MG TABLET    TAKE 1 TABLET BY MOUTH  DAILY    GLIPIZIDE (GLUCOTROL XL) 5 MG EXTENDED RELEASE TABLET    TAKE 1 TABLET BY MOUTH  DAILY    GLUCOSE (GLUTOSE) 40 % GEL    Take 37.5 mLs by mouth as needed (low blood sugar (less than 70))    HYDROCODONE-ACETAMINOPHEN (NORCO)  MG PER TABLET    Take 1 tablet by mouth every 6 hours as needed.     INFLIXIMAB (REMICADE) 100 MG INJECTION    Infuse 5 mg/kg intravenously See Admin Instructions q 6-8 wks    INSULIN GLARGINE (LANTUS SOLOSTAR) 100 UNIT/ML INJECTION PEN    INJECT 58 UNITS  SUBCUTANEOUSLY NIGHTLY    MESALAMINE (PENTASA) 500 MG EXTENDED RELEASE CAPSULE    TAKE 2 CAPSULES BY MOUTH  TWICE DAILY    METFORMIN (GLUCOPHAGE) 500 MG TABLET    TAKE 1 TABLET BY MOUTH  TWICE DAILY WITH MEALS    METOPROLOL SUCCINATE (TOPROL XL) 25 MG EXTENDED RELEASE TABLET    Take 0.5 tablets by mouth nightly    NITROGLYCERIN (NITROSTAT) 0.4 MG SL TABLET    Place 1 tablet under the tongue every 5 minutes as needed for Chest pain    SILDENAFIL (VIAGRA) 50 MG TABLET    Take 1 tablet by mouth as needed for Erectile Dysfunction    ZOSTER RECOMBINANT ADJUVANTED VACCINE (SHINGRIX) 50 MCG/0.5ML SUSR INJECTION    Inject 0.5 mLs into the muscle See Admin Instructions 1 dose now and repeat in 2-6 months            Codeine, Oxycodone-acetaminophen, Percocet [oxycodone-acetaminophen], and Oxycodone    FAMILY HISTORY       Family History   Problem Relation Age of Onset    Diabetes Mother     Cancer Mother           SOCIAL HISTORY       Social History     Socioeconomic History    Marital status:      Spouse name: None    Number of children: None    Years of education: None    Highest education level: None   Tobacco Use    Smoking status: Former     Packs/day: 1.50     Years: 10.00     Pack years: 15.00     Types: Cigarettes     Quit date: 2017     Years since quittin.3    Smokeless tobacco: Former     Quit date: 2017   Vaping Use    Vaping Use: Never used   Substance and Sexual Activity    Alcohol use: Not Currently     Comment: rarely    Drug use: No    Sexual activity: Not Currently     Social Determinants of Health     Physical Activity: Insufficiently Active    Days of Exercise per Week: 2 days    Minutes of Exercise per Session: 50 min       SCREENINGS    [place] Coma Scale  Eye Opening: Spontaneous  Best Verbal Response: Oriented  Best Motor Response: Obeys commands  [place] Coma Scale Score: 15        PHYSICAL EXAM    (up to 7 for level 4, 8 or more for level 5)     ED Triage Vitals [12/15/22 0257]   BP Temp Temp Source Heart Rate Resp SpO2 Height Weight   (!) 154/96 98.4 °F (36.9 °C) Oral (!) 104 21 95 % -- --       Physical Exam  Vitals reviewed. Constitutional:       General: He is not in acute distress. Appearance: He is well-developed. HENT:      Head: Normocephalic and atraumatic. Eyes:      Conjunctiva/sclera: Conjunctivae normal.   Neck:      Trachea: No tracheal deviation. Cardiovascular:      Rate and Rhythm: Normal rate and regular rhythm. Pulmonary:      Effort: Pulmonary effort is normal.      Breath sounds: Normal breath sounds. No wheezing or rales. Abdominal:      General: There is no distension. Palpations: Abdomen is soft. Tenderness: There is no abdominal tenderness. Musculoskeletal:         General: No deformity. Normal range of motion. Cervical back: Normal range of motion. Skin:     General: Skin is warm and dry. Capillary Refill: Capillary refill takes less than 2 seconds. Neurological:      Mental Status: He is alert and oriented to person, place, and time. RESULTS     EKG: All EKG's are interpreted by the Emergency Department Physician who either signs or Co-signsthis chart in the absence of a cardiologist.    EKG demonstrates sinus rhythm ventricular rate of 94 bpm.  OK interval and QTc interval within normal limits. Patient has normal axis.   There are no significant ST elevations or depressions EKG is nondiagnostic for ACS. Compared EKG from 11/17/2021 nonspecific ST changes in the inferior and high lateral leads have resolved. RADIOLOGY:   Non-plain filmimages such as CT, Ultrasound and MRI are read by the radiologist. Plain radiographic images are visualized and preliminarily interpreted by the emergency physician with the below findings:      Interpretation per the Radiologist below, if available at the time ofthis note:    XR CHEST PORTABLE   Final Result   Clear chest without acute cardiopulmonary process. ED BEDSIDE ULTRASOUND:   Performed by ED Physician - none    LABS:  Labs Reviewed   COMPREHENSIVE METABOLIC PANEL W/ REFLEX TO MG FOR LOW K - Abnormal; Notable for the following components:       Result Value    CO2 20 (*)     Glucose 192 (*)     Albumin/Globulin Ratio 1.0 (*)     AST 14 (*)     All other components within normal limits   BLOOD GAS, VENOUS - Abnormal; Notable for the following components:    pH, Shlomo 7.330 (*)     All other components within normal limits   COVID-19, RAPID   RAPID INFLUENZA A/B ANTIGENS   CBC WITH AUTO DIFFERENTIAL   LACTIC ACID   LIPASE   TROPONIN   BRAIN NATRIURETIC PEPTIDE       All other labs were within normal range or not returned as of this dictation. EMERGENCY DEPARTMENT COURSE and DIFFERENTIAL DIAGNOSIS/MDM:   Vitals:    Vitals:    12/15/22 0300 12/15/22 0330 12/15/22 0333 12/15/22 0345   BP: (!) 139/96  (!) 157/81 (!) 172/94   Pulse: 95  86 83   Resp: 22  17 15   Temp:       TempSrc:       SpO2: 93% (!) 88% 90% 94%       Patient was given thefollowing medications:  Medications   0.9 % sodium chloride bolus (500 mLs IntraVENous New Bag 12/15/22 0322)       ED COURSE & MEDICAL DECISION MAKING    Pertinent Labs & Imaging studies reviewed. (See chart for details)   -  Patient seen and evaluated in the emergency department. -  Triage and nursing notes reviewed and incorporated.   -  Old chart records reviewed and incorporated. -  Differential diagnosis includes: Differential Diagnosis: Acute Coronary Syndrome, Congestive Heart Failure, Thoracic Dissection, Pericarditis, Pericardial Effusion, Pulmonary Embolism, Pneumonia, Pneumothorax, Ischemic Bowel, Bowel Obstruction, PUD, GERD, Acute Cholecystitis, Pancreatitis, Hepatitis, Colitis, other    -  Work-up included:  See above  -  ED treatment included: See above  -  Results discussed with patient. 79-year-old male with an extensive cardiac history presents ED for evaluation of chest discomfort. States he denies having chest pain but states that symptoms feel similar to when he had previous NSTEMI and was found to have extensive coronary artery disease. on presentation patient to be tachycardic and borderline hypoxic. EKG nondiagnostic for ACS.      labs show no emergent laboratory maladies. Imaging studies show no acute cardiopulmonary disease. Patient feels he still having nausea and some discomfort on reevaluation was provided with Zofran and morphine. Given patient's history he has a heart score of 6 he will be admitted for further medical management. .  The patient is agreeable with plan of care and disposition. Is this patient to be included in the SEP-1 Core Measure due to severe sepsis or septic shock? No   Exclusion criteria - the patient is NOT to be included for SEP-1 Core Measure due to: Infection is not suspected      REASSESSMENT          CRITICAL CARE TIME   Total Critical Care time was 35 minutes, excluding separately reportable procedures. There was a high probability of clinically significant/life threatening deterioration in the patient's condition which required my urgent intervention. 35    CONSULTS:  None    PROCEDURES:  Unless otherwise noted below, none     Procedures    FINAL IMPRESSION      1.  Chest pain, unspecified type          DISPOSITION/PLAN   DISPOSITION        PATIENT REFERREDTO:  No follow-up provider specified.     DISCHARGEMEDICATIONS:  New Prescriptions    No medications on file          (Please note that portions of this note were completed with a voice recognition program.  Efforts were made to edit the dictations but occasionally words are mis-transcribed.)    Gianni Damico MD (electronically signed)  Attending Emergency Physician          Gianni Damico MD  12/15/22 2163

## 2022-12-15 NOTE — PROGRESS NOTES
Pt admitted to Rm 3268 from ED via stretcher. Wife at bedside. Pt admitted with chest pain. Denies any pain at present. Pt to BR with SBA. Passed B and B. Pt does have loose stools with h/o Chrohns. Telemetry placed. VS taken. O2 @ 2L/NC. Oriented to room and unit. Ice chips given. Call light in reach.

## 2022-12-15 NOTE — PLAN OF CARE
Problem: Discharge Planning  Goal: Discharge to home or other facility with appropriate resources  Outcome: Progressing  Flowsheets (Taken 12/15/2022 1108)  Discharge to home or other facility with appropriate resources: Refer to discharge planning if patient needs post-hospital services based on physician order or complex needs related to functional status, cognitive ability or social support system     Problem: Pain  Goal: Verbalizes/displays adequate comfort level or baseline comfort level  Outcome: Progressing     Problem: Safety - Adult  Goal: Free from fall injury  Outcome: Progressing

## 2022-12-16 VITALS
SYSTOLIC BLOOD PRESSURE: 128 MMHG | WEIGHT: 258.38 LBS | DIASTOLIC BLOOD PRESSURE: 82 MMHG | HEART RATE: 60 BPM | RESPIRATION RATE: 16 BRPM | BODY MASS INDEX: 33.16 KG/M2 | OXYGEN SATURATION: 95 % | HEIGHT: 74 IN | TEMPERATURE: 97.3 F

## 2022-12-16 LAB
ANION GAP SERPL CALCULATED.3IONS-SCNC: 8 MMOL/L (ref 3–16)
BUN BLDV-MCNC: 14 MG/DL (ref 7–20)
CALCIUM SERPL-MCNC: 8.3 MG/DL (ref 8.3–10.6)
CHLORIDE BLD-SCNC: 109 MMOL/L (ref 99–110)
CO2: 20 MMOL/L (ref 21–32)
CREAT SERPL-MCNC: 0.7 MG/DL (ref 0.8–1.3)
GFR SERPL CREATININE-BSD FRML MDRD: >60 ML/MIN/{1.73_M2}
GLUCOSE BLD-MCNC: 138 MG/DL (ref 70–99)
GLUCOSE BLD-MCNC: 143 MG/DL (ref 70–99)
GLUCOSE BLD-MCNC: 394 MG/DL (ref 70–99)
HCT VFR BLD CALC: 41.7 % (ref 40.5–52.5)
HEMOGLOBIN: 13.6 G/DL (ref 13.5–17.5)
MCH RBC QN AUTO: 27.9 PG (ref 26–34)
MCHC RBC AUTO-ENTMCNC: 32.6 G/DL (ref 31–36)
MCV RBC AUTO: 85.8 FL (ref 80–100)
PDW BLD-RTO: 15 % (ref 12.4–15.4)
PERFORMED ON: ABNORMAL
PERFORMED ON: ABNORMAL
PLATELET # BLD: 126 K/UL (ref 135–450)
PMV BLD AUTO: 8.2 FL (ref 5–10.5)
POTASSIUM SERPL-SCNC: 3.9 MMOL/L (ref 3.5–5.1)
RBC # BLD: 4.87 M/UL (ref 4.2–5.9)
SODIUM BLD-SCNC: 137 MMOL/L (ref 136–145)
WBC # BLD: 5 K/UL (ref 4–11)

## 2022-12-16 PROCEDURE — 36415 COLL VENOUS BLD VENIPUNCTURE: CPT

## 2022-12-16 PROCEDURE — 93454 CORONARY ARTERY ANGIO S&I: CPT

## 2022-12-16 PROCEDURE — B2111ZZ FLUOROSCOPY OF MULTIPLE CORONARY ARTERIES USING LOW OSMOLAR CONTRAST: ICD-10-PCS | Performed by: INTERNAL MEDICINE

## 2022-12-16 PROCEDURE — 4A023N7 MEASUREMENT OF CARDIAC SAMPLING AND PRESSURE, LEFT HEART, PERCUTANEOUS APPROACH: ICD-10-PCS | Performed by: INTERNAL MEDICINE

## 2022-12-16 PROCEDURE — 99232 SBSQ HOSP IP/OBS MODERATE 35: CPT | Performed by: NURSE PRACTITIONER

## 2022-12-16 PROCEDURE — 99153 MOD SED SAME PHYS/QHP EA: CPT

## 2022-12-16 PROCEDURE — 6370000000 HC RX 637 (ALT 250 FOR IP): Performed by: NURSE PRACTITIONER

## 2022-12-16 PROCEDURE — 6360000002 HC RX W HCPCS

## 2022-12-16 PROCEDURE — 2500000003 HC RX 250 WO HCPCS

## 2022-12-16 PROCEDURE — 94760 N-INVAS EAR/PLS OXIMETRY 1: CPT

## 2022-12-16 PROCEDURE — 99152 MOD SED SAME PHYS/QHP 5/>YRS: CPT

## 2022-12-16 PROCEDURE — 4A033BC MEASUREMENT OF ARTERIAL PRESSURE, CORONARY, PERCUTANEOUS APPROACH: ICD-10-PCS | Performed by: INTERNAL MEDICINE

## 2022-12-16 PROCEDURE — 6370000000 HC RX 637 (ALT 250 FOR IP)

## 2022-12-16 PROCEDURE — C1887 CATHETER, GUIDING: HCPCS

## 2022-12-16 PROCEDURE — 93571 IV DOP VEL&/PRESS C FLO 1ST: CPT

## 2022-12-16 PROCEDURE — C1894 INTRO/SHEATH, NON-LASER: HCPCS

## 2022-12-16 PROCEDURE — 85027 COMPLETE CBC AUTOMATED: CPT

## 2022-12-16 PROCEDURE — 2580000003 HC RX 258

## 2022-12-16 PROCEDURE — 93005 ELECTROCARDIOGRAM TRACING: CPT | Performed by: STUDENT IN AN ORGANIZED HEALTH CARE EDUCATION/TRAINING PROGRAM

## 2022-12-16 PROCEDURE — 6360000004 HC RX CONTRAST MEDICATION: Performed by: INTERNAL MEDICINE

## 2022-12-16 PROCEDURE — 2709999900 HC NON-CHARGEABLE SUPPLY

## 2022-12-16 PROCEDURE — 80048 BASIC METABOLIC PNL TOTAL CA: CPT

## 2022-12-16 PROCEDURE — C1769 GUIDE WIRE: HCPCS

## 2022-12-16 RX ORDER — RANOLAZINE 500 MG/1
500 TABLET, EXTENDED RELEASE ORAL 2 TIMES DAILY
Status: DISCONTINUED | OUTPATIENT
Start: 2022-12-16 | End: 2022-12-16 | Stop reason: HOSPADM

## 2022-12-16 RX ORDER — RANOLAZINE 1000 MG/1
1000 TABLET, EXTENDED RELEASE ORAL 2 TIMES DAILY
Qty: 60 TABLET | Refills: 3 | Status: SHIPPED | OUTPATIENT
Start: 2022-12-16

## 2022-12-16 RX ORDER — RANOLAZINE 500 MG/1
1000 TABLET, EXTENDED RELEASE ORAL 2 TIMES DAILY
Status: DISCONTINUED | OUTPATIENT
Start: 2022-12-16 | End: 2022-12-16

## 2022-12-16 RX ORDER — INSULIN LISPRO 100 [IU]/ML
10 INJECTION, SOLUTION INTRAVENOUS; SUBCUTANEOUS ONCE
Status: DISCONTINUED | OUTPATIENT
Start: 2022-12-16 | End: 2022-12-16 | Stop reason: HOSPADM

## 2022-12-16 RX ORDER — SODIUM CHLORIDE 0.9 % (FLUSH) 0.9 %
5-40 SYRINGE (ML) INJECTION EVERY 12 HOURS SCHEDULED
Status: DISCONTINUED | OUTPATIENT
Start: 2022-12-16 | End: 2022-12-16 | Stop reason: HOSPADM

## 2022-12-16 RX ORDER — SODIUM CHLORIDE 0.9 % (FLUSH) 0.9 %
5-40 SYRINGE (ML) INJECTION PRN
Status: DISCONTINUED | OUTPATIENT
Start: 2022-12-16 | End: 2022-12-16 | Stop reason: HOSPADM

## 2022-12-16 RX ORDER — ACETAMINOPHEN 325 MG/1
650 TABLET ORAL EVERY 4 HOURS PRN
Status: DISCONTINUED | OUTPATIENT
Start: 2022-12-16 | End: 2022-12-16 | Stop reason: SDUPTHER

## 2022-12-16 RX ORDER — SODIUM CHLORIDE 9 MG/ML
INJECTION, SOLUTION INTRAVENOUS PRN
Status: DISCONTINUED | OUTPATIENT
Start: 2022-12-16 | End: 2022-12-16 | Stop reason: HOSPADM

## 2022-12-16 RX ORDER — RANOLAZINE 500 MG/1
500 TABLET, EXTENDED RELEASE ORAL 2 TIMES DAILY
Qty: 60 TABLET | Refills: 3 | Status: SHIPPED | OUTPATIENT
Start: 2022-12-16

## 2022-12-16 RX ADMIN — IOPAMIDOL 80 ML: 755 INJECTION, SOLUTION INTRAVENOUS at 08:49

## 2022-12-16 RX ADMIN — RANOLAZINE 500 MG: 500 TABLET, FILM COATED, EXTENDED RELEASE ORAL at 13:07

## 2022-12-16 ASSESSMENT — PAIN SCALES - GENERAL: PAINLEVEL_OUTOF10: 0

## 2022-12-16 NOTE — PLAN OF CARE
Problem: Safety - Adult  Goal: Free from fall injury  12/16/2022 0102 by Jude Martinez RN  Outcome: Progressing  Note: Fall risk band on patient. Orange light on outside of room. Non skid footwear in place. Alarms used appropriately. Patient instructed to call and wait for staff before getting up. Rounding done to anticipate needs. Appropriate safety devices used for transfers.

## 2022-12-16 NOTE — PROGRESS NOTES
Patient returned to the floor at approximately 1230 from cath lab. Arm board in place to right arm patient has discharge instructions from the cath lab and also included on AVS. VSS no complaints or pain or discomfort noted at this time.  Electronically signed by Bruna Sena RN, CRRN on 12/16/22 at 1:20 PM EST

## 2022-12-16 NOTE — PROGRESS NOTES
[facility]   Daily Progress Note      Admit Date:  12/15/2022    CC: chest pain    HPI:   Frank Desai is a 78 y.o. male with PMH significant for CAD/MI s/ p  ascending aortic aneurysm, SHF, crohn's dz, DM, small bowel rsection, pancreatitis, peritonitis     Adm MHW w Chest pain. Sudden onset yesterday of feeling \"drained. \" He was sitting in a chair, not exerting himself. At this time he noticed mid sternal chest pressure and epigastric pain. 3/10 intensity. Associated with SOB. Felt like he was breathing through a straw and couldn't get air. He also reports episodes of nausea and diarrhea that started yesterday. He has a hx of crohn's but stated this diarrhea was significantly more than he typically has with a crohn's episode. Denies vomiting. His wife told him his abd was \"bloated. \"     SOB and chest discomfort continued on and off throughout the day. He tried to take a nap  When he woke up the SOB returned along with \"body feeling\" that didn't feel normal. He tried to go to bed and SOB worsened when he laid down. He decided to come to the ED. After his last heart cath, he was told he has arteries that were not fixed and he may have another heart attack at some point and he is very worried about that. In the ED, he received aspirin 325mg X1, MSO4 and zofran. He currently denies any CP- last felt a few hours ago. He is on 2L NC and says his SOB is slightly worse than his baseline (hx of COPD). He has had diarrhea X 3 in the last few hours and continues to feel like he needs to have another BM. He states he is compliant with all his medications. EKG without evidence of ischemia. Troponin X2 neg.     12/16/2022  Underwent LHC with Dr. Bela Evans. Found non obstructive CAD consistent with previous LHC. Today he reports that diarrhea, chest pain, nausea resolved last night. He now thinks he had food poisoning and that possibly caused his symptoms.      Review of Systems:   General: Denies fever, chills  Skin: Denies skin changes, rash, itching, lesions. HEENT: Denies headache, dizziness, vision changes, nosebleeds, sore throat, nasal drainage  RESP: Denies cough, sputum, dyspnea, wheeze, snoring  CARD: Denies palpitations,  murmur  GI:Denies nausea, vomiting, heartburn, loss of appetite, change in bowels  : Denies frequency, pain, incontinence, polyuria  VASC: Denies claudication, leg cramps, clots  MUSC/SKEL: Denies pain, stiffness, arthritis  PSYCH: Denies anxiety, depression, stress  NEURO: Denies numbness, tingling, weakness,change in mood or memory  HEME: Denies abn bruising, bleeding, anemia  ENDO: Denies intolerance to heat, cold, excessive thirst or hunger, hx thyroid disease    Objective:   /69   Pulse 60   Temp 97.5 °F (36.4 °C) (Oral)   Resp 18   Ht 6' 2\" (1.88 m)   Wt 258 lb 6.1 oz (117.2 kg)   SpO2 97%   BMI 33.17 kg/m²         Intake/Output Summary (Last 24 hours) at 12/16/2022 1205  Last data filed at 12/15/2022 1754  Gross per 24 hour   Intake 1062.75 ml   Output --   Net 1062.75 ml       WEIGHT:Admit Weight: 257 lb 0.9 oz (116.6 kg)         Today  Weight: 258 lb 6.1 oz (117.2 kg)   DRY WEIGHT:  Wt Readings from Last 3 Encounters:   12/16/22 258 lb 6.1 oz (117.2 kg)   09/23/22 256 lb (116.1 kg)   06/23/22 256 lb 6.4 oz (116.3 kg)       Physical Exam:  GEN: Appears well, obese, no acute distress  SKIN: Pink, warm, dry. HEENT: PERRLA. No adenopathy. LUNG: AP diameter normal. Clear bilateral. No wheeze, rales, or ronchi. Respiratory effort normal.  HEART: S1S2 A/R. No JVD. No carotid bruit. No murmur, rub or gallop. ABD: Soft, nontender. +BS X 4 quads. No hepatomegaly. EXT: Radial and pedal pulses 2+ and symmetric. Without varicosities. No edema. MUSCSKEL: Good ROM X4 extremities. No deformity. NEURO: A/O X3. Calm and cooperative.      Telemetry: NSR    Medications:    insulin lispro  10 Units SubCUTAneous Once    sodium chloride flush 5-40 mL IntraVENous 2 times per day    ranolazine  1,000 mg Oral BID    sodium chloride flush  5-40 mL IntraVENous 2 times per day    atorvastatin  80 mg Oral Nightly    metoprolol succinate  12.5 mg Oral Nightly    enoxaparin  30 mg SubCUTAneous BID    insulin lispro  0-4 Units SubCUTAneous TID WC    insulin lispro  0-4 Units SubCUTAneous Nightly    insulin glargine  30 Units SubCUTAneous Nightly    sodium chloride flush  5-40 mL IntraVENous 2 times per day    aspirin  325 mg Oral Once    [START ON 12/17/2022] aspirin  81 mg Oral Daily      sodium chloride      dextrose      sodium chloride      sodium chloride 75 mL/hr at 12/15/22 2357     sodium chloride flush, sodium chloride, sodium chloride flush, acetaminophen **OR** acetaminophen, ondansetron, nitroGLYCERIN, morphine, glucose, dextrose bolus **OR** dextrose bolus, glucagon (rDNA), dextrose, sodium chloride flush, sodium chloride, LORazepam    Lab Data: I have reviewed all labs below today.    CBC:   Recent Labs     12/15/22  0311 12/16/22  0530   WBC 6.2 5.0   HGB 15.3 13.6   HCT 47.1 41.7   MCV 84.8 85.8    126*     BMP:   Recent Labs     12/15/22  0311 12/16/22  0530    137   K 3.9 3.9    109   CO2 20* 20*   BUN 17 14   CREATININE 0.9 0.7*     GLUCOSE:   Recent Labs     12/15/22  0311 12/16/22  0530   GLUCOSE 192* 143*     LIVER PROFILE:   Lab Results   Component Value Date/Time    AST 14 12/15/2022 03:11 AM    ALT 20 12/15/2022 03:11 AM    LIPASE 40.0 12/15/2022 03:11 AM    AMYLASE 59 09/24/2010 05:10 AM    LABALBU 3.7 12/15/2022 03:11 AM    BILIDIR <0.2 12/14/2018 02:47 PM    BILITOT 0.6 12/15/2022 03:11 AM    ALKPHOS 102 12/15/2022 03:11 AM     PT/INR:   Lab Results   Component Value Date/Time    PROTIME 10.8 08/12/2017 08:59 AM    INR 0.96 08/12/2017 08:59 AM    INR 1.16 12/05/2011 04:18 PM     APTT:   Lab Results   Component Value Date/Time    APTT 31.0 08/12/2017 08:59 AM     Pro-BNP:    Lab Results   Component Value Date/Time PROBNP 31 12/15/2022 03:11 AM    PROBNP 59 01/18/2018 02:54 PM    PROBNP 19 11/30/2017 04:03 PM     Parameters:   > 450 pg/mL under age 48  > 900 pg/mL ages 54-65  > 1800 pg/mL over age 76    ENZYMES:  Lab Results   Component Value Date/Time    TROPONINI <0.01 12/15/2022 11:37 AM    TROPONINI <0.01 12/15/2022 09:58 AM    TROPONINI <0.01 12/15/2022 05:04 AM     FASTING LIPID PANEL:  Lab Results   Component Value Date/Time    CHOL 112 07/14/2022 12:40 PM    HDL 46 07/14/2022 12:40 PM    LDLCALC 47 07/14/2022 12:40 PM    TRIG 97 07/14/2022 12:40 PM       Diagnostics:    ECG:  LHC 2017   LHC/PCI 8/14/2017 (Dr. Bimal Parks)  -100% proximal-mid RCA stenosis stented with a 3.5 x 38 and 4.0 x 16 mm overlapping Synergy stents  -70% stenosis in a large OM and 95% stenosis in a narrow-caliber diagonal branch  -LVEF 35% with global dysfunction but more severe inferior wall dysfunction  -LVEDP of 20 post procedure        Imaging:      Echo 8/14/2017  Normal LV size and systolic function. Estimated ejection fraction is 60%. No valvular abnormalities. Normal study. CTA pulmonary 8/12/2017  No acute pulmonary embolus detected. Mild aneurysmal dilation of the ascending aorta, 4.1 cm. Coronary artery   calcifications are noted. 2 patchy areas of ground-glass airspace disease right lower lobe. Pneumonia   is suspected. 8.1 mm size nodule left lower lobe along the diaphragmatic pleura. This   could be within the lung parenchyma or involve the pleura. Follow-up is   recommended, see below. Chest CT 4/3/18  Mild aneurysmal dilatation of the ascending aorta measuring up   to 4 cm, stable      Echo 5/13/19  Overall left ventricular systolic function is mildly depressed . Ejection fraction is visually estimated to be 45 %. There is moderate hypokinesis of the basal to apical septal walls. The mitral valve normal in structure and function. Mild mitral regurgitation is present. The aortic root is mildly dilated. 3.5 cm  The ascending aorta is mildly dilated. Chest CT 11/10/21  There is mild dilatation of the ascending aorta measuring up to 3.8 cm,   unchanged dating back to 2017. NM Stress 11/29/2021  Summary  Normal stress myocardial perfusion. Overall findings represent a low risk scan. Poor exercise tolerance  Calculated EF is mildly reduced at 50%     CXR 12/15/2022  Impression   Clear chest without acute cardiopulmonary process. ABD CT: 12/15/2022  Impression   1. No acute abnormality. [facility] 12/16/2022:  Per Mary, CAD non obstructive, stable. Assessment & Plan:  1.) Chest pain: Associated with SOB. Now resolved. Suspect non cardiac and GI related vs stable angina. Adding ranexa      2.) CAD s/p SHAN to RCA, no intervention to L side coronaries: EKG and trop neg for ischemia. Known severe CAD to OM and dig branch on [facility] in 2017. Repeat [facility] 12/16/2022 with stable non obstructive CAD. CP may be stable angina-  starting ranexa. DAPT: asa  Beta Blocker: toprol XL  ACEi/ARB:none  Anti anginal: ranexa  Lipid management/high intensity statin: lipitor  Risk factor management: high blood pressure, high cholesterol, Diabetes, smoking, obesity, family hx  Lifestyle modification: Heart healthy diet, regular exercise, weight loss, smoking cessation, stress reduction     3.) Chronic systolic heart failure, ICM, basal/apical hypokinesis, LVEF 45%: Appears euvolemic, CXR without pulm edema, BNP WNL. No edema, JVD, wt gain.    NYHA Class II-III           Stage C  Diuretic: none (resume lasix after [facility] tomorrow- hold or now)  Cont toprol XL  Cont farxiga as OP (non formulary)     Cardiac Rehab for EF <35%: NA  2gm Na diet, daily weight, 64 oz fluid restriction  Avoid NSAIDS and other nephrotoxic meds  [facility] Referral: OP     4.) Ascending aortic aneurysm:   Chst CT 11/2021 3.8cm     5.) Diarrhea: Hx crohns/R hemicolectomy  Abd CT neg, possible food poisoning  Defer further CLARK to primary team 6.) DM:   Hold metformin for 48 hrs post C    Patient is stable from CV standpoint and will sign off.   FU 1 week MHI.        Electronically signed by GISSEL Otero CNP on 12/16/2022 at 12:05 PM

## 2022-12-16 NOTE — CARE COORDINATION
CASE MANAGEMENT DISCHARGE SUMMARY: No discharge needs.     DISCHARGE DATE: 12/16/22    DISCHARGED TO HOME     TRANSPORTATION: Family  Please call SW if needs arise [phone]           Electronically signed by ARACELI Lundy on 12/16/2022 at 11:11 AM

## 2022-12-16 NOTE — PLAN OF CARE
Problem: Discharge Planning  Goal: Discharge to home or other facility with appropriate resources  12/16/2022 1321 by Kerry Boss RN  Outcome: Progressing  12/16/2022 0102 by Juan Kam RN  Outcome: Progressing     Problem: Pain  Goal: Verbalizes/displays adequate comfort level or baseline comfort level  12/16/2022 1321 by Kerry Boss RN  Outcome: Progressing  12/16/2022 0102 by Juan Kam RN  Outcome: Progressing     Problem: Safety - Adult  Goal: Free from fall injury  12/16/2022 1321 by Kerry Boss RN  Outcome: Progressing  12/16/2022 0102 by Juan Kam RN  Outcome: Progressing  Note: Fall risk band on patient. Orange light on outside of room. Non skid footwear in place. Alarms used appropriately. Patient instructed to call and wait for staff before getting up. Rounding done to anticipate needs. Appropriate safety devices used for transfers.

## 2022-12-16 NOTE — PROGRESS NOTES
Patient admitted with chest pain. A/Ox4. Patient up ad mani. Mobility restrictions: None. On Reg, 4 Carb diet, tolerating well. Medications taken whole with thin liquids. On Lovenox for DVT prophylaxis. Oxygen: RA  LDA: RFA. Has been continent of bowel and bladder. LBM 12/15. Chair/bed alarms in use and call light in reach. Will monitor for safety.

## 2022-12-16 NOTE — DISCHARGE INSTRUCTIONS
Hold metformin for 48 hours after heart cath (restart Sim, Dec 18)        CARDIAC ANGIOGRAM (LEFT HEART CATH) - [address] of your puncture site:  Remove clear bandage 24 hours after the procedure. 2022 12:00  May shower in 24 hours  Inspect the site daily and gently clean using soap and water. Dry thoroughly and apply a Band-Aid. Normal Observations:  Soreness or tenderness which may last one week. Mild oozing from the incision site. Possible bruising that could last 2 weeks. Activity:  You may resume driving 24 hours following the procedure. You may resume normal activity in 3 days or after the wound heals. Avoid lifting more than 10 pounds for 3 days with affected arm. Do not make important / legal decisions within 24 hours after procedure. Nutrition:  Regular diet or previous diet. Drink at least 8 to 10 glasses of decaffeinated, non-alcoholic fluid for the next 24 hours to flush the x-ray dye used for your angiogram out of your body. Call your doctor immediately if your condition worsens, for any other concerns, for a follow-up appointment or if you experience any of the followin066 [id number]  Significant bleeding that does not stop after 10 minutes of applying firm pressure on the puncture site. Increased swelling of the wrist.  Unusual pain, numbness, or tingling of the wrist/arm. Any signs of infection such as: redness, yellow drainage at the site, swelling or pain. IF experiencing any recurrent chest pain, arm or jaw pain, shortness of breath,sweating,nausea & vomiting, lighteheadedness or sudden weak.

## 2022-12-16 NOTE — PROGRESS NOTES
Patient has been discharged per MD orders. Patient verbalizes understanding of all instructions, medications, and follow up. IV has been removed, catheter intact, patient tolerated well. All belongings have been gathered and packed. Family at bedside to transport patient home.  Electronically signed by Latisha Rice RN, [address] [place] [address] on 12/16/22 at 2:04 PM EST

## 2022-12-17 LAB
EKG ATRIAL RATE: 59 BPM
EKG DIAGNOSIS: NORMAL
EKG P AXIS: 46 DEGREES
EKG P-R INTERVAL: 194 MS
EKG Q-T INTERVAL: 392 MS
EKG QRS DURATION: 96 MS
EKG QTC CALCULATION (BAZETT): 388 MS
EKG R AXIS: 28 DEGREES
EKG T AXIS: 17 DEGREES
EKG VENTRICULAR RATE: 59 BPM

## 2022-12-17 PROCEDURE — 93010 ELECTROCARDIOGRAM REPORT: CPT | Performed by: INTERNAL MEDICINE

## 2022-12-19 ENCOUNTER — TELEPHONE (OUTPATIENT)
Dept: CARDIOLOGY CLINIC | Age: 67
End: 2022-12-19

## 2022-12-19 ENCOUNTER — CARE COORDINATION (OUTPATIENT)
Dept: CASE MANAGEMENT | Age: 67
End: 2022-12-19

## 2022-12-19 DIAGNOSIS — I21.4 NSTEMI (NON-ST ELEVATED MYOCARDIAL INFARCTION) (HCC): Primary | ICD-10-CM

## 2022-12-19 PROCEDURE — 1111F DSCHRG MED/CURRENT MED MERGE: CPT | Performed by: FAMILY MEDICINE

## 2022-12-19 NOTE — TELEPHONE ENCOUNTER
Spoke with wife oLis Mata. They are fine with the appt. Scheduled. Offered with Christel Vega NP or Dr. Antoine Coleman. She declined. Instructed to contact office with any q/c. She vu.

## 2022-12-19 NOTE — TELEPHONE ENCOUNTER
Adán's wife Loc Desai called in this morning, they are unable to keep the hospital follow up with CHALO Horner on 12/22 she states that week doesn't work for their schedule. I rescheduled him for next available on 1/16 she would like to know if that is going to be ok for it to be out that far. She can be reached at [phone].

## 2022-12-19 NOTE — CARE COORDINATION
[facility] Care Transitions Initial Follow Up Call    Call within 2 business days of discharge: Yes    LPN Care Coordinator contacted the family by telephone to perform post hospital discharge assessment. Verified name and  with family as identifiers. Provided introduction to self, and explanation of the LPN Care Coordinator role. Patient: Nitza Bean Patient : 1955   MRN: <[id number]>  Reason for Admission: CP  Discharge Date: 22 RARS: Readmission Risk Score: 7.6      Last Discharge  Street       Date Complaint Diagnosis Description Type Department Provider    12/15/22 Chest Pain Chest pain, unspecified type ED to Hosp-Admission (Discharged) (ADMITTED) [facility] Lin Ku MD; Mo Roberts .. Was this an external facility discharge? No Discharge Facility: NA    Challenges to be reviewed by the provider   Additional needs identified to be addressed with provider: No  none               Method of communication with provider: none. LPN CC spoke with patient's wife, Jimena Liu, HIPAA verified. States patient is good. Denies CP, palpitations, cough, edema. States he does have some SOB. Patient does not do daily weights, BP monitoring, or glucose monitoring. Is not on FR and wife states he would likely be noncompliant with this. Discussed importance of daily weights, and 3/5 rule. Discussed the role this can play in early detection of fluid overload & in reducing admissions. Discussed importance of glucose monitoring and insulin use. Appetite good. Denies problems with bowels or bladder. Full medication reconciliation and 1111f completed. Cardio f/u rescheduled from  to  d/t patient scheduling conflict. Denies needs. Akila Urbina  [facility]  Care Transitions  [phone]    [facility] Care Coordinator reviewed discharge instructions, medical action plan, and red flags with family who verbalized understanding.  The family was given an opportunity to ask questions and does not have any further questions or concerns at this time. Were discharge instructions available to patient? Yes. Reviewed appropriate site of care based on symptoms and resources available to patient including: PCP  Specialist  Urgent care clinics  When to call 911. The family agrees to contact the PCP office for questions related to their healthcare. Advance Care Planning:   Does patient have an Advance Directive: not on file. Medication reconciliation was performed with family, who verbalizes understanding of administration of home medications. Medications reviewed, 1111F entered: yes    Was patient discharged with a pulse oximeter? no    Non-face-to-face services provided:  Obtained and reviewed discharge summary and/or continuity of care documents  Education of patient/family/caregiver/guardian to support self-management-daily weight, BP monitoring, FR, glucose monitoring  Assessment and support for treatment adherence and medication management-full medication reconciliation completed    Offered patient enrollment in the Remote Patient Monitoring (RPM) program for in-home monitoring:  did not offer this encounter, patient is currently not compliant with daily weights, BP monitoring, or glucose monitoring. Not sure if he is a good candidate . Care Transitions 24 Hour Call    Do you have a copy of your discharge instructions?: Yes  Do you have all of your prescriptions and are they filled?: Yes  Have you been contacted by a [address]?: Yes  Have you scheduled your follow up appointment?: Yes  How are you going to get to your appointment?: Car - family or friend to transport  Do you feel like you have everything you need to keep you well at home?: Yes  Care Transitions Interventions         Follow Up  Future Appointments   Date Time Provider Negin Dodge   1/16/2023  1:20 PM Siri Zuniga APRN - CNP [facility] provided contact information.   Plan for follow-up call in 5-7 days based on severity of symptoms and risk factors. Plan for next call: symptom management-SOB, CP, cough  self management-weight, glucose, BP  follow-up appointment-cardio 1/16  medication management-new or changed?   RPM (candidacy?) ACP    Mayra Topete LPN

## 2022-12-20 NOTE — PRE SEDATION
Sedation Pre-Procedure Note    Patient Name: Mady Mendoza   YOB: 1955  Room/Bed: G5D-3468/3268-01  Medical Record Number: [id number]  Date: 12/20/2022   Time: 1:11 PM       Indication:  unstable angina     Consent: I have discussed with the patient and/or the patient representative the indication, alternatives, and the possible risks and/or complications of the planned procedure and the anesthesia methods. The patient and/or patient representative appear to understand and agree to proceed. Vital Signs:   Vitals:    12/16/22 1238   BP: 128/82   Pulse: 60   Resp: 16   Temp: 97.3 °F (36.3 °C)   SpO2: 95%       Past Medical History:   has a past medical history of Aneurysm of ascending aorta, CAD (coronary artery disease), Chronic diastolic CHF (congestive heart failure), NYHA class 2 ([facility] Crohn's disease ([facility] Diabetes mellitus ([facility] [address] [facility] Fistula of intestine, History of resection of small bowel, MRSA colonization, Pancreatitis, and Peritonitis ([facility] Past Surgical History:   has a past surgical history that includes Cholecystectomy; Small intestine surgery; Abscess Drainage (2/11/15); Abscess Drainage (Right, 08/04/2017); Cardiac catheterization (08/2017); and Diagnostic Cardiac Cath Lab Procedure. Medications:   Scheduled Meds:   Continuous Infusions:   PRN Meds:   Home Meds:   Prior to Admission medications    Medication Sig Start Date End Date Taking?  Authorizing Provider   ranolazine (RANEXA) 1000 MG extended release tablet Take 1 tablet by mouth 2 times daily 12/16/22  Yes GISSEL Lucio CNP   ranolazine (RANEXA) 500 MG extended release tablet Take 1 tablet by mouth 2 times daily 12/16/22  Yes GISSEL Horn CNP   BD PEN NEEDLE ALTHEA U/F 32G X 4 MM MISC USE 1 PEN NEEDLE IN THE  MORNING BEFORE BREAKFAST 11/22/22   Doreen Han, DO   zoster recombinant adjuvanted vaccine [facility]) 50 MCG/0.5ML SUSR injection Inject 0.5 mLs into the muscle See Admin Instructions 1 dose now and repeat in 2-6 months 6/28/22 12/25/22  Chastity Dia DO   sildenafil (VIAGRA) 50 MG tablet Take 1 tablet by mouth as needed for Erectile Dysfunction 6/23/22   Chastity Dia DO   metoprolol succinate (TOPROL XL) 25 MG extended release tablet Take 0.5 tablets by mouth nightly 6/21/22   Chastity Dia DO   FARXIGA 10 MG tablet TAKE 1 TABLET BY MOUTH IN  THE MORNING 5/9/22   Chastity Dia DO   glipiZIDE (GLUCOTROL XL) 5 MG extended release tablet TAKE 1 TABLET BY MOUTH  DAILY 4/25/22   Chastity Dia DO   atorvastatin (LIPITOR) 40 MG tablet TAKE 1 TABLET BY MOUTH AT  NIGHT 4/25/22   Chastity Dia DO   mesalamine (PENTASA) 500 MG extended release capsule TAKE 2 CAPSULES BY MOUTH  TWICE DAILY 4/4/22   Chastity Dia DO   metFORMIN (GLUCOPHAGE) 500 MG tablet TAKE 1 TABLET BY MOUTH  TWICE DAILY WITH MEALS 4/4/22   Chastity Dia DO   furosemide (LASIX) 20 MG tablet TAKE 1 TABLET BY MOUTH  DAILY 3/24/22   Chastity Dia DO   insulin glargine (LANTUS SOLOSTAR) 100 UNIT/ML injection pen INJECT 58 UNITS  SUBCUTANEOUSLY NIGHTLY 12/28/21   Chastity Dia DO   HYDROcodone-acetaminophen (NORCO)  MG per tablet Take 1 tablet by mouth every 6 hours as needed.     Historical Provider, MD   nitroGLYCERIN (NITROSTAT) 0.4 MG SL tablet Place 1 tablet under the tongue every 5 minutes as needed for Chest pain 7/7/20   Chastity Dia DO   glucose (GLUTOSE) 40 % GEL Take 37.5 mLs by mouth as needed (low blood sugar (less than 70)) 8/13/19   Maira Archer MD   inFLIXimab (REMICADE) 100 MG injection Infuse 5 mg/kg intravenously See Admin Instructions q 6-8 wks    Historical Provider, MD   aspirin 81 MG chewable tablet Take 1 tablet by mouth daily 8/15/17   Blas Gao MD     Coumadin Use Last 7 Days:  no  Antiplatelet drug therapy use last 7 days: yes -   Other anticoagulant use last 7 days: no  Additional Medication Information:  n/a      Pre-Sedation Documentation and Exam:   I have personally completed a history, physical exam & review of systems for this patient (see notes).     Mallampati Airway Assessment:  Mallampati Class I - (soft palate, fauces, uvula & anterior/posterior tonsillar pillars are visible)    Prior History of Anesthesia Complications:   none    ASA Classification:  Class 3 - A patient with severe systemic disease that limits activity but is not incapacitating    Sedation/ Anesthesia Plan:   intravenous sedation    Medications Planned:   midazolam (Versed) intravenously    Patient is an appropriate candidate for plan of sedation: yes    Electronically signed by Eli Coffey MD on 12/20/2022 at 1:11 PM

## 2022-12-20 NOTE — OP NOTE
[address]   Procedure Note    CLINICAL HISTORY:       Joni Lockett is a 78 y.o. male with a history of coronary artery disease. He was admitted with complaints of chest pain. Cardiac markers were negative. EKG showed nonspecific ST-T wave abnormality. Patient Active Problem List   Diagnosis    Crohn's disease ([facility] [place] [address]    Perirectal abscess    Abscess of right thigh    NSTEMI (non-ST elevated myocardial infarction) ([facility] [place] [address]    Mixed hyperlipidemia    Obesity (BMI 30-39. 9)    Tobacco abuse    Chest pain    Ischemic cardiomyopathy    Ascending aortic aneurysm    Shortness of breath    Cellulitis    Uncontrolled type 2 diabetes mellitus with hyperglycemia (HCC)    Coronary artery disease involving native coronary artery of native heart with angina pectoris ([facility] [place] [address]       No current facility-administered medications for this encounter.      Current Outpatient Medications   Medication Sig Dispense Refill    ranolazine (RANEXA) 1000 MG extended release tablet Take 1 tablet by mouth 2 times daily 60 tablet 3    ranolazine (RANEXA) 500 MG extended release tablet Take 1 tablet by mouth 2 times daily 60 tablet 3    BD PEN NEEDLE ALTHEA U/F 32G X 4 MM MISC USE 1 PEN NEEDLE IN THE  MORNING BEFORE BREAKFAST 100 each 5    zoster recombinant adjuvanted vaccine (SHINGRIX) 50 MCG/0.5ML SUSR injection Inject 0.5 mLs into the muscle See Admin Instructions 1 dose now and repeat in 2-6 months 0.5 mL 0    sildenafil (VIAGRA) 50 MG tablet Take 1 tablet by mouth as needed for Erectile Dysfunction 30 tablet 0    metoprolol succinate (TOPROL XL) 25 MG extended release tablet Take 0.5 tablets by mouth nightly 90 tablet 3    FARXIGA 10 MG tablet TAKE 1 TABLET BY MOUTH IN  THE MORNING 90 tablet 3    glipiZIDE (GLUCOTROL XL) 5 MG extended release tablet TAKE 1 TABLET BY MOUTH  DAILY 90 tablet 3    atorvastatin (LIPITOR) 40 MG tablet TAKE 1 TABLET BY MOUTH AT  NIGHT 90 tablet 3    mesalamine (PENTASA) 500 MG extended release capsule TAKE 2 CAPSULES BY MOUTH  TWICE DAILY 360 capsule 3    metFORMIN (GLUCOPHAGE) 500 MG tablet TAKE 1 TABLET BY MOUTH  TWICE DAILY WITH MEALS 180 tablet 3    furosemide (LASIX) 20 MG tablet TAKE 1 TABLET BY MOUTH  DAILY 90 tablet 3    insulin glargine (LANTUS SOLOSTAR) 100 UNIT/ML injection pen INJECT 58 UNITS  SUBCUTANEOUSLY NIGHTLY 45 mL 3    HYDROcodone-acetaminophen (NORCO)  MG per tablet Take 1 tablet by mouth every 6 hours as needed. nitroGLYCERIN (NITROSTAT) 0.4 MG SL tablet Place 1 tablet under the tongue every 5 minutes as needed for Chest pain 25 tablet 3    glucose (GLUTOSE) 40 % GEL Take 37.5 mLs by mouth as needed (low blood sugar (less than 70)) 45 g 1    inFLIXimab (REMICADE) 100 MG injection Infuse 5 mg/kg intravenously See Admin Instructions q 6-8 wks      aspirin 81 MG chewable tablet Take 1 tablet by mouth daily 30 tablet 3         The risks, benefits, and details of the procedure were explained to the patient. The patient verbalized understanding and wanted to proceed. Informed written consent was obtained. INDICATION:  Unstable angina     PROCEDURES PERFORMED:   Coronary angiogram   FFR     PROCEDURE TECHNIQUE:  The patient was approached from the right radial artery using a 5-6  French slender sheath. Left coronary angiography was done using a Rosana L3.5 diagnostic catheter. Right coronary angiography was done using a Rosana R4 guide catheter. CONTRAST:  Total of 60 cc. COMPLICATIONS:  None. At the end of the procedure a TR device was used for hemostasis. EBL: 5 cc    ANGIOGRAM/CORONARY ARTERIOGRAM:       The left main coronary artery is normal .    The left anterior descending artery has mild disease . D1 is normal     The left circumflex artery has moderate diffuse disease, mid 70% lesion   OM1 has a moderate disease     The right coronary artery is dominant vessel with widely patent proximal stent .    RPDA is normal       INTERVENTION  Guide catheter: 6F XB 3.5 guide   COMET II wire used to cross Lcx lesion   FFR 0.92 ( physiologically negative)     SUMMARY:   Nonobstructive CAD       RECOMMENDATION:      - aggressive medical therapy for CAD   - add ranexa 500mg po bid   - can be d/c home from cardiology   - outpatient follow up with private cardiologist Dr. Gordon Barboza MD [id number], Interventional Cardiology, and Peripheral Vascular Disease   [facility]   (C): [phone]  (O): [phone]

## 2022-12-21 NOTE — PROGRESS NOTES
Physician Progress Note      PATIENT:               Crystal Singleton  CSN #:                  [id number]  :                       1955  ADMIT DATE:       12/15/2022 2:49 AM  [address] DATE:        2022 2:04 PM  RESPONDING  PROVIDER #:        Duke Galicia MD          QUERY TEXT:    Pt with hx CAD and stents admitted with chest and epigastric discomfort. Developed diarrhea. LHC showed non-obstructive CAD consistent with previous   LHC. Per cardiology progress note on , pt thinks food poisoning may have   cause his symptoms. If possible, please document in progress notes and   discharge summary if you are evaluating and/or treating any of the following: The medical record reflects the following:  Risk Factors: hx CAD with stents  Clinical Indicators: chest and epigastric discomfort, developed diarrhea, LHC   showed non-obstructive CAD consistent with previous LHC  Treatment: cardiology consult, C    Thank you,  Rosanna Rao RN, BSN, CCD  [email or web address]  Options provided:  -- Chest and epigastric discomfort due to possible food poisoning  -- Chest pain due to CAD  -- Other - I will add my own diagnosis  -- Disagree - Not applicable / Not valid  -- Disagree - Clinically unable to determine / Unknown  -- Refer to Clinical Documentation Reviewer    PROVIDER RESPONSE TEXT:    This patient had chest and epigastric discomfort due to possible food   poisoning.     Query created by: Hannah Ward on 2022 6:06 AM      Electronically signed by:  Duke Galicia MD 2022 9:37 AM

## 2022-12-27 ENCOUNTER — CARE COORDINATION (OUTPATIENT)
Dept: CASE MANAGEMENT | Age: 67
End: 2022-12-27

## 2022-12-27 NOTE — CARE COORDINATION
[facility] Care Transitions Follow Up Call    Patient: Sarahi Gonzalez  Patient : 1955   MRN: [id number]  Reason for Admission: CP  Discharge Date: 22 RARS: Readmission Risk Score: 7.6    Attempted to reach pt for follow up call. Left message requesting call back.     Follow Up  Future Appointments   Date Time Provider Negin Dodge   2023  2:30 PM Justice Nevarez DO [facility] [place] - [place]   2023  1:20 PM GISSEL Hernández - PATI [facility]

## 2022-12-30 NOTE — PROGRESS NOTES
The [address] [place] [address], [address] [place] [address] [address] [address]  [phone]    PrimaryCare Doctor: Marielle Esparza DO  Primary Cardiologist: Dr. Jyoti Mejia    Chief Complaint   Patient presents with    Follow-Up from Hospital     No cardiac symptoms. History of Present Illness:  Mary Nassar is a 78 y.o. male with PMHsignificant for CAD/MI s/ p  ascending aortic aneurysm, SHF, crohn's dz, DM, small bowel rsection, pancreatitis, peritonitis     Adm [facility] w Chest pain 12/15-12/16/2022. Associated with SOB, abd pain, nausea and diarrhea. EKG and Trop neg. LHC with non obstructive CAD. L CX 70% lesion- started on ranexa. Patient presents to [facility] cardiology for follow up for CAD. Hind site, he now thinks he had food poisoning that caused his symtpoms. He has not had any further CP or SOB or GI symptoms. He is tolerating routine activities. Went to [facility] [employer] last night - no issues walking long distance to stadium or stairs to his seats. Review of Systems:   General: Denies fever, chills, fatigue, weakness  Skin: Denies skin changes, rash, itching, lesions.   HEENT: Denies headache, dizziness, vision changes, nosebleeds, sore throat, nasal drainage  RESP: Denies cough, sputum, dyspnea, wheeze, snoring  CARD: Denies palpitations,  murmur  GI:Denies nausea, vomiting, heartburn, loss of appetite, change in bowels  : Denies frequency, pain, incontinence, polyuria  VASC: Denies claudication, leg cramps, clots  MUSC/SKEL: Denies pain, stiffness, arthritis  PSYCH: Denies anxiety, depression, stress  NEURO: Denies numbness, tingling, weakness,change in mood or memory  HEME: Denies abn bruising, bleeding, anemia  ENDO: Denies intolerance to heat, cold, excessive thirst or hunger, hx thyroid disease    /66   Pulse 76   Ht 6' 2\" (1.88 m)   Wt 264 lb (119.7 kg)   SpO2 97%   BMI 33.90 kg/m²   Wt Readings from Last 3 Encounters:   01/16/23 264 lb (119.7 kg) 01/12/23 262 lb (118.8 kg)   12/16/22 258 lb 6.1 oz (117.2 kg)       Physical Exam:  GEN: Appears well, no acute distress  SKIN: Pink, warm, dry. Nails without clubbing. HEENT: PERRLA. Normocephalic, atraumatic. Neck supple. No adenopathy. LUNG: AP diameter normal. Clear bilateral. No wheeze, rales, or ronchi. Respiratory effort normal.  HEART: S1S2 A/R. No JVD. No carotid bruit. No murmur, rub or gallop. ABD: Soft, nontender. +BS X 4 quads. No hepatomegaly. EXT: Radial and pedal pulses 2+ and symmetric. Without varicosities. No edema. MUSCSKEL: Good ROM X4 extremities. No deformity. NEURO: A/O X3. Calm and cooperative. Past Medical History:   has a past medical history of Aneurysm of ascending aorta, CAD (coronary artery disease), Chronic diastolic CHF (congestive heart failure), NYHA class 2 ([address] Crohn's disease ([address] Diabetes mellitus ([facility] [address] Fistula of intestine, History of resection of small bowel, MRSA colonization, Pancreatitis, and Peritonitis ([facility] [address] Surgical History:   has a past surgical history that includes Cholecystectomy; Small intestine surgery; Abscess Drainage (2/11/15); Abscess Drainage (Right, 08/04/2017); Cardiac catheterization (08/2017); and Diagnostic Cardiac Cath Lab Procedure. Social History:   reports that he quit smoking about 5 years ago. His smoking use included cigarettes. He has a 15.00 pack-year smoking history. He quit smokeless tobacco use about 5 years ago. He reports that he does not currently use alcohol. He reports that he does not use drugs. Family History:   Family History   Problem Relation Age of Onset    Diabetes Mother     Cancer Mother        HomeMedications:  Prior to Admission medications    Medication Sig Start Date End Date Taking?  Authorizing Provider   insulin glargine (LANTUS SOLOSTAR) 100 UNIT/ML injection pen INJECT SUBCUTANEOUSLY 58  UNITS AT NIGHT 1/11/23  Yes Edwin Bond DO   ranolazine (RANEXA) 1000 MG extended release tablet Take 1 tablet by mouth 2 times daily 12/16/22  Yes Siri Kong APRN - CNP   ranolazine (RANEXA) 500 MG extended release tablet Take 1 tablet by mouth 2 times daily 12/16/22  Yes GISSEL Lennon CNP   BD PEN NEEDLE ALTHEA U/F 32G X 4 MM MISC USE 1 PEN NEEDLE IN THE  MORNING BEFORE BREAKFAST 11/22/22  Yes Ellie Fonseca DO   sildenafil (VIAGRA) 50 MG tablet Take 1 tablet by mouth as needed for Erectile Dysfunction 6/23/22  Yes Ellie Fonseca DO   metoprolol succinate (TOPROL XL) 25 MG extended release tablet Take 0.5 tablets by mouth nightly 6/21/22  Yes Edwin Bond DO   FARXIGA 10 MG tablet TAKE 1 TABLET BY MOUTH IN  THE MORNING 5/9/22  Yes Edwin Bond DO   glipiZIDE (GLUCOTROL XL) 5 MG extended release tablet TAKE 1 TABLET BY MOUTH  DAILY 4/25/22  Yes Edwin Bond DO   atorvastatin (LIPITOR) 40 MG tablet TAKE 1 TABLET BY MOUTH AT  NIGHT 4/25/22  Yes Edwin Bond DO   mesalamine (PENTASA) 500 MG extended release capsule TAKE 2 CAPSULES BY MOUTH  TWICE DAILY 4/4/22  Yes Edwin Bond DO   metFORMIN (GLUCOPHAGE) 500 MG tablet TAKE 1 TABLET BY MOUTH  TWICE DAILY WITH MEALS 4/4/22  Yes Edwni Bond DO   furosemide (LASIX) 20 MG tablet TAKE 1 TABLET BY MOUTH  DAILY 3/24/22  Yes Ellie Fonseca DO   HYDROcodone-acetaminophen (NORCO)  MG per tablet Take 1 tablet by mouth every 6 hours as needed.    Yes Historical Provider, MD   nitroGLYCERIN (NITROSTAT) 0.4 MG SL tablet Place 1 tablet under the tongue every 5 minutes as needed for Chest pain 7/7/20  Yes Edwin Bodn DO   glucose (GLUTOSE) 40 % GEL Take 37.5 mLs by mouth as needed (low blood sugar (less than 70)) 8/13/19  Yes Monty Gross MD   inFLIXimab (REMICADE) 100 MG injection Infuse 5 mg/kg intravenously See Admin Instructions q 6-8 wks   Yes Historical Provider, MD   aspirin 81 MG chewable tablet Take 1 tablet by mouth daily 8/15/17  Yes Nae Moran MD        Allergies:  Codeine, Oxycodone-acetaminophen, Percocet [oxycodone-acetaminophen], and Oxycodone       LABS: Results reviewed with patient today.     CBC:   Lab Results   Component Value Date/Time    WBC 5.0 12/16/2022 05:30 AM    WBC 6.2 12/15/2022 03:11 AM    WBC 6.1 08/03/2021 02:35 PM    RBC 4.87 12/16/2022 05:30 AM    RBC 5.56 12/15/2022 03:11 AM    RBC 5.50 08/03/2021 02:35 PM    HGB 13.6 12/16/2022 05:30 AM    HGB 15.3 12/15/2022 03:11 AM    HGB 15.7 08/03/2021 02:35 PM    HCT 41.7 12/16/2022 05:30 AM    HCT 47.1 12/15/2022 03:11 AM    HCT 46.8 08/03/2021 02:35 PM    MCV 85.8 12/16/2022 05:30 AM    MCV 84.8 12/15/2022 03:11 AM    MCV 85.1 08/03/2021 02:35 PM    RDW 15.0 12/16/2022 05:30 AM    RDW 14.6 12/15/2022 03:11 AM    RDW 14.4 08/03/2021 02:35 PM     12/16/2022 05:30 AM     12/15/2022 03:11 AM     08/03/2021 02:35 PM     BMP:  Lab Results   Component Value Date/Time     12/16/2022 05:30 AM     12/15/2022 03:11 AM     08/03/2021 02:35 PM    K 3.9 12/16/2022 05:30 AM    K 3.9 12/15/2022 03:11 AM    K 4.6 08/03/2021 02:35 PM    K 4.8 11/02/2020 11:21 AM    K 3.5 10/09/2020 05:22 AM    K 3.5 10/08/2020 05:18 AM     12/16/2022 05:30 AM     12/15/2022 03:11 AM     08/03/2021 02:35 PM    CO2 20 12/16/2022 05:30 AM    CO2 20 12/15/2022 03:11 AM    CO2 27 08/03/2021 02:35 PM    PHOS 3.7 03/11/2020 02:46 PM    PHOS 3.2 02/05/2020 02:16 PM    PHOS 3.5 12/14/2018 02:47 PM    BUN 14 12/16/2022 05:30 AM    BUN 17 12/15/2022 03:11 AM    BUN 18 08/03/2021 02:35 PM    CREATININE 0.7 12/16/2022 05:30 AM    CREATININE 0.9 12/15/2022 03:11 AM    CREATININE 1.0 08/03/2021 02:35 PM     BNP:   Lab Results   Component Value Date/Time    PROBNP 31 12/15/2022 03:11 AM    PROBNP 59 01/18/2018 02:54 PM    PROBNP 19 11/30/2017 04:03 PM       Parameters:   > 450 pg/mL under age 48  > 900 pg/mL ages 54-65  > 1800 pg/mL over age 76    Iron Studies:  No results found for: TIBC, FERRITIN  GLUCOSE:   No results for input(s): GLUCOSE in the last 72 hours. LIVER PROFILE:   Lab Results   Component Value Date/Time    AST 14 12/15/2022 03:11 AM    ALT 20 12/15/2022 03:11 AM    LIPASE 40.0 12/15/2022 03:11 AM    AMYLASE 59 09/24/2010 05:10 AM    LABALBU 3.7 12/15/2022 03:11 AM    BILIDIR <0.2 12/14/2018 02:47 PM    BILITOT 0.6 12/15/2022 03:11 AM    ALKPHOS 102 12/15/2022 03:11 AM     PT/INR:   Lab Results   Component Value Date/Time    PROTIME 10.8 08/12/2017 08:59 AM    INR 0.96 08/12/2017 08:59 AM     Cardiac Enzymes:  Lab Results   Component Value Date/Time    TROPONINI <0.01 12/15/2022 11:37 AM     FASTING LIPID PANEL:  Lab Results   Component Value Date/Time    CHOL 112 07/14/2022 12:40 PM    HDL 46 07/14/2022 12:40 PM    LDLCALC 47 07/14/2022 12:40 PM    TRIG 97 07/14/2022 12:40 PM     TSH: No results found for: TSH    Cardiac Imaging: Reports interpreted by me and reviewed with patient today. LHC/PCI 8/14/2017 (Dr. Jovany Bernard)  -100% proximal-mid RCA stenosis stented with a 3.5 x 38 and 4.0 x 16 mm overlapping Synergy stents  -70% stenosis in a large OM and 95% stenosis in a narrow-caliber diagonal branch  -LVEF 35% with global dysfunction but more severe inferior wall dysfunction  -LVEDP of 20 post procedure        Imaging:      Echo 8/14/2017  Normal LV size and systolic function. Estimated ejection fraction is 60%. No valvular abnormalities. Normal study. CTA pulmonary 8/12/2017  No acute pulmonary embolus detected. Mild aneurysmal dilation of the ascending aorta, 4.1 cm. Coronary artery   calcifications are noted. 2 patchy areas of ground-glass airspace disease right lower lobe. Pneumonia   is suspected. 8.1 mm size nodule left lower lobe along the diaphragmatic pleura. This   could be within the lung parenchyma or involve the pleura. Follow-up is   recommended, see below.       Chest CT 4/3/18  Mild aneurysmal dilatation of the ascending aorta measuring up   to 4 cm, stable      Echo 5/13/19  Overall left ventricular systolic function is mildly depressed . Ejection fraction is visually estimated to be 45 %. There is moderate hypokinesis of the basal to apical septal walls. The mitral valve normal in structure and function. Mild mitral regurgitation is present. The aortic root is mildly dilated. 3.5 cm  The ascending aorta is mildly dilated. Chest CT 11/10/21  There is mild dilatation of the ascending aorta measuring up to 3.8 cm,   unchanged dating back to 2017. NM Stress 11/29/2021  Summary  Normal stress myocardial perfusion. Overall findings represent a low risk scan. Poor exercise tolerance  Calculated EF is mildly reduced at 50%     CXR 12/15/2022  Impression   Clear chest without acute cardiopulmonary process. ABD CT: 12/15/2022  Impression   1. No acute abnormality. [address] 12/16/2022:  ANGIOGRAM/CORONARY ARTERIOGRAM:        The left main coronary artery is normal .     The left anterior descending artery has mild disease . D1 is normal      The left circumflex artery has moderate diffuse disease, mid 70% lesion              OM1 has a moderate disease      The right coronary artery is dominant vessel with widely patent proximal stent . RPDA is normal         INTERVENTION  Guide catheter: 6F XB 3.5 guide   COMET II wire used to cross Lcx lesion   FFR 0.92 ( physiologically negative)      SUMMARY:   Nonobstructive CAD         RECOMMENDATION:       - aggressive medical therapy for CAD   - add ranexa 500mg po bid   - can be d/c home from cardiology   - outpatient follow up with private cardiologist Dr. Ivana Hoffman:    1.) CAD s/p SHAN to RCA. [facility] 1/16 with non obstructive CAD. L CX with 70% lesion. Started on ranexa. No further chest pain or SOB. Suspect CP was GI related. Cont current GDMT.   DAPT: asa  Beta Blocker: toprol XL  ACEi/ARB:none  Anti anginal: ranexa  Lipid management/high intensity statin: lipitor  Risk factor management: high blood pressure, high cholesterol, Diabetes, smoking, obesity, family hx  Lifestyle modification: Heart healthy diet, regular exercise, weight loss, smoking cessation, stress reduction     2.) Chronic systolic heart failure, ICM, basal/apical hypokinesis, LVEF 45%: Appears euvolemic, CXR without pulm edema, BNP WNL. No edema, JVD, wt gain. NYHA Class II-III           Stage C  Diuretic: none (resume lasix after [facility] tomorrow- hold or now)  Cont toprol XL  Cont farxiga as OP (non formulary)     Cardiac Rehab for EF <35%: NA  2gm Na diet, daily weight, 64 oz fluid restriction  Avoid NSAIDS and other nephrotoxic meds  [facility] Referral: OP     3.) [facility]     Follow UP 6 months    Instructions:     Heart Healthy Diet  Blood pressure control if  you have high blood pressure  Diabetic control if you have diabetes  Smoking cessation if you smoke  Weight loss if BMI >30  Regular exercise when OK per cardiac rehab  Stress Reduction    Call your Doctor if you have:   Increased shortness of breath, weakness, or increased fatigue  A fast or a slow heartbeat  Problems or questions with your medications  Feeling lightheaded or dizzy  Unusual swelling of lower legs/feet, chest discomfort, unusual weakness or fatigue    I appreciate the opportunity of cooperating in the care of this individual.    JOHNNY Lorenzo, GISSEL - CNP, CNP, 1/16/2023,1:48 PM

## 2023-01-04 ENCOUNTER — CARE COORDINATION (OUTPATIENT)
Dept: CASE MANAGEMENT | Age: 68
End: 2023-01-04

## 2023-01-04 NOTE — CARE COORDINATION
[facility] Transitions Follow Up Call    2023    Patient: Anoop Ding  Patient : 1955   MRN: [id number]  Reason for Admission: Chest pain  Discharge Date: 22 RARS: Readmission Risk Score: 7.6         Attempted to contact patient for follow up transition call. Unable to leave a voicemail message to return call. Will continue to follow. Care Transitions Subsequent and Final Call    Subsequent and Final Calls  Care Transitions Interventions  Other Interventions:              Follow Up  Future Appointments   Date Time Provider Negin Dodge   2023  2:30 PM DO Lesley Hernandez [facility] [place] - [place]   2023  1:20 PM Siri Brennan, APRN - CNP [facility]       Vickie Rushing LPN

## 2023-01-09 ENCOUNTER — CARE COORDINATION (OUTPATIENT)
Dept: CASE MANAGEMENT | Age: 68
End: 2023-01-09

## 2023-01-09 NOTE — CARE COORDINATION
[facility] Transitions Follow Up Call    2023    Patient: Cornelia Morel  Patient : 1955   MRN: [id number]  Reason for Admission: Chest pain  Discharge Date: 22 RARS: Readmission Risk Score: 7.6         Spoke with: mame    [facility] 3 rd  attempted outreach. Left message and contact information for call back. No further outreach will be attempted. Quintin Pedroza LPN  Care Coordinator     Care Transitions Subsequent and Final Call    Subsequent and Final Calls  Care Transitions Interventions  Other Interventions:              Follow Up  Future Appointments   Date Time Provider Negin Dodge   2023  2:30 PM Leoncio Sethi DO [facility] [place] [facility] [place]   2023  1:20 PM GISSEL Ramirez - CNP [facility]       Quintin Pedroza LPN

## 2023-01-11 DIAGNOSIS — I25.119 CORONARY ARTERY DISEASE INVOLVING NATIVE CORONARY ARTERY OF NATIVE HEART WITH ANGINA PECTORIS (HCC): ICD-10-CM

## 2023-01-11 DIAGNOSIS — K50.90 CROHN'S DISEASE WITHOUT COMPLICATION, UNSPECIFIED GASTROINTESTINAL TRACT LOCATION (HCC): ICD-10-CM

## 2023-01-11 DIAGNOSIS — I21.4 NSTEMI (NON-ST ELEVATED MYOCARDIAL INFARCTION) (HCC): ICD-10-CM

## 2023-01-11 DIAGNOSIS — I71.21 ASCENDING AORTIC ANEURYSM: ICD-10-CM

## 2023-01-11 DIAGNOSIS — E11.65 UNCONTROLLED TYPE 2 DIABETES MELLITUS WITH HYPERGLYCEMIA (HCC): ICD-10-CM

## 2023-01-11 RX ORDER — INSULIN GLARGINE 100 [IU]/ML
INJECTION, SOLUTION SUBCUTANEOUS
Qty: 60 ML | Refills: 3 | Status: SHIPPED | OUTPATIENT
Start: 2023-01-11

## 2023-01-11 NOTE — TELEPHONE ENCOUNTER
Last office visit 9/23/2022     Last written      Next office visit scheduled 1/12/2023    Requested Prescriptions     Pending Prescriptions Disp Refills    insulin glargine (LANTUS SOLOSTAR) 100 UNIT/ML injection pen [Pharmacy Med Name: Lantus SoloStar 100 UNIT/ML Subcutaneous Solution Pen-injector] 60 mL 3     Sig: INJECT SUBCUTANEOUSLY 58  UNITS AT NIGHT

## 2023-01-12 ENCOUNTER — OFFICE VISIT (OUTPATIENT)
Dept: FAMILY MEDICINE CLINIC | Age: 68
End: 2023-01-12
Payer: MEDICARE

## 2023-01-12 VITALS
DIASTOLIC BLOOD PRESSURE: 66 MMHG | BODY MASS INDEX: 33.62 KG/M2 | SYSTOLIC BLOOD PRESSURE: 112 MMHG | HEIGHT: 74 IN | WEIGHT: 262 LBS

## 2023-01-12 DIAGNOSIS — N52.9 ERECTILE DYSFUNCTION, UNSPECIFIED ERECTILE DYSFUNCTION TYPE: ICD-10-CM

## 2023-01-12 DIAGNOSIS — I21.4 NSTEMI (NON-ST ELEVATED MYOCARDIAL INFARCTION) (HCC): ICD-10-CM

## 2023-01-12 DIAGNOSIS — E11.65 UNCONTROLLED TYPE 2 DIABETES MELLITUS WITH HYPERGLYCEMIA (HCC): Primary | ICD-10-CM

## 2023-01-12 LAB — HBA1C MFR BLD: 9.2 %

## 2023-01-12 PROCEDURE — 3046F HEMOGLOBIN A1C LEVEL >9.0%: CPT | Performed by: FAMILY MEDICINE

## 2023-01-12 PROCEDURE — G8427 DOCREV CUR MEDS BY ELIG CLIN: HCPCS | Performed by: FAMILY MEDICINE

## 2023-01-12 PROCEDURE — 99214 OFFICE O/P EST MOD 30 MIN: CPT | Performed by: FAMILY MEDICINE

## 2023-01-12 PROCEDURE — G8417 CALC BMI ABV UP PARAM F/U: HCPCS | Performed by: FAMILY MEDICINE

## 2023-01-12 PROCEDURE — 1123F ACP DISCUSS/DSCN MKR DOCD: CPT | Performed by: FAMILY MEDICINE

## 2023-01-12 PROCEDURE — 3017F COLORECTAL CA SCREEN DOC REV: CPT | Performed by: FAMILY MEDICINE

## 2023-01-12 PROCEDURE — 1036F TOBACCO NON-USER: CPT | Performed by: FAMILY MEDICINE

## 2023-01-12 PROCEDURE — 2022F DILAT RTA XM EVC RTNOPTHY: CPT | Performed by: FAMILY MEDICINE

## 2023-01-12 PROCEDURE — G8484 FLU IMMUNIZE NO ADMIN: HCPCS | Performed by: FAMILY MEDICINE

## 2023-01-12 PROCEDURE — 83036 HEMOGLOBIN GLYCOSYLATED A1C: CPT | Performed by: FAMILY MEDICINE

## 2023-01-12 PROCEDURE — 1111F DSCHRG MED/CURRENT MED MERGE: CPT | Performed by: FAMILY MEDICINE

## 2023-01-12 SDOH — ECONOMIC STABILITY: FOOD INSECURITY: WITHIN THE PAST 12 MONTHS, YOU WORRIED THAT YOUR FOOD WOULD RUN OUT BEFORE YOU GOT MONEY TO BUY MORE.: NEVER TRUE

## 2023-01-12 SDOH — ECONOMIC STABILITY: FOOD INSECURITY: WITHIN THE PAST 12 MONTHS, THE FOOD YOU BOUGHT JUST DIDN'T LAST AND YOU DIDN'T HAVE MONEY TO GET MORE.: NEVER TRUE

## 2023-01-12 ASSESSMENT — SOCIAL DETERMINANTS OF HEALTH (SDOH): HOW HARD IS IT FOR YOU TO PAY FOR THE VERY BASICS LIKE FOOD, HOUSING, MEDICAL CARE, AND HEATING?: NOT HARD AT ALL

## 2023-01-12 ASSESSMENT — ENCOUNTER SYMPTOMS
VOMITING: 0
ABDOMINAL PAIN: 0
BLOOD IN STOOL: 0
SHORTNESS OF BREATH: 0
COUGH: 0
SINUS PRESSURE: 0
TROUBLE SWALLOWING: 0
RHINORRHEA: 0
ANAL BLEEDING: 0
DIARRHEA: 0
NAUSEA: 0
SORE THROAT: 0

## 2023-01-12 ASSESSMENT — PATIENT HEALTH QUESTIONNAIRE - PHQ9
SUM OF ALL RESPONSES TO PHQ QUESTIONS 1-9: 0
SUM OF ALL RESPONSES TO PHQ QUESTIONS 1-9: 0
1. LITTLE INTEREST OR PLEASURE IN DOING THINGS: 0
SUM OF ALL RESPONSES TO PHQ QUESTIONS 1-9: 0
2. FEELING DOWN, DEPRESSED OR HOPELESS: 0
SUM OF ALL RESPONSES TO PHQ QUESTIONS 1-9: 0
SUM OF ALL RESPONSES TO PHQ9 QUESTIONS 1 & 2: 0

## 2023-01-12 NOTE — PROGRESS NOTES
2023     Chiara Pineda (:  1955) is a 78 y.o. male, here for evaluation of the following medical concerns:    HPI  Today, patient followed up on his chronic medical conditions. Overall he feels well. 1.  DM II-  reason for visit today, Takes lantus 62  at night will increase this to 58, patient has A1C that was 9.2  down to 10.5, He hasn't been monitoring his glucose at home, not sure what else he can do? Seems frustrated, also taking glipizide 5 mg daily,  Metformin 500 BID, unable to tolerate higher dose due to Crohns,  needs eye exam and foot exam today. 2.   CAD-  NSTEMI- 2017/Ascending aortic aneurysm, follows with Dr. Juan Ruano, last visit was in , Hospitalized from 2017-8/15/2017 with NSTEMI. He underwent cardiac cath which resulted in SHAN x2 to RCA. LVEF 35% initially and follow up echo showing LVEF 55-60% (done after PCI). aneurysm- 4 cm according to note from cardiology has been stable since 2017. 3.  Crohn's- still following with GI, needs colonoscopy and will defer to his GI doctor. Stated they have spoken concerning this and have a plan to revisit the test this year. Will follow up with GI for colonoscopy. 4. Hyperlipidemia- taking medication as prescribed, feels this is well controlled, last LDL is 27, HDL is 48, no side effects from the medication. Today, denied chest pain, sob, n, v, or diarrhea. Review of Systems   Constitutional:  Positive for fatigue. Negative for activity change, fever and unexpected weight change. HENT:  Negative for congestion, ear pain, rhinorrhea, sinus pressure, sore throat and trouble swallowing. Eyes:  Negative for visual disturbance. Respiratory:  Negative for cough and shortness of breath. Cardiovascular:  Negative for chest pain, palpitations and leg swelling. Gastrointestinal:  Negative for abdominal pain, anal bleeding, blood in stool, diarrhea, nausea and vomiting.    Endocrine: Negative for cold intolerance, heat intolerance, polydipsia and polyphagia. Musculoskeletal:  Positive for arthralgias. Skin:  Negative for rash. Neurological:  Negative for dizziness, light-headedness and headaches. Hematological:  Does not bruise/bleed easily. Psychiatric/Behavioral:  Negative for dysphoric mood. The patient is not nervous/anxious. Prior to Visit Medications    Medication Sig Taking? Authorizing Provider   ranolazine (RANEXA) 500 MG extended release tablet Take 1 tablet by mouth 2 times daily Yes Siri Jimenez, APRN - CNP   BD PEN NEEDLE ALTHEA U/F 32G X 4 MM MISC USE 1 PEN NEEDLE IN THE  MORNING BEFORE BREAKFAST Yes Edwin Bond DO   sildenafil (VIAGRA) 50 MG tablet Take 1 tablet by mouth as needed for Erectile Dysfunction Yes Edwin Bond DO   metoprolol succinate (TOPROL XL) 25 MG extended release tablet Take 0.5 tablets by mouth nightly Yes Edwin Bond DO   FARXIGA 10 MG tablet TAKE 1 TABLET BY MOUTH IN  THE MORNING Yes Edwin Bond DO   glipiZIDE (GLUCOTROL XL) 5 MG extended release tablet TAKE 1 TABLET BY MOUTH  DAILY Yes Edwin Bond DO   atorvastatin (LIPITOR) 40 MG tablet TAKE 1 TABLET BY MOUTH AT  NIGHT Yes Edwin Bond DO   mesalamine (PENTASA) 500 MG extended release capsule TAKE 2 CAPSULES BY MOUTH  TWICE DAILY Yes Edwin Bond DO   metFORMIN (GLUCOPHAGE) 500 MG tablet TAKE 1 TABLET BY MOUTH  TWICE DAILY WITH MEALS Yes Edwin Bond DO   furosemide (LASIX) 20 MG tablet TAKE 1 TABLET BY MOUTH  DAILY Yes Edwin Bond DO   HYDROcodone-acetaminophen (NORCO)  MG per tablet Take 1 tablet by mouth every 6 hours as needed.  Yes Historical Provider, MD   nitroGLYCERIN (NITROSTAT) 0.4 MG SL tablet Place 1 tablet under the tongue every 5 minutes as needed for Chest pain Yes Edwin Bond DO   inFLIXimab (REMICADE) 100 MG injection Infuse 5 mg/kg intravenously See Admin Instructions q 6-8 wks Yes Historical Provider, MD   aspirin 81 MG chewable tablet Take 1 tablet by mouth daily Yes Xander Briscoe MD   insulin glargine (LANTUS SOLOSTAR) 100 UNIT/ML injection pen INJECT SUBCUTANEOUSLY 58  UNITS AT NIGHT  Edwin Bond DO   ranolazine (RANEXA) 1000 MG extended release tablet Take 1 tablet by mouth 2 times daily  GISSEL Fuentes - CNP   glucose (GLUTOSE) 40 % GEL Take 37.5 mLs by mouth as needed (low blood sugar (less than 70))  Jaelyn Adams MD        Social History     Tobacco Use    Smoking status: Former     Packs/day: 1.50     Years: 10.00     Pack years: 15.00     Types: Cigarettes     Quit date: 2017     Years since quittin.4    Smokeless tobacco: Former     Quit date: 2017   Substance Use Topics    Alcohol use: Not Currently     Comment: rarely        Vitals:    23 1427   BP: 112/66   Weight: 262 lb (118.8 kg)   Height: 6' 2\" (1.88 m)     Estimated body mass index is 33.64 kg/m² as calculated from the following:    Height as of this encounter: 6' 2\" (1.88 m). Weight as of this encounter: 262 lb (118.8 kg). Physical Exam  Constitutional:       Appearance: He is well-developed. HENT:      Head: Normocephalic and atraumatic. Right Ear: External ear normal.      Left Ear: External ear normal.   Eyes:      Pupils: Pupils are equal, round, and reactive to light. Neck:      Thyroid: No thyromegaly. Cardiovascular:      Rate and Rhythm: Normal rate and regular rhythm. Heart sounds: No murmur heard. Pulmonary:      Effort: Pulmonary effort is normal.      Breath sounds: Normal breath sounds. No wheezing or rales. Abdominal:      General: Bowel sounds are normal.      Palpations: Abdomen is soft. Tenderness: There is no abdominal tenderness. Musculoskeletal:      Cervical back: Neck supple. Lymphadenopathy:      Cervical: No cervical adenopathy. Skin:     Findings: No rash. Neurological:      Mental Status: He is alert and oriented to person, place, and time.    Psychiatric:         Behavior: Behavior normal. Judgment: Judgment normal.       ASSESSMENT/PLAN:  1. Uncontrolled type 2 diabetes mellitus with hyperglycemia ([facility] [address]  Patient will need to keep a log of his glucose readings and bring them to the office. He needs to monitor his diet and exercise. Attempt to lose weight. Discussed proper eating habits. Continue to take medication as prescribed. Will obtain A1C at this visit. Educated on signs and symptoms for immediate evaluation in the ER.    - POCT glycosylated hemoglobin (Hb A1C)    2. NSTEMI (non-ST elevated myocardial infarction) ([facility]  Stable  Continue with medication  Keep appointments with specialist.   Fuentes Reese questions     3. Erectile dysfunction, unspecified erectile dysfunction type  Stable  Continue with medication  Answered questions     Return in about 3 months (around 4/12/2023).

## 2023-01-16 ENCOUNTER — OFFICE VISIT (OUTPATIENT)
Dept: CARDIOLOGY CLINIC | Age: 68
End: 2023-01-16
Payer: MEDICARE

## 2023-01-16 VITALS
DIASTOLIC BLOOD PRESSURE: 66 MMHG | SYSTOLIC BLOOD PRESSURE: 134 MMHG | HEART RATE: 76 BPM | WEIGHT: 264 LBS | BODY MASS INDEX: 33.88 KG/M2 | OXYGEN SATURATION: 97 % | HEIGHT: 74 IN

## 2023-01-16 DIAGNOSIS — I25.119 CORONARY ARTERY DISEASE INVOLVING NATIVE CORONARY ARTERY OF NATIVE HEART WITH ANGINA PECTORIS (HCC): Primary | ICD-10-CM

## 2023-01-16 PROCEDURE — 1036F TOBACCO NON-USER: CPT | Performed by: NURSE PRACTITIONER

## 2023-01-16 PROCEDURE — G8484 FLU IMMUNIZE NO ADMIN: HCPCS | Performed by: NURSE PRACTITIONER

## 2023-01-16 PROCEDURE — 3017F COLORECTAL CA SCREEN DOC REV: CPT | Performed by: NURSE PRACTITIONER

## 2023-01-16 PROCEDURE — G8427 DOCREV CUR MEDS BY ELIG CLIN: HCPCS | Performed by: NURSE PRACTITIONER

## 2023-01-16 PROCEDURE — G8417 CALC BMI ABV UP PARAM F/U: HCPCS | Performed by: NURSE PRACTITIONER

## 2023-01-16 PROCEDURE — 1123F ACP DISCUSS/DSCN MKR DOCD: CPT | Performed by: NURSE PRACTITIONER

## 2023-01-16 PROCEDURE — 99214 OFFICE O/P EST MOD 30 MIN: CPT | Performed by: NURSE PRACTITIONER

## 2023-01-16 NOTE — PATIENT INSTRUCTIONS
Heart Healthy Diet  Blood pressure control if  you have high blood pressure  Diabetic control if you have diabetes  Smoking cessation if you smoke  Weight loss if BMI >30  Regular exercise when OK per cardiac rehab  Stress Reduction    Call your Doctor if you have:   Increased shortness of breath, weakness, or increased fatigue  A fast or a slow heartbeat  Problems or questions with your medications  Feeling lightheaded or dizzy  Unusual swelling of lower legs/feet, chest discomfort, unusual weakness or fatigue

## 2023-01-17 NOTE — TELEPHONE ENCOUNTER
Medication Refill    Medication needing refilled:  ranolazine (RANEXA)    Dosage of the medication:  500 MG extended release tablet     How are you taking this medication (QD, BID, TID, QID, PRN):  Take 1 tablet by mouth 2 times daily    30 or 90 day supply called in: 90 day supply     When will you run out of your medication:    Which Pharmacy are we sending the medication to?:    [facility] ([facility] ) - [place], [place] - [address] [place] [address] [phone] - F [phone]   [address] [place] [id number]   Phone:  [phone]  Fax:  [phone]

## 2023-01-19 RX ORDER — RANOLAZINE 500 MG/1
500 TABLET, EXTENDED RELEASE ORAL 2 TIMES DAILY
Qty: 90 TABLET | Refills: 7 | Status: SHIPPED | OUTPATIENT
Start: 2023-01-19

## 2023-03-20 DIAGNOSIS — I25.5 ISCHEMIC CARDIOMYOPATHY: ICD-10-CM

## 2023-03-20 RX ORDER — FUROSEMIDE 20 MG/1
TABLET ORAL
Qty: 90 TABLET | Refills: 3 | Status: SHIPPED | OUTPATIENT
Start: 2023-03-20

## 2023-03-20 RX ORDER — MESALAMINE 500 MG/1
CAPSULE, EXTENDED RELEASE ORAL
Qty: 360 CAPSULE | Refills: 3 | Status: SHIPPED | OUTPATIENT
Start: 2023-03-20

## 2023-03-30 ENCOUNTER — TELEPHONE (OUTPATIENT)
Dept: CARDIOLOGY CLINIC | Age: 68
End: 2023-03-30

## 2023-03-30 DIAGNOSIS — I71.21 ANEURYSM OF ASCENDING AORTA WITHOUT RUPTURE (HCC): Primary | ICD-10-CM

## 2023-03-30 NOTE — TELEPHONE ENCOUNTER
Siri, please advise. Thanks. You saw patient on 1/16/23 - no documentation on when patient needs repeat chest CT. Last one 11/2021. Please advise when he will need a repeat CT and I will call patient, thank you.

## 2023-03-30 NOTE — TELEPHONE ENCOUNTER
Adán's wife called in this afternoon, she would like to know the last time his aortic ascending aneurysm was checked. She can be reached at [phone].

## 2023-04-17 NOTE — TELEPHONE ENCOUNTER
Last office visit 1/12/2023     Last written      Next office visit scheduled 4/19/2023    Requested Prescriptions     Pending Prescriptions Disp Refills    metFORMIN (GLUCOPHAGE) 500 MG tablet [Pharmacy Med Name: metFORMIN HCl 500 MG Oral Tablet] 180 tablet 3     Sig: TAKE 1 TABLET BY MOUTH  TWICE DAILY WITH MEALS

## 2023-04-19 ENCOUNTER — OFFICE VISIT (OUTPATIENT)
Dept: FAMILY MEDICINE CLINIC | Age: 68
End: 2023-04-19
Payer: MEDICARE

## 2023-04-19 ENCOUNTER — HOSPITAL ENCOUNTER (OUTPATIENT)
Dept: CT IMAGING | Age: 68
Discharge: HOME | End: 2023-04-19
Payer: MEDICARE

## 2023-04-19 VITALS
WEIGHT: 262 LBS | BODY MASS INDEX: 33.62 KG/M2 | DIASTOLIC BLOOD PRESSURE: 72 MMHG | HEIGHT: 74 IN | SYSTOLIC BLOOD PRESSURE: 112 MMHG

## 2023-04-19 DIAGNOSIS — E11.65 UNCONTROLLED TYPE 2 DIABETES MELLITUS WITH HYPERGLYCEMIA (HCC): Primary | ICD-10-CM

## 2023-04-19 DIAGNOSIS — I71.21 ANEURYSM OF ASCENDING AORTA WITHOUT RUPTURE (HCC): ICD-10-CM

## 2023-04-19 DIAGNOSIS — K50.90 CROHN'S DISEASE WITHOUT COMPLICATION, UNSPECIFIED GASTROINTESTINAL TRACT LOCATION (HCC): ICD-10-CM

## 2023-04-19 LAB
CREATININE URINE POCT: 50
HBA1C MFR BLD: 10 %
MICROALBUMIN/CREAT 24H UR: 80 MG/G{CREAT}
MICROALBUMIN/CREAT UR-RTO: >300
PERFORMED ON: NORMAL
POC CREATININE: 1.3 MG/DL (ref 0.8–1.3)
POC SAMPLE TYPE: NORMAL

## 2023-04-19 PROCEDURE — 99214 OFFICE O/P EST MOD 30 MIN: CPT | Performed by: FAMILY MEDICINE

## 2023-04-19 PROCEDURE — 83036 HEMOGLOBIN GLYCOSYLATED A1C: CPT | Performed by: FAMILY MEDICINE

## 2023-04-19 PROCEDURE — 82044 UR ALBUMIN SEMIQUANTITATIVE: CPT | Performed by: FAMILY MEDICINE

## 2023-04-19 PROCEDURE — 71260 CT THORAX DX C+: CPT

## 2023-04-19 PROCEDURE — 3017F COLORECTAL CA SCREEN DOC REV: CPT | Performed by: FAMILY MEDICINE

## 2023-04-19 PROCEDURE — 1036F TOBACCO NON-USER: CPT | Performed by: FAMILY MEDICINE

## 2023-04-19 PROCEDURE — 6360000004 HC RX CONTRAST MEDICATION: Performed by: NURSE PRACTITIONER

## 2023-04-19 PROCEDURE — 2022F DILAT RTA XM EVC RTNOPTHY: CPT | Performed by: FAMILY MEDICINE

## 2023-04-19 PROCEDURE — 82565 ASSAY OF CREATININE: CPT

## 2023-04-19 PROCEDURE — G8427 DOCREV CUR MEDS BY ELIG CLIN: HCPCS | Performed by: FAMILY MEDICINE

## 2023-04-19 PROCEDURE — 3046F HEMOGLOBIN A1C LEVEL >9.0%: CPT | Performed by: FAMILY MEDICINE

## 2023-04-19 PROCEDURE — 1123F ACP DISCUSS/DSCN MKR DOCD: CPT | Performed by: FAMILY MEDICINE

## 2023-04-19 PROCEDURE — G8417 CALC BMI ABV UP PARAM F/U: HCPCS | Performed by: FAMILY MEDICINE

## 2023-04-19 RX ADMIN — IOPAMIDOL 75 ML: 755 INJECTION, SOLUTION INTRAVENOUS at 11:58

## 2023-04-19 NOTE — PROGRESS NOTES
15.00     Types: Cigarettes     Quit date: 2017     Years since quittin.6    Smokeless tobacco: Former     Quit date: 2017   Substance Use Topics    Alcohol use: Not Currently     Comment: rarely        Vitals:    23 1316   BP: 112/72   Weight: 262 lb (118.8 kg)   Height: 6' 2\" (1.88 m)     Estimated body mass index is 33.64 kg/m² as calculated from the following:    Height as of this encounter: 6' 2\" (1.88 m). Weight as of this encounter: 262 lb (118.8 kg). Physical Exam  Nursing note reviewed. Constitutional:       Appearance: He is well-developed. HENT:      Head: Normocephalic and atraumatic. Right Ear: External ear normal.      Left Ear: External ear normal.   Eyes:      Pupils: Pupils are equal, round, and reactive to light. Neck:      Thyroid: No thyromegaly. Cardiovascular:      Rate and Rhythm: Normal rate and regular rhythm. Heart sounds: No murmur heard. Pulmonary:      Effort: Pulmonary effort is normal.      Breath sounds: Normal breath sounds. No wheezing or rales. Abdominal:      General: Bowel sounds are normal.      Palpations: Abdomen is soft. Tenderness: There is no abdominal tenderness. Musculoskeletal:      Cervical back: Neck supple. Lymphadenopathy:      Cervical: No cervical adenopathy. Skin:     Findings: No rash. Neurological:      Mental Status: He is alert and oriented to person, place, and time. Psychiatric:         Behavior: Behavior normal.         Judgment: Judgment normal.       ASSESSMENT/PLAN:  1. Uncontrolled type 2 diabetes mellitus with hyperglycemia ([address]  Patient will need to keep a log of his glucose readings and bring them to the office. He needs to monitor his diet and exercise. Attempt to lose weight. Discussed proper eating habits. Continue to take medication as prescribed. Will obtain A1C at this visit.    Educated on signs and symptoms for immediate evaluation in the ER.    - POCT microalbumin  -

## 2023-04-23 ASSESSMENT — ENCOUNTER SYMPTOMS
COLOR CHANGE: 0
ABDOMINAL PAIN: 0
VOMITING: 0
NAUSEA: 0
DIARRHEA: 0
BACK PAIN: 0
SHORTNESS OF BREATH: 0

## 2023-04-24 DIAGNOSIS — I71.21 ANEURYSM OF ASCENDING AORTA WITHOUT RUPTURE (HCC): Primary | ICD-10-CM

## 2023-04-25 ENCOUNTER — TELEPHONE (OUTPATIENT)
Dept: FAMILY MEDICINE CLINIC | Age: 68
End: 2023-04-25

## 2023-04-25 NOTE — TELEPHONE ENCOUNTER
Patient's wife called with concerns regarding the patient's CT scan from 4/25 ordered by the cardiologist. Results received via [address]  Advised patient's wife I would send a message but that they should also consult with the ordering physician

## 2023-06-18 DIAGNOSIS — I25.10 CORONARY ARTERY DISEASE INVOLVING NATIVE HEART WITHOUT ANGINA PECTORIS, UNSPECIFIED VESSEL OR LESION TYPE: ICD-10-CM

## 2023-06-18 DIAGNOSIS — E78.5 HYPERLIPIDEMIA, UNSPECIFIED HYPERLIPIDEMIA TYPE: ICD-10-CM

## 2023-06-19 RX ORDER — ATORVASTATIN CALCIUM 40 MG/1
TABLET, FILM COATED ORAL
Qty: 90 TABLET | Refills: 3 | Status: SHIPPED | OUTPATIENT
Start: 2023-06-19

## 2023-06-19 NOTE — TELEPHONE ENCOUNTER
Last office visit 4/19/2023       Next office visit scheduled 7/26/2023    Requested Prescriptions     Pending Prescriptions Disp Refills    atorvastatin (LIPITOR) 40 MG tablet [Pharmacy Med Name: Atorvastatin Calcium 40 MG Oral Tablet] 90 tablet 3     Sig: TAKE 1 TABLET BY MOUTH AT  NIGHT

## 2023-06-26 RX ORDER — MESALAMINE 500 MG/1
1000 CAPSULE, EXTENDED RELEASE ORAL 2 TIMES DAILY
Qty: 360 CAPSULE | Refills: 0 | Status: SHIPPED | OUTPATIENT
Start: 2023-06-26

## 2023-06-28 DIAGNOSIS — I21.4 NSTEMI (NON-ST ELEVATED MYOCARDIAL INFARCTION) (HCC): ICD-10-CM

## 2023-06-28 DIAGNOSIS — K50.90 CROHN'S DISEASE WITHOUT COMPLICATION, UNSPECIFIED GASTROINTESTINAL TRACT LOCATION (HCC): ICD-10-CM

## 2023-06-28 DIAGNOSIS — I25.119 CORONARY ARTERY DISEASE INVOLVING NATIVE CORONARY ARTERY OF NATIVE HEART WITH ANGINA PECTORIS (HCC): ICD-10-CM

## 2023-06-28 DIAGNOSIS — I71.21 ASCENDING AORTIC ANEURYSM (HCC): ICD-10-CM

## 2023-06-28 DIAGNOSIS — E11.65 UNCONTROLLED TYPE 2 DIABETES MELLITUS WITH HYPERGLYCEMIA (HCC): ICD-10-CM

## 2023-06-28 RX ORDER — DAPAGLIFLOZIN 10 MG/1
TABLET, FILM COATED ORAL
Qty: 90 TABLET | Refills: 3 | Status: SHIPPED | OUTPATIENT
Start: 2023-06-28

## 2023-07-26 DIAGNOSIS — I25.10 CORONARY ARTERY DISEASE INVOLVING NATIVE HEART WITHOUT ANGINA PECTORIS, UNSPECIFIED VESSEL OR LESION TYPE: ICD-10-CM

## 2023-07-26 RX ORDER — METOPROLOL SUCCINATE 25 MG/1
TABLET, EXTENDED RELEASE ORAL
Qty: 90 TABLET | Refills: 0 | Status: SHIPPED | OUTPATIENT
Start: 2023-07-26

## 2023-07-26 NOTE — TELEPHONE ENCOUNTER
Last office visit 4/19/2023       Next office visit scheduled 8/16/2023    Requested Prescriptions     Pending Prescriptions Disp Refills    metoprolol succinate (TOPROL XL) 25 MG extended release tablet [Pharmacy Med Name: Metoprolol Succinate ER 25 MG Oral Tablet Extended Release 24 Hour] 45 tablet      Sig: TAKE ONE-HALF TABLET BY  MOUTH AT NIGHT

## 2023-08-16 ENCOUNTER — OFFICE VISIT (OUTPATIENT)
Dept: FAMILY MEDICINE CLINIC | Age: 68
End: 2023-08-16
Payer: MEDICARE

## 2023-08-16 VITALS
WEIGHT: 258 LBS | HEIGHT: 74 IN | SYSTOLIC BLOOD PRESSURE: 122 MMHG | BODY MASS INDEX: 33.11 KG/M2 | DIASTOLIC BLOOD PRESSURE: 88 MMHG

## 2023-08-16 DIAGNOSIS — I25.119 CORONARY ARTERY DISEASE INVOLVING NATIVE CORONARY ARTERY OF NATIVE HEART WITH ANGINA PECTORIS (HCC): ICD-10-CM

## 2023-08-16 DIAGNOSIS — E11.65 UNCONTROLLED TYPE 2 DIABETES MELLITUS WITH HYPERGLYCEMIA (HCC): Primary | ICD-10-CM

## 2023-08-16 DIAGNOSIS — K50.90 CROHN'S DISEASE WITHOUT COMPLICATION, UNSPECIFIED GASTROINTESTINAL TRACT LOCATION (HCC): ICD-10-CM

## 2023-08-16 PROCEDURE — 1036F TOBACCO NON-USER: CPT | Performed by: FAMILY MEDICINE

## 2023-08-16 PROCEDURE — G8417 CALC BMI ABV UP PARAM F/U: HCPCS | Performed by: FAMILY MEDICINE

## 2023-08-16 PROCEDURE — 99214 OFFICE O/P EST MOD 30 MIN: CPT | Performed by: FAMILY MEDICINE

## 2023-08-16 PROCEDURE — G8427 DOCREV CUR MEDS BY ELIG CLIN: HCPCS | Performed by: FAMILY MEDICINE

## 2023-08-16 PROCEDURE — 3046F HEMOGLOBIN A1C LEVEL >9.0%: CPT | Performed by: FAMILY MEDICINE

## 2023-08-16 PROCEDURE — 3017F COLORECTAL CA SCREEN DOC REV: CPT | Performed by: FAMILY MEDICINE

## 2023-08-16 PROCEDURE — 1123F ACP DISCUSS/DSCN MKR DOCD: CPT | Performed by: FAMILY MEDICINE

## 2023-08-16 PROCEDURE — 2022F DILAT RTA XM EVC RTNOPTHY: CPT | Performed by: FAMILY MEDICINE

## 2023-08-16 RX ORDER — FLASH GLUCOSE SENSOR
1 KIT MISCELLANEOUS
Qty: 90 EACH | Refills: 1 | Status: SHIPPED | OUTPATIENT
Start: 2023-08-16

## 2023-08-16 RX ORDER — BLOOD-GLUCOSE SENSOR
1 EACH MISCELLANEOUS
Qty: 6 EACH | Refills: 1 | Status: SHIPPED | OUTPATIENT
Start: 2023-08-16 | End: 2023-11-14

## 2023-08-16 NOTE — PROGRESS NOTES
2023     Raymond Hernández (:  1955) is a 80 y.o. male, here for evaluation of the following medical concerns:    HPI  1.  DM II-  A1C is 10, poorly controlled today Takes lantus 62  at night will increase this to 65, patient has A1C that was 9.2  now up to 10.0, He hasn't been monitoring his glucose at home, not sure what else he can do? Seems frustrated, also taking glipizide 5 mg daily,  Metformin 500 BID, unable to tolerate higher dose due to Crohns,  needs eye exam and foot exam today. 2.   CAD-  NSTEMI- 2017/Ascending aortic aneurysm, follows with Dr. Yuki Waller, last visit was in , Hospitalized from 2017-8/15/2017 with NSTEMI. He underwent cardiac cath which resulted in SHAN x2 to RCA. LVEF 35% initially and follow up echo showing LVEF 55-60% (done after PCI). aneurysm- 4 cm according to note from cardiology has been stable since . 3.  Crohn's- still following with GI, needs colonoscopy and will defer to his GI doctor. Stated they have spoken concerning this and have a plan to revisit the test this year. Will follow up with GI for colonoscopy. 4. Hyperlipidemia- taking medication as prescribed, feels this is well controlled, last LDL is 27, HDL is 48, no side effects from the medication. Today, denied chest pain, sob, n, v, or diarrhea. Review of Systems   Constitutional:  Negative for activity change, fatigue, fever and unexpected weight change. HENT:  Negative for congestion, ear pain, facial swelling, hearing loss, rhinorrhea, sinus pressure, sore throat and trouble swallowing. Eyes:  Negative for discharge and visual disturbance. Respiratory:  Negative for cough, chest tightness, shortness of breath and wheezing. Cardiovascular:  Negative for chest pain, palpitations and leg swelling. Gastrointestinal:  Negative for abdominal pain, anal bleeding, blood in stool, diarrhea, nausea and vomiting.    Musculoskeletal:  Positive for

## 2023-08-19 ASSESSMENT — ENCOUNTER SYMPTOMS
NAUSEA: 0
EYE DISCHARGE: 0
BLOOD IN STOOL: 0
TROUBLE SWALLOWING: 0
RHINORRHEA: 0
COUGH: 0
DIARRHEA: 0
SINUS PRESSURE: 0
CHEST TIGHTNESS: 0
SORE THROAT: 0
ANAL BLEEDING: 0
FACIAL SWELLING: 0
VOMITING: 0
WHEEZING: 0
SHORTNESS OF BREATH: 0
ABDOMINAL PAIN: 0

## 2023-09-13 ENCOUNTER — OFFICE VISIT (OUTPATIENT)
Dept: CARDIOLOGY CLINIC | Age: 68
End: 2023-09-13
Payer: MEDICARE

## 2023-09-13 VITALS
SYSTOLIC BLOOD PRESSURE: 116 MMHG | HEART RATE: 67 BPM | BODY MASS INDEX: 33.15 KG/M2 | DIASTOLIC BLOOD PRESSURE: 62 MMHG | WEIGHT: 258.2 LBS | OXYGEN SATURATION: 92 %

## 2023-09-13 DIAGNOSIS — I25.119 CORONARY ARTERY DISEASE INVOLVING NATIVE CORONARY ARTERY OF NATIVE HEART WITH ANGINA PECTORIS (HCC): ICD-10-CM

## 2023-09-13 DIAGNOSIS — R07.9 CHEST PAIN IN ADULT: Primary | ICD-10-CM

## 2023-09-13 DIAGNOSIS — I71.21 ANEURYSM OF ASCENDING AORTA WITHOUT RUPTURE (HCC): ICD-10-CM

## 2023-09-13 LAB
ALBUMIN SERPL-MCNC: 4.2 G/DL (ref 3.4–5)
ALBUMIN/GLOB SERPL: 1.3 {RATIO} (ref 1.1–2.2)
ALP SERPL-CCNC: 110 U/L (ref 40–129)
ALT SERPL-CCNC: 21 U/L (ref 10–40)
ANION GAP SERPL CALCULATED.3IONS-SCNC: 11 MMOL/L (ref 3–16)
AST SERPL-CCNC: 13 U/L (ref 15–37)
BILIRUB SERPL-MCNC: 0.7 MG/DL (ref 0–1)
BUN SERPL-MCNC: 21 MG/DL (ref 7–20)
CALCIUM SERPL-MCNC: 9.5 MG/DL (ref 8.3–10.6)
CHLORIDE SERPL-SCNC: 104 MMOL/L (ref 99–110)
CHOLEST SERPL-MCNC: 135 MG/DL (ref 0–199)
CO2 SERPL-SCNC: 24 MMOL/L (ref 21–32)
CREAT SERPL-MCNC: 1.2 MG/DL (ref 0.8–1.3)
DEPRECATED RDW RBC AUTO: 15.1 % (ref 12.4–15.4)
GFR SERPLBLD CREATININE-BSD FMLA CKD-EPI: >60 ML/MIN/{1.73_M2}
GLUCOSE SERPL-MCNC: 293 MG/DL (ref 70–99)
HCT VFR BLD AUTO: 44.7 % (ref 40.5–52.5)
HDLC SERPL-MCNC: 50 MG/DL (ref 40–60)
HGB BLD-MCNC: 15 G/DL (ref 13.5–17.5)
LDLC SERPL CALC-MCNC: 36 MG/DL
MCH RBC QN AUTO: 29.2 PG (ref 26–34)
MCHC RBC AUTO-ENTMCNC: 33.7 G/DL (ref 31–36)
MCV RBC AUTO: 86.7 FL (ref 80–100)
PLATELET # BLD AUTO: 154 K/UL (ref 135–450)
PMV BLD AUTO: 8.7 FL (ref 5–10.5)
POTASSIUM SERPL-SCNC: 4.9 MMOL/L (ref 3.5–5.1)
PROT SERPL-MCNC: 7.5 G/DL (ref 6.4–8.2)
RBC # BLD AUTO: 5.15 M/UL (ref 4.2–5.9)
SODIUM SERPL-SCNC: 139 MMOL/L (ref 136–145)
TRIGL SERPL-MCNC: 245 MG/DL (ref 0–150)
VLDLC SERPL CALC-MCNC: 49 MG/DL
WBC # BLD AUTO: 6.2 K/UL (ref 4–11)

## 2023-09-13 PROCEDURE — 3017F COLORECTAL CA SCREEN DOC REV: CPT | Performed by: NURSE PRACTITIONER

## 2023-09-13 PROCEDURE — 99214 OFFICE O/P EST MOD 30 MIN: CPT | Performed by: NURSE PRACTITIONER

## 2023-09-13 PROCEDURE — 93000 ELECTROCARDIOGRAM COMPLETE: CPT | Performed by: NURSE PRACTITIONER

## 2023-09-13 PROCEDURE — 1036F TOBACCO NON-USER: CPT | Performed by: NURSE PRACTITIONER

## 2023-09-13 PROCEDURE — 1123F ACP DISCUSS/DSCN MKR DOCD: CPT | Performed by: NURSE PRACTITIONER

## 2023-09-13 PROCEDURE — G8417 CALC BMI ABV UP PARAM F/U: HCPCS | Performed by: NURSE PRACTITIONER

## 2023-09-13 PROCEDURE — G8427 DOCREV CUR MEDS BY ELIG CLIN: HCPCS | Performed by: NURSE PRACTITIONER

## 2023-09-13 NOTE — PATIENT INSTRUCTIONS
Instructions:     Heart Healthy Diet  Blood pressure control if  you have high blood pressure  Diabetic control if you have diabetes  Smoking cessation if you smoke  Weight loss if BMI >30  Regular exercise when OK per cardiac rehab  Stress Reduction    Call your Doctor if you have:   Increased shortness of breath, weakness, or increased fatigue  A fast or a slow heartbeat  Problems or questions with your medications  Feeling lightheaded or dizzy  Unusual swelling of lower legs/feet, chest discomfort, unusual weakness or fatigue

## 2023-10-03 ENCOUNTER — HOSPITAL ENCOUNTER (OUTPATIENT)
Dept: CT IMAGING | Age: 68
Discharge: HOME | End: 2023-10-03
Payer: MEDICARE

## 2023-10-03 DIAGNOSIS — I71.21 ANEURYSM OF ASCENDING AORTA WITHOUT RUPTURE (HCC): ICD-10-CM

## 2023-10-03 PROCEDURE — 71260 CT THORAX DX C+: CPT

## 2023-10-03 PROCEDURE — 6360000004 HC RX CONTRAST MEDICATION: Performed by: NURSE PRACTITIONER

## 2023-10-03 RX ADMIN — IOPAMIDOL 75 ML: 755 INJECTION, SOLUTION INTRAVENOUS at 11:45

## 2023-11-16 ENCOUNTER — OFFICE VISIT (OUTPATIENT)
Dept: FAMILY MEDICINE CLINIC | Age: 68
End: 2023-11-16
Payer: MEDICARE

## 2023-11-16 VITALS
DIASTOLIC BLOOD PRESSURE: 82 MMHG | WEIGHT: 261 LBS | SYSTOLIC BLOOD PRESSURE: 136 MMHG | HEIGHT: 74 IN | BODY MASS INDEX: 33.5 KG/M2

## 2023-11-16 DIAGNOSIS — I25.119 CORONARY ARTERY DISEASE INVOLVING NATIVE CORONARY ARTERY OF NATIVE HEART WITH ANGINA PECTORIS (HCC): ICD-10-CM

## 2023-11-16 DIAGNOSIS — Z12.12 ENCOUNTER FOR COLORECTAL CANCER SCREENING: ICD-10-CM

## 2023-11-16 DIAGNOSIS — Z12.11 ENCOUNTER FOR COLORECTAL CANCER SCREENING: ICD-10-CM

## 2023-11-16 DIAGNOSIS — E11.65 UNCONTROLLED TYPE 2 DIABETES MELLITUS WITH HYPERGLYCEMIA (HCC): Primary | ICD-10-CM

## 2023-11-16 LAB — HBA1C MFR BLD: 9.6 %

## 2023-11-16 PROCEDURE — 3046F HEMOGLOBIN A1C LEVEL >9.0%: CPT | Performed by: FAMILY MEDICINE

## 2023-11-16 PROCEDURE — 83036 HEMOGLOBIN GLYCOSYLATED A1C: CPT | Performed by: FAMILY MEDICINE

## 2023-11-16 PROCEDURE — 99214 OFFICE O/P EST MOD 30 MIN: CPT | Performed by: FAMILY MEDICINE

## 2023-11-16 PROCEDURE — G8427 DOCREV CUR MEDS BY ELIG CLIN: HCPCS | Performed by: FAMILY MEDICINE

## 2023-11-16 PROCEDURE — 3017F COLORECTAL CA SCREEN DOC REV: CPT | Performed by: FAMILY MEDICINE

## 2023-11-16 PROCEDURE — 1123F ACP DISCUSS/DSCN MKR DOCD: CPT | Performed by: FAMILY MEDICINE

## 2023-11-16 PROCEDURE — 1036F TOBACCO NON-USER: CPT | Performed by: FAMILY MEDICINE

## 2023-11-16 PROCEDURE — G8484 FLU IMMUNIZE NO ADMIN: HCPCS | Performed by: FAMILY MEDICINE

## 2023-11-16 PROCEDURE — 2022F DILAT RTA XM EVC RTNOPTHY: CPT | Performed by: FAMILY MEDICINE

## 2023-11-16 PROCEDURE — G8417 CALC BMI ABV UP PARAM F/U: HCPCS | Performed by: FAMILY MEDICINE

## 2023-11-16 ASSESSMENT — LIFESTYLE VARIABLES: HOW MANY STANDARD DRINKS CONTAINING ALCOHOL DO YOU HAVE ON A TYPICAL DAY: PATIENT DOES NOT DRINK

## 2023-11-16 NOTE — PROGRESS NOTES
2023     Chante Bustillo (:  1955) is a 80 y.o. male, here for evaluation of the following medical concerns:    HPI  1.  DM II-  A1C is  poorly controlled today Takes lantus 66  at night will increase this to 65, patient has A1C that was 9.6. He hasn't been monitoring his glucose at home, not sure what else he can do? Seems frustrated, also taking glipizide 5 mg daily,  Metformin 500 BID, unable to tolerate higher dose due to Crohns,  needs eye exam and foot exam today. 2.   CAD-  NSTEMI- 2017/Ascending aortic aneurysm, follows with Dr. May Medley, last visit was in , Hospitalized from 2017-8/15/2017 with NSTEMI. He underwent cardiac cath which resulted in SHAN x2 to RCA. LVEF 35% initially and follow up echo showing LVEF 55-60% (done after PCI). aneurysm- 4 cm according to note from cardiology has been stable since . 3.  Crohn's- still following with GI, needs colonoscopy and will defer to his GI doctor. Stated they have spoken concerning this and have a plan to revisit the test this year. Will follow up with GI for colonoscopy. 4. Hyperlipidemia- taking medication as prescribed, feels this is well controlled, last LDL is 27, HDL is 48, no side effects from the medication. Today, denied chest pain, sob, n, v, or diarrhea. Review of Systems   Constitutional:  Positive for fatigue. Negative for activity change, fever and unexpected weight change. HENT:  Negative for congestion, ear pain, facial swelling, hearing loss, rhinorrhea, sinus pressure, sore throat and trouble swallowing. Eyes:  Negative for visual disturbance. Respiratory:  Negative for cough, chest tightness, shortness of breath and wheezing. Cardiovascular:  Negative for chest pain. Gastrointestinal:  Negative for abdominal pain, diarrhea, nausea and vomiting. Endocrine: Negative for cold intolerance, heat intolerance, polydipsia and polyphagia.    Musculoskeletal:  Positive for

## 2023-11-19 ASSESSMENT — ENCOUNTER SYMPTOMS
SINUS PRESSURE: 0
SHORTNESS OF BREATH: 0
DIARRHEA: 0
SORE THROAT: 0
FACIAL SWELLING: 0
NAUSEA: 0
CHEST TIGHTNESS: 0
WHEEZING: 0
RHINORRHEA: 0
COUGH: 0
TROUBLE SWALLOWING: 0
ABDOMINAL PAIN: 0
VOMITING: 0

## 2023-11-30 DIAGNOSIS — I25.10 CORONARY ARTERY DISEASE INVOLVING NATIVE HEART WITHOUT ANGINA PECTORIS, UNSPECIFIED VESSEL OR LESION TYPE: ICD-10-CM

## 2023-11-30 RX ORDER — METOPROLOL SUCCINATE 25 MG/1
TABLET, EXTENDED RELEASE ORAL
Qty: 45 TABLET | Refills: 3 | Status: SHIPPED | OUTPATIENT
Start: 2023-11-30

## 2023-11-30 NOTE — TELEPHONE ENCOUNTER
Last office visit 11/16/2023       Next office visit scheduled 5/17/2024    Requested Prescriptions     Pending Prescriptions Disp Refills    metoprolol succinate (TOPROL XL) 25 MG extended release tablet [Pharmacy Med Name: Metoprolol Succinate ER 25 MG Oral Tablet Extended Release 24 Hour] 45 tablet 3     Sig: TAKE ONE-HALF TABLET BY  MOUTH AT NIGHT

## 2023-12-29 RX ORDER — PEN NEEDLE, DIABETIC 32GX 5/32"
NEEDLE, DISPOSABLE MISCELLANEOUS
Qty: 90 EACH | Refills: 0 | Status: SHIPPED | OUTPATIENT
Start: 2023-12-29

## 2024-01-30 NOTE — TELEPHONE ENCOUNTER
Called patient spoke with wife Johana. She checked his medication bottles and states he is to be taking ranolazine 500 mg  1 tablet 2 times daily.    States he has only been taking 500 mg 1 tablet daily.   States will need new RX refill if patient is to take BID.  
Please clarify with pt if he is on Ranexa 500mg or 1000 mg- he has an order for both. Find out what needs to be reordered.  Thx  
Requested Prescriptions     Pending Prescriptions Disp Refills    ranolazine (RANEXA) 1000 MG extended release tablet 60 tablet 3     Sig: Take 1 tablet by mouth 2 times daily          Number: 180    Refills: 3    Last Office Visit: 9/13/2023     Next Office Visit: 3/11/2024     Last Refill: 01/19/2023    Last Labs:  09/13/202  
GOAL: Pt will complete sit to stand transfer with (I) in 2 weeks.

## 2024-01-31 RX ORDER — RANOLAZINE 500 MG/1
500 TABLET, EXTENDED RELEASE ORAL 2 TIMES DAILY
Qty: 180 TABLET | Refills: 3 | Status: SHIPPED | OUTPATIENT
Start: 2024-01-31

## 2024-03-18 DIAGNOSIS — I25.5 ISCHEMIC CARDIOMYOPATHY: ICD-10-CM

## 2024-03-18 DIAGNOSIS — E11.65 UNCONTROLLED TYPE 2 DIABETES MELLITUS WITH HYPERGLYCEMIA (HCC): ICD-10-CM

## 2024-03-18 DIAGNOSIS — K50.90 CROHN'S DISEASE WITHOUT COMPLICATION, UNSPECIFIED GASTROINTESTINAL TRACT LOCATION (HCC): ICD-10-CM

## 2024-03-18 DIAGNOSIS — I71.21 ASCENDING AORTIC ANEURYSM (HCC): ICD-10-CM

## 2024-03-18 DIAGNOSIS — I21.4 NSTEMI (NON-ST ELEVATED MYOCARDIAL INFARCTION) (HCC): ICD-10-CM

## 2024-03-18 DIAGNOSIS — I25.119 CORONARY ARTERY DISEASE INVOLVING NATIVE CORONARY ARTERY OF NATIVE HEART WITH ANGINA PECTORIS (HCC): ICD-10-CM

## 2024-03-18 RX ORDER — FUROSEMIDE 20 MG/1
TABLET ORAL
Qty: 90 TABLET | Refills: 3 | Status: SHIPPED | OUTPATIENT
Start: 2024-03-18

## 2024-03-18 RX ORDER — GLIPIZIDE 5 MG/1
5 TABLET, FILM COATED, EXTENDED RELEASE ORAL DAILY
Qty: 90 TABLET | Refills: 3 | Status: SHIPPED | OUTPATIENT
Start: 2024-03-18

## 2024-03-18 RX ORDER — PEN NEEDLE, DIABETIC 32GX 5/32"
NEEDLE, DISPOSABLE MISCELLANEOUS
Qty: 90 EACH | Refills: 0 | Status: SHIPPED | OUTPATIENT
Start: 2024-03-18

## 2024-03-18 RX ORDER — INSULIN GLARGINE 100 [IU]/ML
INJECTION, SOLUTION SUBCUTANEOUS
Qty: 60 ML | Refills: 3 | Status: SHIPPED | OUTPATIENT
Start: 2024-03-18

## 2024-03-18 NOTE — TELEPHONE ENCOUNTER
Last office visit 11/16/2023       Next office visit scheduled 5/17/2024    Requested Prescriptions     Pending Prescriptions Disp Refills    LANTUS SOLOSTAR 100 UNIT/ML injection pen [Pharmacy Med Name: Lantus SoloStar 100 UNIT/ML Subcutaneous Solution Pen-injector] 60 mL 3     Sig: INJECT SUBCUTANEOUSLY 58  UNITS AT NIGHT    furosemide (LASIX) 20 MG tablet [Pharmacy Med Name: Furosemide 20 MG Oral Tablet] 90 tablet 3     Sig: TAKE 1 TABLET BY MOUTH  DAILY    glipiZIDE (GLUCOTROL XL) 5 MG extended release tablet [Pharmacy Med Name: glipiZIDE ER 5 MG Oral Tablet Extended Release 24 Hour] 90 tablet 3     Sig: TAKE 1 TABLET BY MOUTH  DAILY    BD PEN NEEDLE ALTHEA 2ND GEN 32G X 4 MM MISC [Pharmacy Med Name: [id number]] 90 each 0     Sig: USE 1 NEEDLE IN THE MORNING  BEFORE BREAKFAST

## 2024-04-04 ENCOUNTER — HOSPITAL ENCOUNTER (OUTPATIENT)
Dept: CT IMAGING | Age: 69
Discharge: HOME | End: 2024-04-04
Payer: MEDICARE

## 2024-04-04 DIAGNOSIS — I25.119 CORONARY ARTERY DISEASE INVOLVING NATIVE CORONARY ARTERY OF NATIVE HEART WITH ANGINA PECTORIS (HCC): ICD-10-CM

## 2024-04-04 DIAGNOSIS — I71.21 ANEURYSM OF ASCENDING AORTA WITHOUT RUPTURE (HCC): ICD-10-CM

## 2024-04-04 DIAGNOSIS — R07.9 CHEST PAIN IN ADULT: ICD-10-CM

## 2024-04-04 LAB
PERFORMED ON: NORMAL
POC CREATININE: 1.1 MG/DL (ref 0.8–1.3)
POC SAMPLE TYPE: NORMAL

## 2024-04-04 PROCEDURE — 82565 ASSAY OF CREATININE: CPT

## 2024-04-04 PROCEDURE — 71260 CT THORAX DX C+: CPT

## 2024-04-04 PROCEDURE — 6360000004 HC RX CONTRAST MEDICATION: Performed by: NURSE PRACTITIONER

## 2024-04-04 RX ADMIN — IOPAMIDOL 75 ML: 755 INJECTION, SOLUTION INTRAVENOUS at 11:36

## 2024-04-08 ENCOUNTER — OFFICE VISIT (OUTPATIENT)
Dept: CARDIOLOGY CLINIC | Age: 69
End: 2024-04-08
Payer: MEDICARE

## 2024-04-08 VITALS
DIASTOLIC BLOOD PRESSURE: 72 MMHG | BODY MASS INDEX: 33.62 KG/M2 | OXYGEN SATURATION: 97 % | WEIGHT: 262 LBS | SYSTOLIC BLOOD PRESSURE: 120 MMHG | HEART RATE: 72 BPM | HEIGHT: 74 IN

## 2024-04-08 DIAGNOSIS — I50.20 SYSTOLIC HEART FAILURE, UNSPECIFIED HF CHRONICITY (HCC): Primary | ICD-10-CM

## 2024-04-08 DIAGNOSIS — I38 CHRONIC DIASTOLIC HEART FAILURE DUE TO VALVULAR DISEASE (HCC): ICD-10-CM

## 2024-04-08 DIAGNOSIS — I25.119 CORONARY ARTERY DISEASE INVOLVING NATIVE CORONARY ARTERY OF NATIVE HEART WITH ANGINA PECTORIS (HCC): ICD-10-CM

## 2024-04-08 DIAGNOSIS — I50.32 CHRONIC DIASTOLIC HEART FAILURE DUE TO VALVULAR DISEASE (HCC): ICD-10-CM

## 2024-04-08 PROCEDURE — 1036F TOBACCO NON-USER: CPT | Performed by: NURSE PRACTITIONER

## 2024-04-08 PROCEDURE — 99214 OFFICE O/P EST MOD 30 MIN: CPT | Performed by: NURSE PRACTITIONER

## 2024-04-08 PROCEDURE — G8417 CALC BMI ABV UP PARAM F/U: HCPCS | Performed by: NURSE PRACTITIONER

## 2024-04-08 PROCEDURE — G8427 DOCREV CUR MEDS BY ELIG CLIN: HCPCS | Performed by: NURSE PRACTITIONER

## 2024-04-08 PROCEDURE — 93000 ELECTROCARDIOGRAM COMPLETE: CPT | Performed by: NURSE PRACTITIONER

## 2024-04-08 PROCEDURE — 1123F ACP DISCUSS/DSCN MKR DOCD: CPT | Performed by: NURSE PRACTITIONER

## 2024-04-08 PROCEDURE — 3017F COLORECTAL CA SCREEN DOC REV: CPT | Performed by: NURSE PRACTITIONER

## 2024-04-08 NOTE — PROGRESS NOTES
[facility]  [place] [address]  [phone]    PrimaryCare Doctor:  Edwin Bond DO  Primary Cardiologist: Dr. Machado    Chief Complaint   Patient presents with    Check-Up     I think I don't breathe as good as I should since I just stopped smoking 5 years ago.       History of Present Illness:  Adán Augustin is a 68 y.o. male with PMH significant for CAD/MI s/p  ascending aortic aneurysm, SHF, crohn's dz, DM, small bowel rsection, pancreatitis, peritonitis     Adm [facility] w Chest pain 12/15-12/16/2022. Associated with SOB, abd pain, nausea and diarrhea.  EKG and Trop neg.  LHC with non obstructive CAD. L CX 70% lesion- started on ranexa.    Patient presents to [facility] cardiology for follow up for CAD.  \"Harriet been prety good.\"  Denies CP  He has chronic SOB that he attributes to his hx of smoking. Smoked for ~ 50 yrs- quit 2019.  Denies any weight gain- wt fluctuates 260-270lbs    Follows with PCP for DM- says it has been controlled.    Review of Systems:   General: Denies fever, chills, fatigue, weakness  Skin: Denies skin changes, rash, itching, lesions.  HEENT: Denies headache, dizziness, vision changes, nosebleeds, sore throat, nasal drainage  RESP: Denies cough, sputum, dyspnea, wheeze, snoring  CARD: Denies palpitations,  murmur  GI:Denies nausea, vomiting, heartburn, loss of appetite, change in bowels  : Denies frequency, pain, incontinence, polyuria  VASC: Denies claudication, leg cramps, clots  MUSC/SKEL: Denies pain, stiffness, arthritis  PSYCH: Denies anxiety, depression, stress  NEURO: Denies numbness, tingling, weakness,change in mood or memory  HEME: Denies abn bruising, bleeding, anemia  ENDO: Denies intolerance to heat, cold, excessive thirst or hunger, hx thyroid disease    /72   Pulse 72   Ht 1.88 m (6' 2.02\")   Wt 118.8 kg (262 lb)   SpO2 97%   BMI 33.62 kg/m²   Wt Readings from Last 3 Encounters:   04/08/24 118.8 kg

## 2024-04-28 ENCOUNTER — HOSPITAL ENCOUNTER (OUTPATIENT)
Age: 69
Setting detail: OBSERVATION
Discharge: HOME | End: 2024-04-29
Attending: EMERGENCY MEDICINE | Admitting: INTERNAL MEDICINE
Payer: MEDICARE

## 2024-04-28 ENCOUNTER — APPOINTMENT (OUTPATIENT)
Dept: CT IMAGING | Age: 69
End: 2024-04-28
Payer: MEDICARE

## 2024-04-28 ENCOUNTER — APPOINTMENT (OUTPATIENT)
Dept: GENERAL RADIOLOGY | Age: 69
End: 2024-04-28
Payer: MEDICARE

## 2024-04-28 DIAGNOSIS — H53.2 DIPLOPIA: Primary | ICD-10-CM

## 2024-04-28 LAB
ALBUMIN SERPL-MCNC: 4 G/DL (ref 3.4–5)
ALBUMIN/GLOB SERPL: 1.2 {RATIO} (ref 1.1–2.2)
ALP SERPL-CCNC: 96 U/L (ref 40–129)
ALT SERPL-CCNC: 26 U/L (ref 10–40)
ANION GAP SERPL CALCULATED.3IONS-SCNC: 12 MMOL/L (ref 3–16)
AST SERPL-CCNC: 21 U/L (ref 15–37)
BASOPHILS # BLD: 0.1 K/UL (ref 0–0.2)
BASOPHILS NFR BLD: 1.5 %
BILIRUB SERPL-MCNC: 0.5 MG/DL (ref 0–1)
BUN SERPL-MCNC: 23 MG/DL (ref 7–20)
CALCIUM SERPL-MCNC: 9.3 MG/DL (ref 8.3–10.6)
CHLORIDE SERPL-SCNC: 104 MMOL/L (ref 99–110)
CO2 SERPL-SCNC: 22 MMOL/L (ref 21–32)
CREAT SERPL-MCNC: 1.1 MG/DL (ref 0.8–1.3)
CRP SERPL-MCNC: <3 MG/L (ref 0–5.1)
DEPRECATED RDW RBC AUTO: 14.1 % (ref 12.4–15.4)
EOSINOPHIL # BLD: 0.2 K/UL (ref 0–0.6)
EOSINOPHIL NFR BLD: 2.8 %
ERYTHROCYTE [SEDIMENTATION RATE] IN BLOOD BY WESTERGREN METHOD: 26 MM/HR (ref 0–20)
GFR SERPLBLD CREATININE-BSD FMLA CKD-EPI: 73 ML/MIN/{1.73_M2}
GLUCOSE BLD-MCNC: 196 MG/DL (ref 70–99)
GLUCOSE BLD-MCNC: 223 MG/DL (ref 70–99)
GLUCOSE SERPL-MCNC: 344 MG/DL (ref 70–99)
HCT VFR BLD AUTO: 44.7 % (ref 40.5–52.5)
HGB BLD-MCNC: 14.9 G/DL (ref 13.5–17.5)
INR PPP: 0.99 (ref 0.85–1.15)
LYMPHOCYTES # BLD: 2.1 K/UL (ref 1–5.1)
LYMPHOCYTES NFR BLD: 35.9 %
MCH RBC QN AUTO: 28.8 PG (ref 26–34)
MCHC RBC AUTO-ENTMCNC: 33.3 G/DL (ref 31–36)
MCV RBC AUTO: 86.5 FL (ref 80–100)
MONOCYTES # BLD: 0.5 K/UL (ref 0–1.3)
MONOCYTES NFR BLD: 9.4 %
NEUTROPHILS # BLD: 2.9 K/UL (ref 1.7–7.7)
NEUTROPHILS NFR BLD: 50.4 %
PERFORMED ON: ABNORMAL
PERFORMED ON: ABNORMAL
PLATELET # BLD AUTO: 147 K/UL (ref 135–450)
PMV BLD AUTO: 8.2 FL (ref 5–10.5)
POTASSIUM SERPL-SCNC: 4.6 MMOL/L (ref 3.5–5.1)
PROT SERPL-MCNC: 7.4 G/DL (ref 6.4–8.2)
PROTHROMBIN TIME: 13.3 SEC (ref 11.9–14.9)
RBC # BLD AUTO: 5.17 M/UL (ref 4.2–5.9)
SODIUM SERPL-SCNC: 138 MMOL/L (ref 136–145)
TROPONIN, HIGH SENSITIVITY: 13 NG/L (ref 0–22)
WBC # BLD AUTO: 5.7 K/UL (ref 4–11)

## 2024-04-28 PROCEDURE — G0378 HOSPITAL OBSERVATION PER HR: HCPCS

## 2024-04-28 PROCEDURE — 70498 CT ANGIOGRAPHY NECK: CPT

## 2024-04-28 PROCEDURE — 70450 CT HEAD/BRAIN W/O DYE: CPT

## 2024-04-28 PROCEDURE — 6370000000 HC RX 637 (ALT 250 FOR IP): Performed by: INTERNAL MEDICINE

## 2024-04-28 PROCEDURE — 85652 RBC SED RATE AUTOMATED: CPT

## 2024-04-28 PROCEDURE — 86042 ACETYLCHOLN RCPTR BLCKG ANTB: CPT

## 2024-04-28 PROCEDURE — 93005 ELECTROCARDIOGRAM TRACING: CPT | Performed by: PHYSICIAN ASSISTANT

## 2024-04-28 PROCEDURE — 86140 C-REACTIVE PROTEIN: CPT

## 2024-04-28 PROCEDURE — 85610 PROTHROMBIN TIME: CPT

## 2024-04-28 PROCEDURE — 6370000000 HC RX 637 (ALT 250 FOR IP): Performed by: PHYSICIAN ASSISTANT

## 2024-04-28 PROCEDURE — 71045 X-RAY EXAM CHEST 1 VIEW: CPT

## 2024-04-28 PROCEDURE — 80053 COMPREHEN METABOLIC PANEL: CPT

## 2024-04-28 PROCEDURE — 2580000003 HC RX 258: Performed by: INTERNAL MEDICINE

## 2024-04-28 PROCEDURE — 99285 EMERGENCY DEPT VISIT HI MDM: CPT

## 2024-04-28 PROCEDURE — 6360000004 HC RX CONTRAST MEDICATION: Performed by: PHYSICIAN ASSISTANT

## 2024-04-28 PROCEDURE — 84484 ASSAY OF TROPONIN QUANT: CPT

## 2024-04-28 PROCEDURE — 36415 COLL VENOUS BLD VENIPUNCTURE: CPT

## 2024-04-28 PROCEDURE — 85025 COMPLETE CBC W/AUTO DIFF WBC: CPT

## 2024-04-28 PROCEDURE — 86041 ACETYLCHOLN RCPTR BNDNG ANTB: CPT

## 2024-04-28 RX ORDER — SODIUM CHLORIDE 9 MG/ML
INJECTION, SOLUTION INTRAVENOUS PRN
Status: DISCONTINUED | OUTPATIENT
Start: 2024-04-28 | End: 2024-04-29 | Stop reason: HOSPADM

## 2024-04-28 RX ORDER — INSULIN GLARGINE 100 [IU]/ML
68 INJECTION, SOLUTION SUBCUTANEOUS NIGHTLY
Status: DISCONTINUED | OUTPATIENT
Start: 2024-04-28 | End: 2024-04-29 | Stop reason: HOSPADM

## 2024-04-28 RX ORDER — ASPIRIN 300 MG/1
300 SUPPOSITORY RECTAL DAILY
Status: DISCONTINUED | OUTPATIENT
Start: 2024-04-29 | End: 2024-04-28

## 2024-04-28 RX ORDER — NITROGLYCERIN 0.4 MG/1
0.4 TABLET SUBLINGUAL EVERY 5 MIN PRN
Status: DISCONTINUED | OUTPATIENT
Start: 2024-04-28 | End: 2024-04-29 | Stop reason: HOSPADM

## 2024-04-28 RX ORDER — DEXTROSE MONOHYDRATE 100 MG/ML
INJECTION, SOLUTION INTRAVENOUS CONTINUOUS PRN
Status: DISCONTINUED | OUTPATIENT
Start: 2024-04-28 | End: 2024-04-29 | Stop reason: HOSPADM

## 2024-04-28 RX ORDER — ATORVASTATIN CALCIUM 40 MG/1
40 TABLET, FILM COATED ORAL NIGHTLY
Status: DISCONTINUED | OUTPATIENT
Start: 2024-04-28 | End: 2024-04-29 | Stop reason: HOSPADM

## 2024-04-28 RX ORDER — ASPIRIN 81 MG/1
81 TABLET, CHEWABLE ORAL DAILY
Status: DISCONTINUED | OUTPATIENT
Start: 2024-04-29 | End: 2024-04-29 | Stop reason: HOSPADM

## 2024-04-28 RX ORDER — ONDANSETRON 2 MG/ML
4 INJECTION INTRAMUSCULAR; INTRAVENOUS EVERY 6 HOURS PRN
Status: DISCONTINUED | OUTPATIENT
Start: 2024-04-28 | End: 2024-04-29 | Stop reason: HOSPADM

## 2024-04-28 RX ORDER — ENOXAPARIN SODIUM 100 MG/ML
30 INJECTION SUBCUTANEOUS 2 TIMES DAILY
Status: DISCONTINUED | OUTPATIENT
Start: 2024-04-29 | End: 2024-04-29 | Stop reason: HOSPADM

## 2024-04-28 RX ORDER — POLYETHYLENE GLYCOL 3350 17 G/17G
17 POWDER, FOR SOLUTION ORAL DAILY PRN
Status: DISCONTINUED | OUTPATIENT
Start: 2024-04-28 | End: 2024-04-29 | Stop reason: HOSPADM

## 2024-04-28 RX ORDER — ONDANSETRON 4 MG/1
4 TABLET, ORALLY DISINTEGRATING ORAL EVERY 8 HOURS PRN
Status: DISCONTINUED | OUTPATIENT
Start: 2024-04-28 | End: 2024-04-29 | Stop reason: HOSPADM

## 2024-04-28 RX ORDER — FUROSEMIDE 20 MG/1
20 TABLET ORAL DAILY
Status: DISCONTINUED | OUTPATIENT
Start: 2024-04-29 | End: 2024-04-29 | Stop reason: HOSPADM

## 2024-04-28 RX ORDER — HYDROCODONE BITARTRATE AND ACETAMINOPHEN 10; 325 MG/1; MG/1
1 TABLET ORAL EVERY 6 HOURS PRN
Status: DISCONTINUED | OUTPATIENT
Start: 2024-04-28 | End: 2024-04-29 | Stop reason: HOSPADM

## 2024-04-28 RX ORDER — INSULIN LISPRO 100 [IU]/ML
0-16 INJECTION, SOLUTION INTRAVENOUS; SUBCUTANEOUS
Status: DISCONTINUED | OUTPATIENT
Start: 2024-04-29 | End: 2024-04-29 | Stop reason: HOSPADM

## 2024-04-28 RX ORDER — CLOPIDOGREL BISULFATE 75 MG/1
75 TABLET ORAL DAILY
Status: DISCONTINUED | OUTPATIENT
Start: 2024-04-28 | End: 2024-04-28

## 2024-04-28 RX ORDER — RANOLAZINE 500 MG/1
500 TABLET, EXTENDED RELEASE ORAL 2 TIMES DAILY
Status: DISCONTINUED | OUTPATIENT
Start: 2024-04-28 | End: 2024-04-29 | Stop reason: HOSPADM

## 2024-04-28 RX ORDER — ATORVASTATIN CALCIUM 80 MG/1
80 TABLET, FILM COATED ORAL NIGHTLY
Status: DISCONTINUED | OUTPATIENT
Start: 2024-04-28 | End: 2024-04-28

## 2024-04-28 RX ORDER — TETRACAINE HYDROCHLORIDE 5 MG/ML
1 SOLUTION OPHTHALMIC ONCE
Status: COMPLETED | OUTPATIENT
Start: 2024-04-28 | End: 2024-04-28

## 2024-04-28 RX ORDER — METOPROLOL SUCCINATE 25 MG/1
25 TABLET, EXTENDED RELEASE ORAL NIGHTLY
Status: DISCONTINUED | OUTPATIENT
Start: 2024-04-28 | End: 2024-04-29 | Stop reason: HOSPADM

## 2024-04-28 RX ORDER — SODIUM CHLORIDE 0.9 % (FLUSH) 0.9 %
5-40 SYRINGE (ML) INJECTION EVERY 12 HOURS SCHEDULED
Status: DISCONTINUED | OUTPATIENT
Start: 2024-04-28 | End: 2024-04-29 | Stop reason: HOSPADM

## 2024-04-28 RX ORDER — SODIUM CHLORIDE 0.9 % (FLUSH) 0.9 %
5-40 SYRINGE (ML) INJECTION PRN
Status: DISCONTINUED | OUTPATIENT
Start: 2024-04-28 | End: 2024-04-29 | Stop reason: HOSPADM

## 2024-04-28 RX ORDER — LABETALOL HYDROCHLORIDE 5 MG/ML
10 INJECTION, SOLUTION INTRAVENOUS EVERY 10 MIN PRN
Status: DISCONTINUED | OUTPATIENT
Start: 2024-04-28 | End: 2024-04-28

## 2024-04-28 RX ORDER — GLUCAGON 1 MG/ML
1 KIT INJECTION PRN
Status: DISCONTINUED | OUTPATIENT
Start: 2024-04-28 | End: 2024-04-29 | Stop reason: HOSPADM

## 2024-04-28 RX ORDER — MESALAMINE 500 MG/1
1000 CAPSULE, EXTENDED RELEASE ORAL 2 TIMES DAILY
Status: DISCONTINUED | OUTPATIENT
Start: 2024-04-29 | End: 2024-04-29 | Stop reason: HOSPADM

## 2024-04-28 RX ORDER — INSULIN LISPRO 100 [IU]/ML
0-4 INJECTION, SOLUTION INTRAVENOUS; SUBCUTANEOUS NIGHTLY
Status: DISCONTINUED | OUTPATIENT
Start: 2024-04-28 | End: 2024-04-29 | Stop reason: HOSPADM

## 2024-04-28 RX ADMIN — RANOLAZINE 500 MG: 500 TABLET, FILM COATED, EXTENDED RELEASE ORAL at 23:47

## 2024-04-28 RX ADMIN — TETRACAINE HYDROCHLORIDE 1 DROP: 5 SOLUTION OPHTHALMIC at 18:20

## 2024-04-28 RX ADMIN — ATORVASTATIN CALCIUM 80 MG: 40 TABLET, FILM COATED ORAL at 21:16

## 2024-04-28 RX ADMIN — CLOPIDOGREL BISULFATE 75 MG: 75 TABLET ORAL at 21:16

## 2024-04-28 RX ADMIN — INSULIN GLARGINE 68 UNITS: 100 INJECTION, SOLUTION SUBCUTANEOUS at 21:35

## 2024-04-28 RX ADMIN — METOPROLOL SUCCINATE 25 MG: 25 TABLET, FILM COATED, EXTENDED RELEASE ORAL at 23:46

## 2024-04-28 RX ADMIN — IOPAMIDOL 75 ML: 755 INJECTION, SOLUTION INTRAVENOUS at 14:51

## 2024-04-28 RX ADMIN — SODIUM CHLORIDE, PRESERVATIVE FREE 10 ML: 5 INJECTION INTRAVENOUS at 23:52

## 2024-04-28 ASSESSMENT — PAIN DESCRIPTION - ORIENTATION: ORIENTATION: LEFT

## 2024-04-28 ASSESSMENT — PAIN DESCRIPTION - PAIN TYPE: TYPE: ACUTE PAIN

## 2024-04-28 ASSESSMENT — PAIN SCALES - GENERAL
PAINLEVEL_OUTOF10: 4
PAINLEVEL_OUTOF10: 2
PAINLEVEL_OUTOF10: 5

## 2024-04-28 ASSESSMENT — PAIN - FUNCTIONAL ASSESSMENT
PAIN_FUNCTIONAL_ASSESSMENT: 0-10
PAIN_FUNCTIONAL_ASSESSMENT: PREVENTS OR INTERFERES SOME ACTIVE ACTIVITIES AND ADLS

## 2024-04-28 ASSESSMENT — PAIN DESCRIPTION - LOCATION: LOCATION: EYE

## 2024-04-28 ASSESSMENT — PAIN DESCRIPTION - DESCRIPTORS: DESCRIPTORS: ACHING;PRESSURE

## 2024-04-28 ASSESSMENT — PAIN DESCRIPTION - FREQUENCY: FREQUENCY: CONTINUOUS

## 2024-04-28 NOTE — ED PROVIDER NOTES
IntraVENous Given 4/28/24 2141)   tetracaine (TETRAVISC) 0.5 % ophthalmic solution 1 drop (1 drop Left Eye Given 4/28/24 0920)       Current Discharge Medication List          SEP-1 CORE MEASURE DATA  Exclusion criteria: the patient is NOT to be included for sepsis due to:  Infection is not suspected    Patient remained stable in the ED. CTA was negative.  Chest x-ray was negative.  CT scan of the head revealed opacification of the ethmoid sinus but otherwise no signs of stroke.  Remainder of his workup was unremarkable.  Therefore, patient is admitted to the hospital for further evaluation with MRI and rule out CVA or other intracranial problems.  We spoke to ophthalmology and they felt that patient did not have any reason to be evaluated by them tonight.  Therefore, patient was admitted to the hospitalist.  Physician assistant notified the hospitalist.    Diagnostic considerations include but are not limited to:  Iritis, Uveitis, Conjunctivitis, Herpes Keratitis, Corneal Ulcer, Corneal Abrasion, Acute Angle Glaucoma, Orbital Cellulitis/Abscess, Periorbital/Preseptal Cellulitis, other.    EKG-ordered to rule out myocardial infarction, rhythm disturbances, conduction disturbances, LVH, CECY, pericarditis, voltage abnormalities, or other pathology that might be causing the patient's symptoms.    CT head-CT scan of the head was ordered to rule out stroke, intracranial hemorrhage, thrombotic stroke, embolic stroke, mass lesions, cerebral contusion, intracranial infection or abscesses.    Chest x-ray-chest x-ray was ordered to rule out pneumonia, pneumothorax, congestive heart failure, cardiomegaly, chest masses, aortic aneurysm, hiatal hernia, rib fractures, or any other pathology that might be causing the patient's symptoms    CBC-CBC was ordered to rule out anemia, infection, abnormal platelet count, polycythemia, abnormal Red cell pathology, or any other pathology that might be causing the patient's 
due to severe sepsis or septic shock?   No   Exclusion criteria - the patient is NOT to be included for SEP-1 Core Measure due to:  Infection is not suspected    Chronic Conditions affecting care:    has a past medical history of Aneurysm of ascending aorta (HCC), CAD (coronary artery disease) (08/2017), Chronic diastolic CHF (congestive heart failure), NYHA class 2 (HCC), Crohn's disease (HCC), Diabetes mellitus (HCC), Fistula of intestine, History of resection of small bowel, MRSA colonization (10/08/2020), Pancreatitis, and Peritonitis (HCC).    CONSULTS: (Who and What was discussed)  IP CONSULT TO OPHTHALMOLOGY      Social Determinants : None    Records Reviewed (Source):     CC/HPI Summary, DDx, ED Course, and Reassessment:     Patient presents ED with HPI noted above.    Physical exam as above.  Symptoms began Thursday morning.  CT without contrast showed no acute intercranial abnormality.  CTA head and neck showed no acute abnormality.  Consultation made to ophthalmology.  Dr. Lucio kindly spoke with me.  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  Recommended assessing for GCA.  Sed rate and CRP obtained and pending.  Our plan is to admit patient for further workup and evaluation.  Ophthalmology comfortable with following up as outpatient. Patient seen and evaluated in ED with Dr. Dempsey. Patient comfortable with plan. Patient admitted in stable condition. Consultation made to hopsitalist regarding admission.     Disposition Considerations (tests considered but not done, Admit vs D/C, Shared Decision Making, Pt Expectation of Test or Tx.): see above       I am the Primary Clinician of Record.  FINAL IMPRESSION      1. Diplopia          DISPOSITION/PLAN     DISPOSITION Decision To Admit 04/28/2024 05:46:55 PM      PATIENT REFERRED TO:  No follow-up provider specified.    DISCHARGE MEDICATIONS:  New Prescriptions    No medications on file       DISCONTINUED MEDICATIONS:  Discontinued

## 2024-04-29 ENCOUNTER — APPOINTMENT (OUTPATIENT)
Dept: MRI IMAGING | Age: 69
End: 2024-04-29
Payer: MEDICARE

## 2024-04-29 VITALS
BODY MASS INDEX: 33.61 KG/M2 | SYSTOLIC BLOOD PRESSURE: 126 MMHG | HEART RATE: 59 BPM | TEMPERATURE: 98.3 F | WEIGHT: 261.91 LBS | OXYGEN SATURATION: 95 % | RESPIRATION RATE: 17 BRPM | HEIGHT: 74 IN | DIASTOLIC BLOOD PRESSURE: 70 MMHG

## 2024-04-29 LAB
CHOLEST SERPL-MCNC: 128 MG/DL (ref 0–199)
DEPRECATED RDW RBC AUTO: 14.3 % (ref 12.4–15.4)
EKG ATRIAL RATE: 69 BPM
EKG DIAGNOSIS: NORMAL
EKG P AXIS: 38 DEGREES
EKG P-R INTERVAL: 176 MS
EKG Q-T INTERVAL: 384 MS
EKG QRS DURATION: 98 MS
EKG QTC CALCULATION (BAZETT): 411 MS
EKG R AXIS: 40 DEGREES
EKG T AXIS: 9 DEGREES
EKG VENTRICULAR RATE: 69 BPM
EST. AVERAGE GLUCOSE BLD GHB EST-MCNC: 231.7 MG/DL
GLUCOSE BLD-MCNC: 149 MG/DL (ref 70–99)
GLUCOSE BLD-MCNC: 183 MG/DL (ref 70–99)
GLUCOSE BLD-MCNC: 238 MG/DL (ref 70–99)
HBA1C MFR BLD: 9.7 %
HCT VFR BLD AUTO: 42.1 % (ref 40.5–52.5)
HDLC SERPL-MCNC: 45 MG/DL (ref 40–60)
HGB BLD-MCNC: 14.4 G/DL (ref 13.5–17.5)
LDLC SERPL CALC-MCNC: ABNORMAL MG/DL
LDLC SERPL-MCNC: 49 MG/DL
MCH RBC QN AUTO: 29.3 PG (ref 26–34)
MCHC RBC AUTO-ENTMCNC: 34.2 G/DL (ref 31–36)
MCV RBC AUTO: 85.9 FL (ref 80–100)
PERFORMED ON: ABNORMAL
PLATELET # BLD AUTO: 140 K/UL (ref 135–450)
PMV BLD AUTO: 8.2 FL (ref 5–10.5)
RBC # BLD AUTO: 4.9 M/UL (ref 4.2–5.9)
REASON FOR REJECTION: NORMAL
REJECTED TEST: NORMAL
T3FREE SERPL-MCNC: 2.3 PG/ML (ref 2.3–4.2)
T4 FREE SERPL-MCNC: 1 NG/DL (ref 0.9–1.8)
TRIGL SERPL-MCNC: 332 MG/DL (ref 0–150)
TSH SERPL DL<=0.005 MIU/L-ACNC: 1.39 UIU/ML (ref 0.27–4.2)
VLDLC SERPL CALC-MCNC: ABNORMAL MG/DL
WBC # BLD AUTO: 7.1 K/UL (ref 4–11)

## 2024-04-29 PROCEDURE — 97165 OT EVAL LOW COMPLEX 30 MIN: CPT

## 2024-04-29 PROCEDURE — 6360000004 HC RX CONTRAST MEDICATION: Performed by: FAMILY MEDICINE

## 2024-04-29 PROCEDURE — 70553 MRI BRAIN STEM W/O & W/DYE: CPT

## 2024-04-29 PROCEDURE — 93010 ELECTROCARDIOGRAM REPORT: CPT | Performed by: INTERNAL MEDICINE

## 2024-04-29 PROCEDURE — 36415 COLL VENOUS BLD VENIPUNCTURE: CPT

## 2024-04-29 PROCEDURE — 96372 THER/PROPH/DIAG INJ SC/IM: CPT

## 2024-04-29 PROCEDURE — 6360000002 HC RX W HCPCS: Performed by: INTERNAL MEDICINE

## 2024-04-29 PROCEDURE — 94760 N-INVAS EAR/PLS OXIMETRY 1: CPT

## 2024-04-29 PROCEDURE — A9579 GAD-BASE MR CONTRAST NOS,1ML: HCPCS | Performed by: FAMILY MEDICINE

## 2024-04-29 PROCEDURE — 84443 ASSAY THYROID STIM HORMONE: CPT

## 2024-04-29 PROCEDURE — 80061 LIPID PANEL: CPT

## 2024-04-29 PROCEDURE — 84481 FREE ASSAY (FT-3): CPT

## 2024-04-29 PROCEDURE — 6370000000 HC RX 637 (ALT 250 FOR IP): Performed by: INTERNAL MEDICINE

## 2024-04-29 PROCEDURE — G0378 HOSPITAL OBSERVATION PER HR: HCPCS

## 2024-04-29 PROCEDURE — 83036 HEMOGLOBIN GLYCOSYLATED A1C: CPT

## 2024-04-29 PROCEDURE — 84439 ASSAY OF FREE THYROXINE: CPT

## 2024-04-29 PROCEDURE — 85027 COMPLETE CBC AUTOMATED: CPT

## 2024-04-29 PROCEDURE — 86043 ACETYLCHOLN RCPTR MODLG ANTB: CPT

## 2024-04-29 RX ORDER — ASPIRIN 81 MG/1
81 TABLET, CHEWABLE ORAL DAILY
Qty: 21 TABLET | Refills: 0 | Status: SHIPPED | OUTPATIENT
Start: 2024-04-29 | End: 2024-05-20

## 2024-04-29 RX ORDER — CLOPIDOGREL BISULFATE 75 MG/1
75 TABLET ORAL DAILY
Qty: 21 TABLET | Refills: 0 | Status: SHIPPED | OUTPATIENT
Start: 2024-04-29 | End: 2024-05-20

## 2024-04-29 RX ORDER — ASPIRIN 325 MG
325 TABLET ORAL DAILY
Qty: 30 TABLET | Refills: 3 | Status: SHIPPED | OUTPATIENT
Start: 2024-05-21

## 2024-04-29 RX ADMIN — RANOLAZINE 500 MG: 500 TABLET, FILM COATED, EXTENDED RELEASE ORAL at 08:19

## 2024-04-29 RX ADMIN — FUROSEMIDE 20 MG: 20 TABLET ORAL at 08:18

## 2024-04-29 RX ADMIN — MESALAMINE 1000 MG: 500 CAPSULE, EXTENDED RELEASE ORAL at 08:19

## 2024-04-29 RX ADMIN — INSULIN LISPRO 4 UNITS: 100 INJECTION, SOLUTION INTRAVENOUS; SUBCUTANEOUS at 14:04

## 2024-04-29 RX ADMIN — EMPAGLIFLOZIN 10 MG: 10 TABLET, FILM COATED ORAL at 08:18

## 2024-04-29 RX ADMIN — ENOXAPARIN SODIUM 30 MG: 100 INJECTION SUBCUTANEOUS at 08:18

## 2024-04-29 RX ADMIN — GADOTERIDOL 20 ML: 279.3 INJECTION, SOLUTION INTRAVENOUS at 16:07

## 2024-04-29 RX ADMIN — MESALAMINE 1000 MG: 500 CAPSULE, EXTENDED RELEASE ORAL at 00:27

## 2024-04-29 RX ADMIN — ASPIRIN 81 MG: 81 TABLET, CHEWABLE ORAL at 08:19

## 2024-04-29 NOTE — H&P
tablet Take 1 tablet by mouth 2 times daily 1/31/24   Siri Dasilva APRN - CNP   metoprolol succinate (TOPROL XL) 25 MG extended release tablet TAKE ONE-HALF TABLET BY MOUTH AT NIGHT 11/30/23   Edwin Bond, DO   Continuous Blood Gluc Sensor (FREESTYLE NAT 14 DAY SENSOR) MISC 1 Units by Does not apply route every 14 days 8/16/23   Edwin Bond, DO   FARXIGA 10 MG tablet TAKE 1 TABLET BY MOUTH IN  THE MORNING 6/28/23   Edwin Bond, DO   mesalamine (PENTASA) 500 MG extended release capsule Take 2 capsules by mouth 2 times daily 6/26/23   Edwin Bond, DO   atorvastatin (LIPITOR) 40 MG tablet TAKE 1 TABLET BY MOUTH AT  NIGHT 6/19/23   Edwin Bond, DO   metFORMIN (GLUCOPHAGE) 500 MG tablet TAKE 1 TABLET BY MOUTH  TWICE DAILY WITH MEALS 4/17/23   Edwin Bond, DO   ranolazine (RANEXA) 500 MG extended release tablet Take 1 tablet by mouth 2 times daily 1/19/23   Siri Dasilva, APRN - CNP   sildenafil (VIAGRA) 50 MG tablet Take 1 tablet by mouth as needed for Erectile Dysfunction 6/23/22   Edwin Bond,    HYDROcodone-acetaminophen (NORCO)  MG per tablet Take 1 tablet by mouth every 6 hours as needed.    Provider, MD David   nitroGLYCERIN (NITROSTAT) 0.4 MG SL tablet Place 1 tablet under the tongue every 5 minutes as needed for Chest pain 7/7/20   Edwin Bond,    glucose (GLUTOSE) 40 % GEL Take 37.5 mLs by mouth as needed (low blood sugar (less than 70)) 8/13/19   Watson Shields MD   inFLIXimab (REMICADE) 100 MG injection Infuse 5 mg/kg intravenously See Admin Instructions q 6-8 wks    Provider, MD David   aspirin 81 MG chewable tablet Take 1 tablet by mouth daily 8/15/17   Milton Pacheco MD       Physical Exam:    Physical Exam:    General: Well-developed/obese, nontoxic in NAD  HEENT: Normocephalic/atraumatic, anicteric sclerae no conjunctiva pallor, moist oral mucosa, oropharynx clear  Neck: Supple, no bruit no thyromegaly or cervical

## 2024-04-29 NOTE — PROGRESS NOTES
Discussed with neurologist about MRI brain finding  Ok to dc as per neurology and have follow  up with eye doctor tomorrow  TSH wnl  
4 Eyes Skin Assessment     NAME:  Adán Augustin  YOB: 1955  MEDICAL RECORD NUMBER:  [id number]    The patient is being assessed for  Admission    I agree that at least one RN has performed a thorough Head to Toe Skin Assessment on the patient. ALL assessment sites listed below have been assessed.      Areas assessed by both nurses:    Head, Face, Ears, Shoulders, Back, Chest, Arms, Elbows, Hands, Sacrum. Buttock, Coccyx, Ischium, and Legs. Feet and Heels        Does the Patient have a Wound? No noted wound(s)       Vicente Prevention initiated by RN: No  Wound Care Orders initiated by RN: No    Pressure Injury (Stage 3,4, Unstageable, DTI, NWPT, and Complex wounds) if present, place Wound referral order by RN under : No    New Ostomies, if present place, Ostomy referral order under : No     Nurse 1 eSignature: Electronically signed by Jenni Clark RN on 4/29/24 at 1:03 AM EDT    **SHARE this note so that the co-signing nurse can place an eSignature**    Nurse 2 eSignature: Electronically signed by Rachel Quezada RN on 4/29/24 at 1:28 AM EDT    
Discharge orders acknowledged by RN . Discharge teaching completed with pt and family. AVS reviewed and all questions answered. Medication regimen reviewed and pt understands schedule. Follow up appointments also reviewed with pt and resources given for discharge. Pt was sent electronic to be filled and understands schedule. IV removed. Bedside monitor removed from pt. education completed. Pt vitals WDL. Pt discharged with all belongings to home with family. Pt transported off of unit. No complications.     Electronically signed by Gina Mendoza RN on 4/29/2024 at 7:20 PM    
Medication Reconciliation    List of medications patient is currently taking is complete.     Source of information: 1. Conversation with patient's family via phone call                                      2. EPIC records      Allergies  Codeine, Acetaminophen, Oxycodone-acetaminophen, Percocet [oxycodone-acetaminophen], and Oxycodone     Notes regarding home medications:   1. Patient received all of his morning home medications prior to arrival to the emergency department.    2. Patient stated his wife takes care of all of his medications, so she was contacted and updated his med list along with latest administrations.(Johana: [phone])    3. Patient is on Lantus taking 68 units nightly, taken last PM. Patient is also on infliximab injections, with last infusion being 3/27/24.    Jocelyne Major, Pharmacy Intern  4/28/2024 8:51 PM            
Occupational Therapy  Facility/Department: [address] PROGRESSIVE CARE  Occupational Therapy Initial Assessment    Name: Adán Augustin  : 1955  MRN: [id number]  Date of Service: 2024    Discharge Recommendations:  Home independently  OT Equipment Recommendations  Equipment Needed: No     Adán Augustin scored a 24/24 on the AM-PAC ADL Inpatient form.  At this time, no further OT is recommended upon discharge. Recommend patient returns to prior setting with prior services.         Patient Diagnosis(es): The encounter diagnosis was Diplopia.  Past Medical History:  has a past medical history of Aneurysm of ascending aorta (HCC), CAD (coronary artery disease), Chronic diastolic CHF (congestive heart failure), NYHA class 2 (HCC), Crohn's disease (HCC), Diabetes mellitus (HCC), Fistula of intestine, History of resection of small bowel, MRSA colonization, Pancreatitis, and Peritonitis (HCC).  Past Surgical History:  has a past surgical history that includes Cholecystectomy; Small intestine surgery; Abscess Drainage (2/11/15); Abscess Drainage (Right, 2017); Cardiac catheterization (2017); and Diagnostic Cardiac Cath Lab Procedure.           Assessment   Assessment: 70 y/o male admitted 2024 with diplopia. PTA Pt lives at home with family and was independent with ADLs and functional mobility. Today, Pt reports when one eye is closed he does not see double vision. Pt reports R eye is clear however L eye appears to have a haze and takes longer to fully focus. Eye patch provided and edu to switch eyes ever 2-3 hours. Pt able to completed transfers and functional mobility independently with one eye closed/patched. Pt denied concerns with ADls. Pt safe to return home. Recommend no driving until vision improves and to follow up outpatient (if necessary) after seen by eye doc.  Prognosis: Good  Decision Making: Low Complexity  No Skilled OT: No OT goals identified  REQUIRES OT FOLLOW-UP: No  Activity 
Pt arrived to room 5133 from ED via stretcher. Pt ambulated to the BR and bed without assistance, tolerated well. Portable telemetry activated, NSR on monitor. CMU verified. Pt A&O x4, VSS. Assessment in-progress and history obtained. Pt oriented to room and unit. Orders reviewed, medications verified, all questions answered. Pt resting comfortably in bed and voices no complaints at this time. Bed in lowest position with wheels locked. Call light within reach. Care ongoing. Electronically signed by Jenni Clark RN on 4/29/2024 at 2:56 AM      
Date: 4/28/2024  EXAMINATION: CTA OF THE HEAD AND NECK WITH CONTRAST 4/28/2024 2:28 pm: TECHNIQUE: CTA of the head and neck was performed with the administration of intravenous contrast. Multiplanar reformatted images are provided for review.  MIP images are provided for review. Stenosis of the internal carotid arteries measured using NASCET criteria. Automated exposure control, iterative reconstruction, and/or weight based adjustment of the mA/kV was utilized to reduce the radiation dose to as low as reasonably achievable. COMPARISON: CT head from earlier today HISTORY: ORDERING SYSTEM PROVIDED HISTORY: Stroke Symptoms TECHNOLOGIST PROVIDED HISTORY: Has a \"code stroke\" or \"stroke alert\" been called?->No Reason for exam:->Stroke Symptoms Decision Support Exception - unselect if not a suspected or confirmed emergency medical condition->Emergency Medical Condition (MA) Reason for Exam: double vision x3 days- getting worse FINDINGS: CTA NECK: AORTIC ARCH/ARCH VESSELS: No dissection or arterial injury.  No significant stenosis of the brachiocephalic or subclavian arteries. CAROTID ARTERIES: There is mild stenosis in the proximal right internal carotid artery.  No dissection, arterial injury, or hemodynamically significant stenosis by NASCET criteria. VERTEBRAL ARTERIES: No dissection, arterial injury, or significant stenosis. SOFT TISSUES: The lung apices are clear.  No cervical or superior mediastinal lymphadenopathy.  The larynx and pharynx are unremarkable.  No acute abnormality of the salivary and thyroid glands. BONES: No acute osseous abnormality.  There are moderate degenerative changes in the cervical spine. CTA HEAD: ANTERIOR CIRCULATION: No significant stenosis of the intracranial internal carotid, anterior cerebral, or middle cerebral arteries. No aneurysm. POSTERIOR CIRCULATION: No significant stenosis of the vertebral, basilar, or posterior cerebral arteries. No aneurysm. OTHER: No dural venous sinus

## 2024-04-29 NOTE — ED NOTES
.ED SBAR report provider to RADHA Cedeno. Patient to be transported to Room 5133 via stretcher by transport tech. IV site clean, dry, and intact. MEWS score 1 and pain assessed as 4/10  and documented.

## 2024-04-29 NOTE — PLAN OF CARE
Problem: Discharge Planning  Goal: Discharge to home or other facility with appropriate resources  Outcome: Progressing  Flowsheets (Taken 4/28/2024 2224)  Discharge to home or other facility with appropriate resources:   Identify barriers to discharge with patient and caregiver   Arrange for needed discharge resources and transportation as appropriate   Identify discharge learning needs (meds, wound care, etc)   Refer to discharge planning if patient needs post-hospital services based on physician order or complex needs related to functional status, cognitive ability or social support system     Problem: Pain  Goal: Verbalizes/displays adequate comfort level or baseline comfort level  Outcome: Progressing  Flowsheets (Taken 4/28/2024 2228)  Verbalizes/displays adequate comfort level or baseline comfort level:   Encourage patient to monitor pain and request assistance   Assess pain using appropriate pain scale   Administer analgesics based on type and severity of pain and evaluate response   Implement non-pharmacological measures as appropriate and evaluate response   Consider cultural and social influences on pain and pain management   Notify Licensed Independent Practitioner if interventions unsuccessful or patient reports new pain     Problem: Safety - Adult  Goal: Free from fall injury  Outcome: Progressing  Flowsheets (Taken 4/29/2024 0537)  Free From Fall Injury: Instruct family/caregiver on patient safety     Problem: Metabolic/Fluid and Electrolytes - Adult  Goal: Glucose maintained within prescribed range  Outcome: Progressing  Flowsheets (Taken 4/29/2024 0537)  Glucose maintained within prescribed range:   Monitor blood glucose as ordered   Assess for signs and symptoms of hyperglycemia and hypoglycemia   Administer ordered medications to maintain glucose within target range   Assess barriers to adequate nutritional intake and initiate nutrition consult as needed   Instruct patient on self management of

## 2024-04-29 NOTE — PLAN OF CARE
Problem: Discharge Planning  Goal: Discharge to home or other facility with appropriate resources  4/29/2024 1834 by Gina Mendoza RN  Outcome: Adequate for Discharge  4/29/2024 0537 by Jenni Clark RN  Outcome: Progressing  Flowsheets (Taken 4/28/2024 2224)  Discharge to home or other facility with appropriate resources:   Identify barriers to discharge with patient and caregiver   Arrange for needed discharge resources and transportation as appropriate   Identify discharge learning needs (meds, wound care, etc)   Refer to discharge planning if patient needs post-hospital services based on physician order or complex needs related to functional status, cognitive ability or social support system     Problem: Pain  Goal: Verbalizes/displays adequate comfort level or baseline comfort level  4/29/2024 1834 by Gina Mendoza RN  Outcome: Adequate for Discharge  4/29/2024 0537 by Jenni Clark RN  Outcome: Progressing  Flowsheets (Taken 4/28/2024 2228)  Verbalizes/displays adequate comfort level or baseline comfort level:   Encourage patient to monitor pain and request assistance   Assess pain using appropriate pain scale   Administer analgesics based on type and severity of pain and evaluate response   Implement non-pharmacological measures as appropriate and evaluate response   Consider cultural and social influences on pain and pain management   Notify Licensed Independent Practitioner if interventions unsuccessful or patient reports new pain     Problem: Safety - Adult  Goal: Free from fall injury  4/29/2024 1834 by Gina Mendoza RN  Outcome: Adequate for Discharge  4/29/2024 0537 by Jenni Clark RN  Outcome: Progressing  Flowsheets (Taken 4/29/2024 0537)  Free From Fall Injury: Instruct family/caregiver on patient safety     Problem: Metabolic/Fluid and Electrolytes - Adult  Goal: Glucose maintained within prescribed range  4/29/2024 1834 by Gina Mendoza RN  Outcome: Adequate for Discharge  4/29/2024

## 2024-04-29 NOTE — CONSULTS
NEUROLOGY CONSULT NOTE  Reason for Consult: Anayeli Bland MD asked me to see Adán Augustin in consultation for evaluation of:  diplopia    Chief complaint: \"I see two of you.\"    HISTORY OF PRESENT ILLNESS:  HPI was gathered from the patient at the bedside as well as EMR review.    Adán Augustin is a 69 y.o. male with a PMH that includes AAA, CAD s/p SHAN x2, CHF, Crohn's, DM, peritonitis, and pancreatitis.    Patient presented to the ED yesterday reporting pain behind his left eye and double vision that had started on Thursday (now 4 days ago) and got worse yesterday.  He describes the pain as an annoying pressure or the feeling there there is a \"bar\" pressing behind his left eye. The double vision resolves with either eye closed.  The diplopia is horizontal.  He has no temporal artery tenderness.  No headache.  No other focal neurological symptoms. He denies any difficulty swallowing or breathing.   He has never had anything like this before.      BP in the /85.  .  Afebrile.  NIHSS 0. HCT showed partial opacification of the ethmoid air cells and frontal sinus.  CTAs were non-acute. MRI brain w/w/o remains pending.    The patient's wife was at the bedside at the time of evaluation.    MEDICAL HISTORY:  Past Medical History:   Diagnosis Date    Aneurysm of ascending aorta (HCC)     CAD (coronary artery disease) 08/2017    NSTEMI    Chronic diastolic CHF (congestive heart failure), NYHA class 2 (HCC)     Crohn's disease (HCC)     Diabetes mellitus (HCC)     Fistula of intestine     History of resection of small bowel     MRSA colonization 10/08/2020    Pancreatitis     Peritonitis (HCC)      Past Surgical History:   Procedure Laterality Date    ABSCESS DRAINAGE  2/11/15    I and d of scrotal abscess    ABSCESS DRAINAGE Right 08/04/2017    right inner thigh    CARDIAC CATHETERIZATION  08/2017    SHAN x 2 to RCA; OM and Dg stenosis    CHOLECYSTECTOMY      DIAGNOSTIC CARDIAC CATH LAB

## 2024-04-29 NOTE — CARE COORDINATION
DISCHARGE PLANNING:    Per chart review, patient has no SW/CM needs at this time.  If needs arise, please consult SW/CM.  Patient from home independent.      #[phone]  Electronically signed by Larisa Juárez RN on 4/29/2024 at 3:13 PM

## 2024-04-30 ENCOUNTER — CARE COORDINATION (OUTPATIENT)
Dept: CASE MANAGEMENT | Age: 69
End: 2024-04-30

## 2024-04-30 NOTE — DISCHARGE SUMMARY
Hospital Medicine Discharge Summary    Patient ID: Adán Augustin      Patient's PCP: Edwin Bond DO    Admit Date: 4/28/2024     Discharge Date: 4/29/2024      Admitting Physician: Najma Anthony MD     Discharge Physician: Anayeli Vang MD     Discharge Diagnoses:       Active Hospital Problems    Diagnosis     Monocular diplopia [H53.2]     Coronary artery disease involving native coronary artery of native heart with angina pectoris (HCC) [I25.119]     Uncontrolled type 2 diabetes mellitus with hyperglycemia (HCC) [E11.65]     Mixed hyperlipidemia [E78.2]        The patient was seen and examined on day of discharge and this discharge summary is in conjunction with any daily progress note from day of discharge.    Hospital Course:     Adán Augustin is a 69 y.o. obese male smoker in remission with pmh of diabetes type 2 with peripheral neuropathy, dyslipidemia, CAD, PAD, ischemic cardiomyopath who presents with double vision for the past 24 hours.  Patient reports transit blurred vision upon waking up and resolving after few hours which started on 4/25/2024 upon waking up then started having double vision when using left eye alone or with the right eye associated with some mild discomfort behind the left eye.  He denies any floaters, blind spots focal tingling weakness or numbness, speech impediment, vertiginous sensation or any other form of dizziness.  He also denies any chest pain, headaches, shortness of breath, nausea or vomiting.     In the emergency room his temperature was 36.6, blood pressure 145/86, pulse 85, respirations 16, sats 95% on room air, BMI 33.68.      Neurology recommendmpression/Recommendations  Patient has diplopia, left eye ptosis, and left eye incomplete adduction.  Awaiting MRI brain read, but I did not see an acute stroke. 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  He has uncontrolled diabetes with hga1c 9.7.  Recommend control of diabetes (hga1c

## 2024-04-30 NOTE — CARE COORDINATION
Care Transitions Note    Initial Call - Call within 2 business days of discharge: Yes     Attempted to reach patient for transitions of care follow up. Unable to reach patient.    Outreach Attempts:   Unable to leave message.     Patient: Adán Augustin    Patient : 1955   MRN: [id number]    Reason for Admission: diplopia  Discharge Date: 24  RURS: Readmission Risk              Risk of Unplanned Readmission:  0          Last Discharge Facility       Date Complaint Diagnosis Description Type Department Provider    24 Eye Problem Diplopia ED to Hosp-Admission (Discharged) (ADMITTED) [facility] Anayeli Vang MD; Ke...            Was this an external facility discharge? No    Follow Up Appointment:   Patient does not have a follow up appointment scheduled at time of call.  Will attempt to reach pt tomorrow.   Future Appointments         Provider Specialty Dept Phone    2024 11:30 AM Edwin Bond, DO Family Medicine [phone]            Plan for follow-up on next business day.      DEEPA Melo RN

## 2024-05-01 ENCOUNTER — CARE COORDINATION (OUTPATIENT)
Dept: CASE MANAGEMENT | Age: 69
End: 2024-05-01

## 2024-05-01 LAB
ACHR BIND AB SER-SCNC: 0 NMOL/L (ref 0–0.4)
ACHR BLOCK AB/ACHR TOTAL SFR SER: 6 % (ref 0–26)

## 2024-05-01 NOTE — CARE COORDINATION
Care Transitions Note    Initial Call - Call within 2 business days of discharge: Yes     Attempted to reach patient for transitions of care follow up. Unable to reach patient.    Outreach Attempts:   Multiple attempts to contact patient at phone numbers on file.   Mychart message sent.   Unable to leave message.     Patient: Adán Augustin    Patient : 1955   MRN: [id number]    Reason for Admission: Diplopia  Discharge Date: 24  RURS: Readmission Risk              Risk of Unplanned Readmission:  0          Last Discharge Facility       Date Complaint Diagnosis Description Type Department Provider    24 Eye Problem Diplopia ED to Hosp-Admission (Discharged) (ADMITTED) [facility] Anayeli Vang MD; Ke...            Was this an external facility discharge? No    Follow Up Appointment:   Patient does not have a follow up appointment scheduled at time of call.  Pt to see PCP 5/3 for \"6 month Follow up\" will route a message to office to see if this could be changed to a HFU.   Future Appointments         Provider Specialty Dept Phone    5/3/2024 1:45 PM Edwin Bond, DO Family Medicine [phone]            No further follow-up call indicated     DEEPA Melo, RADHA

## 2024-05-02 LAB — ACHR MOD AB/ACHR TOTAL SFR SER: 0 %

## 2024-05-03 ENCOUNTER — OFFICE VISIT (OUTPATIENT)
Dept: FAMILY MEDICINE CLINIC | Age: 69
End: 2024-05-03
Payer: MEDICARE

## 2024-05-03 VITALS
WEIGHT: 260 LBS | DIASTOLIC BLOOD PRESSURE: 78 MMHG | BODY MASS INDEX: 33.37 KG/M2 | SYSTOLIC BLOOD PRESSURE: 126 MMHG | HEIGHT: 74 IN

## 2024-05-03 DIAGNOSIS — E11.65 UNCONTROLLED TYPE 2 DIABETES MELLITUS WITH HYPERGLYCEMIA (HCC): ICD-10-CM

## 2024-05-03 DIAGNOSIS — K50.90 CROHN'S DISEASE WITHOUT COMPLICATION, UNSPECIFIED GASTROINTESTINAL TRACT LOCATION (HCC): ICD-10-CM

## 2024-05-03 DIAGNOSIS — I21.4 NSTEMI (NON-ST ELEVATED MYOCARDIAL INFARCTION) (HCC): Primary | ICD-10-CM

## 2024-05-03 DIAGNOSIS — I71.21 ANEURYSM OF ASCENDING AORTA WITHOUT RUPTURE (HCC): ICD-10-CM

## 2024-05-03 PROCEDURE — 3046F HEMOGLOBIN A1C LEVEL >9.0%: CPT | Performed by: FAMILY MEDICINE

## 2024-05-03 PROCEDURE — 2022F DILAT RTA XM EVC RTNOPTHY: CPT | Performed by: FAMILY MEDICINE

## 2024-05-03 PROCEDURE — 3017F COLORECTAL CA SCREEN DOC REV: CPT | Performed by: FAMILY MEDICINE

## 2024-05-03 PROCEDURE — G8427 DOCREV CUR MEDS BY ELIG CLIN: HCPCS | Performed by: FAMILY MEDICINE

## 2024-05-03 PROCEDURE — G8417 CALC BMI ABV UP PARAM F/U: HCPCS | Performed by: FAMILY MEDICINE

## 2024-05-03 PROCEDURE — 1123F ACP DISCUSS/DSCN MKR DOCD: CPT | Performed by: FAMILY MEDICINE

## 2024-05-03 PROCEDURE — G2211 COMPLEX E/M VISIT ADD ON: HCPCS | Performed by: FAMILY MEDICINE

## 2024-05-03 PROCEDURE — 1036F TOBACCO NON-USER: CPT | Performed by: FAMILY MEDICINE

## 2024-05-03 PROCEDURE — 99214 OFFICE O/P EST MOD 30 MIN: CPT | Performed by: FAMILY MEDICINE

## 2024-05-03 RX ORDER — KETOROLAC TROMETHAMINE 30 MG/ML
1 INJECTION, SOLUTION INTRAMUSCULAR; INTRAVENOUS
Qty: 6 EACH | Refills: 5 | Status: SHIPPED | OUTPATIENT
Start: 2024-05-03

## 2024-05-03 RX ORDER — BLOOD-GLUCOSE SENSOR
1 EACH MISCELLANEOUS
Qty: 6 EACH | Refills: 5 | Status: SHIPPED | OUTPATIENT
Start: 2024-05-03 | End: 2024-05-17

## 2024-05-03 SDOH — ECONOMIC STABILITY: HOUSING INSECURITY
IN THE LAST 12 MONTHS, WAS THERE A TIME WHEN YOU DID NOT HAVE A STEADY PLACE TO SLEEP OR SLEPT IN A SHELTER (INCLUDING NOW)?: NO

## 2024-05-03 SDOH — ECONOMIC STABILITY: INCOME INSECURITY: HOW HARD IS IT FOR YOU TO PAY FOR THE VERY BASICS LIKE FOOD, HOUSING, MEDICAL CARE, AND HEATING?: NOT HARD AT ALL

## 2024-05-03 SDOH — ECONOMIC STABILITY: FOOD INSECURITY: WITHIN THE PAST 12 MONTHS, THE FOOD YOU BOUGHT JUST DIDN'T LAST AND YOU DIDN'T HAVE MONEY TO GET MORE.: NEVER TRUE

## 2024-05-03 SDOH — ECONOMIC STABILITY: FOOD INSECURITY: WITHIN THE PAST 12 MONTHS, YOU WORRIED THAT YOUR FOOD WOULD RUN OUT BEFORE YOU GOT MONEY TO BUY MORE.: NEVER TRUE

## 2024-05-03 ASSESSMENT — PATIENT HEALTH QUESTIONNAIRE - PHQ9
SUM OF ALL RESPONSES TO PHQ QUESTIONS 1-9: 0
SUM OF ALL RESPONSES TO PHQ QUESTIONS 1-9: 0
SUM OF ALL RESPONSES TO PHQ9 QUESTIONS 1 & 2: 0
SUM OF ALL RESPONSES TO PHQ QUESTIONS 1-9: 0
2. FEELING DOWN, DEPRESSED OR HOPELESS: NOT AT ALL
1. LITTLE INTEREST OR PLEASURE IN DOING THINGS: NOT AT ALL
SUM OF ALL RESPONSES TO PHQ QUESTIONS 1-9: 0

## 2024-05-03 NOTE — PROGRESS NOTES
Rhythm: Normal rate and regular rhythm.      Heart sounds: No murmur heard.  Pulmonary:      Effort: Pulmonary effort is normal.      Breath sounds: Normal breath sounds. No wheezing or rales.   Abdominal:      General: Bowel sounds are normal.      Palpations: Abdomen is soft.      Tenderness: There is no abdominal tenderness.   Musculoskeletal:      Cervical back: Neck supple.   Lymphadenopathy:      Cervical: No cervical adenopathy.   Skin:     Findings: No rash.   Neurological:      Mental Status: He is alert and oriented to person, place, and time.   Psychiatric:         Behavior: Behavior normal.         ASSESSMENT/PLAN:  1. NSTEMI (non-ST elevated myocardial infarction) (HCC)  Stable  Continue with medication  Keep appointments with specialist.   Answered questions     2. Crohn's disease without complication, unspecified gastrointestinal tract location (HCC)  Stable  Continue with medication  Keep appointments with specialist.   Answered questions     3. Aneurysm of ascending aorta without rupture (HCC)  Stable  Continue with medication  Keep appointments with specialist.   Answered questions     4. Uncontrolled type 2 diabetes mellitus with hyperglycemia (HCC)  Patient will need to keep a log of his glucose readings and bring them to the office.   He needs to monitor his diet and exercise.   Attempt to lose weight.   Discussed proper eating habits.   Continue to take medication as prescribed.   Will obtain A1C at this visit.   Educated on signs and symptoms for immediate evaluation in the ER.        No follow-ups on file.

## 2024-05-07 ENCOUNTER — TELEPHONE (OUTPATIENT)
Dept: FAMILY MEDICINE CLINIC | Age: 69
End: 2024-05-07

## 2024-05-07 ENCOUNTER — TELEMEDICINE (OUTPATIENT)
Dept: FAMILY MEDICINE CLINIC | Age: 69
End: 2024-05-07
Payer: MEDICARE

## 2024-05-07 DIAGNOSIS — Z00.00 MEDICARE ANNUAL WELLNESS VISIT, SUBSEQUENT: Primary | ICD-10-CM

## 2024-05-07 PROCEDURE — G0439 PPPS, SUBSEQ VISIT: HCPCS | Performed by: FAMILY MEDICINE

## 2024-05-07 PROCEDURE — 3017F COLORECTAL CA SCREEN DOC REV: CPT | Performed by: FAMILY MEDICINE

## 2024-05-07 PROCEDURE — 1123F ACP DISCUSS/DSCN MKR DOCD: CPT | Performed by: FAMILY MEDICINE

## 2024-05-07 ASSESSMENT — PATIENT HEALTH QUESTIONNAIRE - PHQ9
SUM OF ALL RESPONSES TO PHQ QUESTIONS 1-9: 0
SUM OF ALL RESPONSES TO PHQ9 QUESTIONS 1 & 2: 0
SUM OF ALL RESPONSES TO PHQ QUESTIONS 1-9: 0
SUM OF ALL RESPONSES TO PHQ QUESTIONS 1-9: 0
2. FEELING DOWN, DEPRESSED OR HOPELESS: NOT AT ALL
1. LITTLE INTEREST OR PLEASURE IN DOING THINGS: NOT AT ALL
SUM OF ALL RESPONSES TO PHQ QUESTIONS 1-9: 0

## 2024-05-07 ASSESSMENT — LIFESTYLE VARIABLES
HOW OFTEN DO YOU HAVE A DRINK CONTAINING ALCOHOL: NEVER
HOW MANY STANDARD DRINKS CONTAINING ALCOHOL DO YOU HAVE ON A TYPICAL DAY: PATIENT DOES NOT DRINK

## 2024-05-07 NOTE — PATIENT INSTRUCTIONS
Learning About Being Active as an Older Adult  Why is being active important as you get older?     Being active is one of the best things you can do for your health. And it's never too late to start. Being active--or getting active, if you aren't already--has definite benefits. It can:  Give you more energy,  Keep your mind sharp.  Improve balance to reduce your risk of falls.  Help you manage chronic illness with fewer medicines.  No matter how old you are, how fit you are, or what health problems you have, there is a form of activity that will work for you. And the more physical activity you can do, the better your overall health will be.  What kinds of activity can help you stay healthy?  Being more active will make your daily activities easier. Physical activity includes planned exercise and things you do in daily life. There are four types of activity:  Aerobic.  Doing aerobic activity makes your heart and lungs strong.  Includes walking, dancing, and gardening.  Aim for at least 2½ hours spread throughout the week.  It improves your energy and can help you sleep better.  Muscle-strengthening.  This type of activity can help maintain muscle and strengthen bones.  Includes climbing stairs, using resistance bands, and lifting or carrying heavy loads.  Aim for at least twice a week.  It can help protect the knees and other joints.  Stretching.  Stretching gives you better range of motion in joints and muscles.  Includes upper arm stretches, calf stretches, and gentle yoga.  Aim for at least twice a week, preferably after your muscles are warmed up from other activities.  It can help you function better in daily life.  Balancing.  This helps you stay coordinated and have good posture.  Includes heel-to-toe walking, estevan chi, and certain types of yoga.  Aim for at least 3 days a week.  It can reduce your risk of falling.  Even if you have a hard time meeting the recommendations, it's better to be more active

## 2024-05-07 NOTE — PROGRESS NOTES
Medicare Annual Wellness Visit    Adán Augustin is here for Medicare AW    Assessment & Plan     Recommendations for Preventive Services Due: see orders and patient instructions/AVS.  Recommended screening schedule for the next 5-10 years is provided to the patient in written form: see Patient Instructions/AVS.     No follow-ups on file.     Subjective   The following acute and/or chronic problems were also addressed today:  Dm II    Patient's complete Health Risk Assessment and screening values have been reviewed and are found in Flowsheets. The following problems were reviewed today and where indicated follow up appointments were made and/or referrals ordered.    Positive Risk Factor Screenings with Interventions:            Controlled Medication Review:      Today's Pain Level: No data recorded   Opioid Risk: (Low risk score <55) Opioid risk score: 6    Patient is low risk for opioid use disorder or overdose.    Last PDMP [place] as Reviewed:  Review User Review Instant Review Result                 Activity, Diet, and Weight:  On average, how many days per week do you engage in moderate to strenuous exercise (like a brisk walk)?: 0 days  On average, how many minutes do you engage in exercise at this level?: 0 min    Do you eat balanced/healthy meals regularly?: Yes    There is no height or weight on file to calculate BMI. (!) Abnormal      Inactivity Interventions:  Patient declined any further interventions or treatment  Obesity Interventions:  Patient declines any further evaluation or treatment            Dentist Screen:  Have you seen the dentist within the past year?: (!) No   Vision Screen:  Do you have difficulty driving, watching TV, or doing any of your daily activities because of your eyesight?: (!) Yes  Have you had an eye exam within the past year?: Yes  No results found.  Safety:  Do you have non-slip mats or non-slip surfaces or shower bars or grab bars in your shower or bathtub?: (!) No

## 2024-05-24 DIAGNOSIS — I25.10 CORONARY ARTERY DISEASE INVOLVING NATIVE HEART WITHOUT ANGINA PECTORIS, UNSPECIFIED VESSEL OR LESION TYPE: ICD-10-CM

## 2024-05-24 DIAGNOSIS — E78.5 HYPERLIPIDEMIA, UNSPECIFIED HYPERLIPIDEMIA TYPE: ICD-10-CM

## 2024-05-24 RX ORDER — ATORVASTATIN CALCIUM 40 MG/1
TABLET, FILM COATED ORAL
Qty: 90 TABLET | Refills: 3 | Status: SHIPPED | OUTPATIENT
Start: 2024-05-24

## 2024-05-24 NOTE — TELEPHONE ENCOUNTER
Last office visit 5/7/2024       Next office visit scheduled Visit date not found    Requested Prescriptions     Pending Prescriptions Disp Refills    metFORMIN (GLUCOPHAGE) 500 MG tablet [Pharmacy Med Name: metFORMIN HCl 500 MG Oral Tablet] 180 tablet 3     Sig: TAKE 1 TABLET BY MOUTH TWICE  DAILY WITH MEALS    atorvastatin (LIPITOR) 40 MG tablet [Pharmacy Med Name: Atorvastatin Calcium 40 MG Oral Tablet] 90 tablet 3     Sig: TAKE 1 TABLET BY MOUTH AT NIGHT

## 2024-07-06 DIAGNOSIS — I21.4 NSTEMI (NON-ST ELEVATED MYOCARDIAL INFARCTION) (HCC): ICD-10-CM

## 2024-07-06 DIAGNOSIS — E11.65 UNCONTROLLED TYPE 2 DIABETES MELLITUS WITH HYPERGLYCEMIA (HCC): ICD-10-CM

## 2024-07-06 DIAGNOSIS — I25.119 CORONARY ARTERY DISEASE INVOLVING NATIVE CORONARY ARTERY OF NATIVE HEART WITH ANGINA PECTORIS (HCC): ICD-10-CM

## 2024-07-06 DIAGNOSIS — I71.21 ASCENDING AORTIC ANEURYSM (HCC): ICD-10-CM

## 2024-07-06 DIAGNOSIS — K50.90 CROHN'S DISEASE WITHOUT COMPLICATION, UNSPECIFIED GASTROINTESTINAL TRACT LOCATION (HCC): ICD-10-CM

## 2024-07-08 RX ORDER — DAPAGLIFLOZIN 10 MG/1
TABLET, FILM COATED ORAL
Qty: 90 TABLET | Refills: 3 | Status: SHIPPED | OUTPATIENT
Start: 2024-07-08

## 2024-07-08 NOTE — TELEPHONE ENCOUNTER
Last office visit 5/7/2024       Next office visit scheduled 8/2/2024    Requested Prescriptions     Pending Prescriptions Disp Refills    FARXIGA 10 MG tablet [Pharmacy Med Name: Farxiga 10 MG Oral Tablet] 90 tablet 3     Sig: TAKE 1 TABLET BY MOUTH IN THE  MORNING

## 2024-08-15 ENCOUNTER — OFFICE VISIT (OUTPATIENT)
Dept: FAMILY MEDICINE CLINIC | Age: 69
End: 2024-08-15

## 2024-08-15 VITALS — SYSTOLIC BLOOD PRESSURE: 116 MMHG | WEIGHT: 244.4 LBS | BODY MASS INDEX: 31.38 KG/M2 | DIASTOLIC BLOOD PRESSURE: 70 MMHG

## 2024-08-15 DIAGNOSIS — I25.119 CORONARY ARTERY DISEASE INVOLVING NATIVE CORONARY ARTERY OF NATIVE HEART WITH ANGINA PECTORIS (HCC): ICD-10-CM

## 2024-08-15 DIAGNOSIS — E11.65 UNCONTROLLED TYPE 2 DIABETES MELLITUS WITH HYPERGLYCEMIA (HCC): Primary | ICD-10-CM

## 2024-08-15 DIAGNOSIS — E78.2 MIXED HYPERLIPIDEMIA: ICD-10-CM

## 2024-08-15 LAB — HBA1C MFR BLD: 8.7 %

## 2024-08-15 ASSESSMENT — ENCOUNTER SYMPTOMS
COUGH: 0
WHEEZING: 0
RHINORRHEA: 0
SORE THROAT: 0
SHORTNESS OF BREATH: 0
DIARRHEA: 0
EYE DISCHARGE: 0
TROUBLE SWALLOWING: 0
VOMITING: 0
BLOOD IN STOOL: 0
ANAL BLEEDING: 0
SINUS PRESSURE: 0
FACIAL SWELLING: 0
NAUSEA: 0
CHEST TIGHTNESS: 0
ABDOMINAL PAIN: 0

## 2024-08-15 NOTE — PROGRESS NOTES
8/15/2024     Adán Augustin (:  1955) is a 69 y.o. male, here for evaluation of the following medical concerns:    HPI  Patient presented for follow up concerning several concerns.       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   Improving and seeing much better.      DM II-  A1C is  poorly controlled today Takes lantus 66  at night will increase this to 65, patient has A1C that was .8.7.   He hasn't been monitoring his glucose at home, not sure what else he can do? Seems frustrated, also taking glipizide 5 mg daily,  Metformin 500 BID, unable to tolerate higher dose due to Crohns,  needs eye exam and foot exam today.      CAD-  NSTEMI- 2017/Ascending aortic aneurysm, follows with Dr. Wick, last visit was in , Hospitalized from 2017-8/15/2017 with NSTEMI.   He underwent cardiac cath which resulted in SHAN x2 to RCA. LVEF 35% initially and follow up echo showing LVEF 55-60% (done after PCI).  aneurysm- 4 cm according to note from cardiology has been stable since 2017.     Crohn's- still following with GI, needs colonoscopy and will defer to his GI doctor.  Stated they have spoken concerning this and have a plan to revisit the test this year.  Will follow up with GI for colonoscopy.     Hyperlipidemia- taking medication as prescribed, feels this is well controlled, last LDL is 27, HDL is 48, no side effects from the medication.      Today, denied chest pain, sob, n, v, or diarrhea.  Review of Systems   Constitutional:  Positive for fatigue. Negative for activity change, fever and unexpected weight change.   HENT:  Negative for congestion, ear pain, facial swelling, hearing loss, rhinorrhea, sinus pressure, sore throat and trouble swallowing.    Eyes:  Negative for discharge and visual disturbance.   Respiratory:  Negative for cough, chest tightness, shortness of breath and wheezing.    Cardiovascular:  Negative for chest

## 2024-08-28 RX ORDER — MESALAMINE 500 MG/1
CAPSULE ORAL
Qty: 360 CAPSULE | Refills: 0 | Status: SHIPPED | OUTPATIENT
Start: 2024-08-28

## 2024-08-28 NOTE — TELEPHONE ENCOUNTER
Last office visit 8/15/2024       Next office visit scheduled 1/14/2025    Requested Prescriptions     Pending Prescriptions Disp Refills    PENTASA 500 MG extended release capsule [Pharmacy Med Name: PENTASA  500MG  CAP  CR] 360 capsule 0     Sig: TAKE 2 CAPSULES BY MOUTH TWICE  DAILY (EQUIVALENT TO MESALAMINE  ER CAP 500MG)

## 2024-09-25 ENCOUNTER — TELEMEDICINE (OUTPATIENT)
Dept: FAMILY MEDICINE CLINIC | Age: 69
End: 2024-09-25

## 2024-09-25 DIAGNOSIS — J40 BRONCHITIS: Primary | ICD-10-CM

## 2024-09-25 RX ORDER — DOXYCYCLINE HYCLATE 100 MG
100 TABLET ORAL 2 TIMES DAILY
Qty: 20 TABLET | Refills: 0 | Status: SHIPPED | OUTPATIENT
Start: 2024-09-25 | End: 2024-10-05

## 2024-09-28 ASSESSMENT — ENCOUNTER SYMPTOMS
NAUSEA: 0
COUGH: 0
WHEEZING: 0
SHORTNESS OF BREATH: 0
DIARRHEA: 0
ABDOMINAL PAIN: 0
CHEST TIGHTNESS: 0
VOMITING: 0

## 2024-11-04 DIAGNOSIS — I25.5 ISCHEMIC CARDIOMYOPATHY: ICD-10-CM

## 2024-11-04 DIAGNOSIS — E78.5 HYPERLIPIDEMIA, UNSPECIFIED HYPERLIPIDEMIA TYPE: ICD-10-CM

## 2024-11-04 DIAGNOSIS — I71.21 ASCENDING AORTIC ANEURYSM (HCC): ICD-10-CM

## 2024-11-04 DIAGNOSIS — I25.119 CORONARY ARTERY DISEASE INVOLVING NATIVE CORONARY ARTERY OF NATIVE HEART WITH ANGINA PECTORIS (HCC): ICD-10-CM

## 2024-11-04 DIAGNOSIS — I21.4 NSTEMI (NON-ST ELEVATED MYOCARDIAL INFARCTION) (HCC): ICD-10-CM

## 2024-11-04 DIAGNOSIS — K50.90 CROHN'S DISEASE WITHOUT COMPLICATION, UNSPECIFIED GASTROINTESTINAL TRACT LOCATION (HCC): ICD-10-CM

## 2024-11-04 DIAGNOSIS — E11.65 UNCONTROLLED TYPE 2 DIABETES MELLITUS WITH HYPERGLYCEMIA (HCC): ICD-10-CM

## 2024-11-04 DIAGNOSIS — I25.10 CORONARY ARTERY DISEASE INVOLVING NATIVE HEART WITHOUT ANGINA PECTORIS, UNSPECIFIED VESSEL OR LESION TYPE: ICD-10-CM

## 2024-11-04 RX ORDER — ATORVASTATIN CALCIUM 40 MG/1
40 TABLET, FILM COATED ORAL NIGHTLY
Qty: 90 TABLET | Refills: 3 | Status: SHIPPED | OUTPATIENT
Start: 2024-11-04 | End: 2024-11-07

## 2024-11-04 RX ORDER — ASPIRIN 325 MG
325 TABLET ORAL DAILY
Qty: 30 TABLET | Refills: 3 | Status: SHIPPED | OUTPATIENT
Start: 2024-11-04

## 2024-11-04 RX ORDER — GLIPIZIDE 5 MG/1
5 TABLET, FILM COATED, EXTENDED RELEASE ORAL DAILY
Qty: 90 TABLET | Refills: 3 | Status: SHIPPED | OUTPATIENT
Start: 2024-11-04 | End: 2024-11-07

## 2024-11-04 RX ORDER — RANOLAZINE 500 MG/1
500 TABLET, EXTENDED RELEASE ORAL 2 TIMES DAILY
Qty: 90 TABLET | Refills: 7 | Status: SHIPPED | OUTPATIENT
Start: 2024-11-04

## 2024-11-04 RX ORDER — MESALAMINE 500 MG/1
1000 CAPSULE, EXTENDED RELEASE ORAL 2 TIMES DAILY
Qty: 360 CAPSULE | Refills: 0 | Status: SHIPPED | OUTPATIENT
Start: 2024-11-04 | End: 2024-11-07

## 2024-11-04 RX ORDER — METOPROLOL SUCCINATE 25 MG/1
12.5 TABLET, EXTENDED RELEASE ORAL DAILY
Qty: 45 TABLET | Refills: 3 | Status: SHIPPED | OUTPATIENT
Start: 2024-11-04 | End: 2024-11-07

## 2024-11-04 RX ORDER — FUROSEMIDE 20 MG/1
TABLET ORAL
Qty: 90 TABLET | Refills: 3 | Status: SHIPPED | OUTPATIENT
Start: 2024-11-04 | End: 2024-11-07

## 2024-11-04 RX ORDER — DAPAGLIFLOZIN 10 MG/1
10 TABLET, FILM COATED ORAL EVERY MORNING
Qty: 90 TABLET | Refills: 3 | Status: SHIPPED | OUTPATIENT
Start: 2024-11-04 | End: 2024-11-07

## 2024-11-04 RX ORDER — INSULIN GLARGINE 100 [IU]/ML
INJECTION, SOLUTION SUBCUTANEOUS
Qty: 60 ML | Refills: 3 | Status: SHIPPED | OUTPATIENT
Start: 2024-11-04 | End: 2024-11-07

## 2024-11-04 RX ORDER — PEN NEEDLE, DIABETIC 32GX 5/32"
1 NEEDLE, DISPOSABLE MISCELLANEOUS DAILY
Qty: 90 EACH | Refills: 0 | Status: SHIPPED | OUTPATIENT
Start: 2024-11-04 | End: 2024-11-07

## 2024-11-04 NOTE — TELEPHONE ENCOUNTER
Pt calling asking if Dr. Bond can send prescriptions to [facility]. RX pended.     Last office visit 9/25/2024       Next office visit scheduled 1/14/2025    Requested Prescriptions     Pending Prescriptions Disp Refills    aspirin (KAYKAY ASPIRIN) 325 MG tablet 30 tablet 3     Sig: Take 1 tablet by mouth daily    atorvastatin (LIPITOR) 40 MG tablet 90 tablet 3     Sig: Take 1 tablet by mouth nightly    furosemide (LASIX) 20 MG tablet 90 tablet 3     Sig: TAKE 1 TABLET BY MOUTH  DAILY    glipiZIDE (GLUCOTROL XL) 5 MG extended release tablet 90 tablet 3     Sig: Take 1 tablet by mouth daily    metFORMIN (GLUCOPHAGE) 500 MG tablet 180 tablet 3     Sig: Take 1 tablet by mouth 2 times daily (with meals)    metoprolol succinate (TOPROL XL) 25 MG extended release tablet 45 tablet 3     Sig: Take 0.5 tablets by mouth daily    mesalamine (PENTASA) 500 MG extended release capsule 360 capsule 0     Sig: Take 2 capsules by mouth in the morning and at bedtime    dapagliflozin (FARXIGA) 10 MG tablet 90 tablet 3     Sig: Take 1 tablet by mouth every morning    ranolazine (RANEXA) 500 MG extended release tablet 90 tablet 7     Sig: Take 1 tablet by mouth 2 times daily    Insulin Pen Needle (BD PEN NEEDLE ALTHEA 2ND GEN) 32G X 4 MM MISC 90 each 0     Sig: Inject 1 each into the skin daily    insulin glargine (LANTUS SOLOSTAR) 100 UNIT/ML injection pen 60 mL 3     Sig: INJECT SUBCUTANEOUSLY 58  UNITS AT NIGHT

## 2024-11-07 RX ORDER — ATORVASTATIN CALCIUM 40 MG/1
40 TABLET, FILM COATED ORAL NIGHTLY
Qty: 90 TABLET | Refills: 3 | Status: SHIPPED | OUTPATIENT
Start: 2024-11-07 | End: 2024-11-08 | Stop reason: SDUPTHER

## 2024-11-07 RX ORDER — MESALAMINE 500 MG/1
1000 CAPSULE, EXTENDED RELEASE ORAL 2 TIMES DAILY
Qty: 360 CAPSULE | Refills: 3 | Status: SHIPPED | OUTPATIENT
Start: 2024-11-07

## 2024-11-07 RX ORDER — DAPAGLIFLOZIN 10 MG/1
10 TABLET, FILM COATED ORAL EVERY MORNING
Qty: 90 TABLET | Refills: 3 | Status: SHIPPED | OUTPATIENT
Start: 2024-11-07

## 2024-11-07 RX ORDER — FUROSEMIDE 20 MG/1
TABLET ORAL
Qty: 90 TABLET | Refills: 3 | Status: SHIPPED | OUTPATIENT
Start: 2024-11-07

## 2024-11-07 RX ORDER — INSULIN GLARGINE 100 [IU]/ML
INJECTION, SOLUTION SUBCUTANEOUS
Qty: 60 ML | Refills: 3 | Status: SHIPPED | OUTPATIENT
Start: 2024-11-07

## 2024-11-07 RX ORDER — METOPROLOL SUCCINATE 25 MG/1
12.5 TABLET, EXTENDED RELEASE ORAL DAILY
Qty: 45 TABLET | Refills: 3 | Status: SHIPPED | OUTPATIENT
Start: 2024-11-07

## 2024-11-07 RX ORDER — GLIPIZIDE 5 MG/1
5 TABLET, FILM COATED, EXTENDED RELEASE ORAL DAILY
Qty: 90 TABLET | Refills: 3 | Status: SHIPPED | OUTPATIENT
Start: 2024-11-07

## 2024-11-07 RX ORDER — PEN NEEDLE, DIABETIC 32GX 5/32"
1 NEEDLE, DISPOSABLE MISCELLANEOUS DAILY
Qty: 90 EACH | Refills: 0 | Status: SHIPPED | OUTPATIENT
Start: 2024-11-07

## 2024-11-07 NOTE — TELEPHONE ENCOUNTER
Only 2 of the 11 prescriptions actually went through, I re-pended the 9 that did not interface, can you please resend. Thank you!

## 2024-11-08 DIAGNOSIS — I25.10 CORONARY ARTERY DISEASE INVOLVING NATIVE HEART WITHOUT ANGINA PECTORIS, UNSPECIFIED VESSEL OR LESION TYPE: ICD-10-CM

## 2024-11-08 DIAGNOSIS — E78.5 HYPERLIPIDEMIA, UNSPECIFIED HYPERLIPIDEMIA TYPE: ICD-10-CM

## 2024-11-08 RX ORDER — ATORVASTATIN CALCIUM 40 MG/1
40 TABLET, FILM COATED ORAL NIGHTLY
Qty: 90 TABLET | Refills: 3 | Status: SHIPPED | OUTPATIENT
Start: 2024-11-08

## 2024-11-08 NOTE — TELEPHONE ENCOUNTER
Edwin Bond DO, pharmacy did not receive Rxs from yesterday (believe the notes to pharmacy text is preventing eRx from being sent?). Re-pended atorvastatin and removed text, hopefully should resolve issue; can assist with others if that resolves the problem    Last Visit: 24  Next Visit: 25    Thank you,  Nikole Vogel, PharmD, [facility]  Population Health Pharmacist  [facility]  Department, toll free: [phone], option 1  =======================================================================================  POPULATION HEALTH CLINICAL PHARMACY: ADHERENCE REVIEW  Identified care gap per [employer]: fills at [facility]: Statin adherence    ASSESSMENT  STATIN ADHERENCE    Insurance Records claims through 10/07/2024 (Prior Year PDC = not reported; YTD PDC = 82%; Potential Fail Date: 24):   Atorvastatin 40 mg tab last filled on 24 for 90 day supply. Next refill due: 24    Prescribed si tablet/capsule daily    Per Reconcile Dispense History:   ATORVASTATIN CALCIUM  40 MG TABS 2024 90 90 tablet Edwin Bond DO [facility], [facility]   ATORVASTATIN CALCIUM  40 MG TABS 2024 90 90 tablet Edwin Bond,  [facility], [facility]   ATORVASTATIN CALCIUM  40 MG TABS 2023 90 90 tablet  [facility], [facility]   ATORVASTATIN CALCIUM  40 MG TABS 2023 90 90 tablet  [facility], [facility]   ATORVASTATIN CALCIUM  40 MG TABS 2023 90 90 tablet Edwin Bond,  [facility], [facility]   ATORVASTATIN CALCIUM  40 MG TABS 2023 90 90 tablet Edwin Bond,  [facility]   See 24 refill encounter - pt called and asking for Rxs to go to [facility]. Only 2 Rxs had gone through, orders were re-pended and signed, however per Rx on current med list destination was \"NO WHERE\"; 24 Rx was also destination \"NO WHERE\" - text in notes to pharmacy possibly too long?    Per [facility]: tried to call pharmacy to see if they have

## 2024-11-11 DIAGNOSIS — K50.90 CROHN'S DISEASE WITHOUT COMPLICATION, UNSPECIFIED GASTROINTESTINAL TRACT LOCATION (HCC): ICD-10-CM

## 2024-11-11 DIAGNOSIS — I25.119 CORONARY ARTERY DISEASE INVOLVING NATIVE CORONARY ARTERY OF NATIVE HEART WITH ANGINA PECTORIS (HCC): ICD-10-CM

## 2024-11-11 DIAGNOSIS — I21.4 NSTEMI (NON-ST ELEVATED MYOCARDIAL INFARCTION) (HCC): ICD-10-CM

## 2024-11-11 DIAGNOSIS — I71.21 ASCENDING AORTIC ANEURYSM (HCC): ICD-10-CM

## 2024-11-11 DIAGNOSIS — I25.10 CORONARY ARTERY DISEASE INVOLVING NATIVE HEART WITHOUT ANGINA PECTORIS, UNSPECIFIED VESSEL OR LESION TYPE: ICD-10-CM

## 2024-11-11 DIAGNOSIS — I25.5 ISCHEMIC CARDIOMYOPATHY: ICD-10-CM

## 2024-11-11 DIAGNOSIS — E11.65 UNCONTROLLED TYPE 2 DIABETES MELLITUS WITH HYPERGLYCEMIA (HCC): ICD-10-CM

## 2024-11-11 RX ORDER — METOPROLOL SUCCINATE 25 MG/1
12.5 TABLET, EXTENDED RELEASE ORAL DAILY
Qty: 45 TABLET | Refills: 3 | Status: SHIPPED | OUTPATIENT
Start: 2024-11-11

## 2024-11-11 RX ORDER — INSULIN GLARGINE 100 [IU]/ML
INJECTION, SOLUTION SUBCUTANEOUS
Qty: 60 ML | Refills: 3 | Status: SHIPPED | OUTPATIENT
Start: 2024-11-11

## 2024-11-11 RX ORDER — PEN NEEDLE, DIABETIC 32GX 5/32"
1 NEEDLE, DISPOSABLE MISCELLANEOUS DAILY
Qty: 90 EACH | Refills: 0 | Status: SHIPPED | OUTPATIENT
Start: 2024-11-11

## 2024-11-11 RX ORDER — DAPAGLIFLOZIN 10 MG/1
10 TABLET, FILM COATED ORAL EVERY MORNING
Qty: 90 TABLET | Refills: 3 | Status: SHIPPED | OUTPATIENT
Start: 2024-11-11

## 2024-11-11 RX ORDER — MESALAMINE 500 MG/1
1000 CAPSULE, EXTENDED RELEASE ORAL 2 TIMES DAILY
Qty: 360 CAPSULE | Refills: 3 | Status: SHIPPED | OUTPATIENT
Start: 2024-11-11

## 2024-11-11 RX ORDER — GLIPIZIDE 5 MG/1
5 TABLET, FILM COATED, EXTENDED RELEASE ORAL DAILY
Qty: 90 TABLET | Refills: 3 | Status: SHIPPED | OUTPATIENT
Start: 2024-11-11

## 2024-11-11 RX ORDER — FUROSEMIDE 20 MG/1
TABLET ORAL
Qty: 90 TABLET | Refills: 3 | Status: SHIPPED | OUTPATIENT
Start: 2024-11-11

## 2024-11-11 NOTE — TELEPHONE ENCOUNTER
Edwin Bond, , pending refill Rxs for other Rxs that were not sent from 11/4/24 refill encounter (pharmacy had not received refill requests; removing note to pharmacy did resolve issue, atorvastatin was sent and filled 11/8/24.)    Thank you,  Nikole Vogel, PharmD, [facility]  Population Health Pharmacist  [facility] Clinical Pharmacy  Department, toll free: [phone], option 1

## 2024-11-12 NOTE — TELEPHONE ENCOUNTER
Rx signed by provider - thank you!    E-prescribing status confirmed by pharmacy.    For Pharmacy Admin Tracking Only    Program: [employer]  CPA in place:  No  Recommendation Provided To: Provider: 8 via Note to Provider  Intervention Detail: Refill(s) Provided  Intervention Accepted By: Provider: 8  Gap Closed?: Yes   Time Spent (min): 15

## 2024-12-03 ENCOUNTER — OFFICE VISIT (OUTPATIENT)
Dept: CARDIOLOGY CLINIC | Age: 69
End: 2024-12-03

## 2024-12-03 VITALS
HEIGHT: 74 IN | BODY MASS INDEX: 31.06 KG/M2 | HEART RATE: 66 BPM | DIASTOLIC BLOOD PRESSURE: 68 MMHG | WEIGHT: 242 LBS | SYSTOLIC BLOOD PRESSURE: 118 MMHG

## 2024-12-03 DIAGNOSIS — I50.22 CHRONIC SYSTOLIC HEART FAILURE (HCC): ICD-10-CM

## 2024-12-03 DIAGNOSIS — I71.21 ANEURYSM OF ASCENDING AORTA WITHOUT RUPTURE (HCC): ICD-10-CM

## 2024-12-03 DIAGNOSIS — I71.22 ANEURYSM OF AORTIC ARCH WITHOUT RUPTURE (HCC): ICD-10-CM

## 2024-12-03 DIAGNOSIS — I25.119 CORONARY ARTERY DISEASE INVOLVING NATIVE CORONARY ARTERY OF NATIVE HEART WITH ANGINA PECTORIS (HCC): Primary | ICD-10-CM

## 2024-12-03 NOTE — PROGRESS NOTES
[facility]  [place] [address]  [phone]    PrimaryCare Doctor:  Edwin Bond DO  Primary Cardiologist: Dr. Machado    Chief Complaint   Patient presents with    Check-Up     I always have SOB because I smoked for over 50 years.  Stroke behind my left eye in April.         History of Present Illness:  Adán Augustin is a 69 y.o. male with PMH significant for CAD/MI s/p  ascending aortic aneurysm, SHF, crohn's dz, DM, small bowel rsection, pancreatitis, peritonitis, CVA 4/2024     Adm [facility] w Chest pain 12/15-12/16/2022. Associated with SOB, abd pain, nausea and diarrhea.  EKG and Trop neg.  LHC with non obstructive CAD. L CX 70% lesion- started on ranexa.    Patient presents to [facility] cardiology for follow up for CAD.    Hx Chron's - had flare up this morning.     He has chronic SOB that he attributes to his hx of smoking. Smoked for ~ 50 yrs- quit 2019.  Occurs intermittently, gave example of feeling SOB when working in the yard and needs to take breaks. It is not associated with CP, edema, bloating, wt gain.     Denies any weight gain- wt fluctuates Previously 260-270lbs, has been 240s over last 6 mos. Reports it is from his change in diet, trying to eat healthier.    Follows with PCP for DM- says it has been controlled.    He did not get ECHO done as ordered last OV     Review of Systems:   General: Denies fever, chills,  RESP: Denies cough, sputum, dyspnea, wheeze, snoring  CARD: Denies palpitations,  murmur  VASC: Denies claudication, leg cramps, clots  NEURO: Denies numbness, tingling  HEME: Denies abn bruising, bleeding, anemia    /68   Pulse 66   Ht 1.88 m (6' 2.02\")   Wt 109.8 kg (242 lb)   BMI 31.06 kg/m²   Wt Readings from Last 3 Encounters:   12/03/24 109.8 kg (242 lb)   08/15/24 110.9 kg (244 lb 6.4 oz)   05/03/24 117.9 kg (260 lb)       Physical Exam:  GEN: Appears well, no acute distress  SKIN: Pink, warm, dry. Nails

## 2024-12-06 ENCOUNTER — HOSPITAL ENCOUNTER (OUTPATIENT)
Dept: CARDIOLOGY | Age: 69
Discharge: HOME | End: 2024-12-08
Payer: MEDICARE

## 2024-12-06 ENCOUNTER — HOSPITAL ENCOUNTER (OUTPATIENT)
Dept: CT IMAGING | Age: 69
Discharge: HOME | End: 2024-12-06
Payer: MEDICARE

## 2024-12-06 VITALS
HEIGHT: 74 IN | DIASTOLIC BLOOD PRESSURE: 82 MMHG | SYSTOLIC BLOOD PRESSURE: 118 MMHG | WEIGHT: 242 LBS | BODY MASS INDEX: 31.06 KG/M2

## 2024-12-06 DIAGNOSIS — I50.32 CHRONIC DIASTOLIC HEART FAILURE DUE TO VALVULAR DISEASE (HCC): ICD-10-CM

## 2024-12-06 DIAGNOSIS — I50.20 SYSTOLIC HEART FAILURE, UNSPECIFIED HF CHRONICITY (HCC): ICD-10-CM

## 2024-12-06 DIAGNOSIS — I38 CHRONIC DIASTOLIC HEART FAILURE DUE TO VALVULAR DISEASE (HCC): ICD-10-CM

## 2024-12-06 DIAGNOSIS — I71.22 ANEURYSM OF AORTIC ARCH WITHOUT RUPTURE (HCC): ICD-10-CM

## 2024-12-06 LAB
CREAT SERPL-MCNC: 1.1 MG/DL (ref 0.8–1.3)
ECHO AO ASC DIAM: 3.7 CM
ECHO AO ASCENDING AORTA INDEX: 1.57 CM/M2
ECHO AO ROOT DIAM: 3.1 CM
ECHO AO ROOT INDEX: 1.31 CM/M2
ECHO AV AREA PEAK VELOCITY: 2.7 CM2
ECHO AV AREA VTI: 2.5 CM2
ECHO AV AREA/BSA PEAK VELOCITY: 1.1 CM2/M2
ECHO AV AREA/BSA VTI: 1.1 CM2/M2
ECHO AV CUSP MM: 2.1 CM
ECHO AV MEAN GRADIENT: 3 MMHG
ECHO AV MEAN VELOCITY: 0.8 M/S
ECHO AV PEAK GRADIENT: 6 MMHG
ECHO AV PEAK VELOCITY: 1.2 M/S
ECHO AV VELOCITY RATIO: 0.83
ECHO AV VTI: 25.1 CM
ECHO BSA: 2.39 M2
ECHO LA AREA 2C: 22 CM2
ECHO LA AREA 4C: 13.8 CM2
ECHO LA MAJOR AXIS: 4.7 CM
ECHO LA MINOR AXIS: 5.7 CM
ECHO LA VOL BP: 53 ML (ref 18–58)
ECHO LA VOL MOD A2C: 71 ML (ref 18–58)
ECHO LA VOL MOD A4C: 34 ML (ref 18–58)
ECHO LA VOL/BSA BIPLANE: 22 ML/M2 (ref 16–34)
ECHO LA VOLUME INDEX MOD A2C: 30 ML/M2 (ref 16–34)
ECHO LA VOLUME INDEX MOD A4C: 14 ML/M2 (ref 16–34)
ECHO LV E' LATERAL VELOCITY: 10.2 CM/S
ECHO LV E' SEPTAL VELOCITY: 5.22 CM/S
ECHO LV EDV A2C: 125 ML
ECHO LV EDV A4C: 106 ML
ECHO LV EDV INDEX A4C: 45 ML/M2
ECHO LV EDV NDEX A2C: 53 ML/M2
ECHO LV EF PHYSICIAN: 55 %
ECHO LV EJECTION FRACTION A2C: 56 %
ECHO LV EJECTION FRACTION A4C: 50 %
ECHO LV EJECTION FRACTION BIPLANE: 54 % (ref 55–100)
ECHO LV ESV A2C: 55 ML
ECHO LV ESV A4C: 53 ML
ECHO LV ESV INDEX A2C: 23 ML/M2
ECHO LV ESV INDEX A4C: 22 ML/M2
ECHO LV FRACTIONAL SHORTENING: 29 % (ref 28–44)
ECHO LV GLOBAL LONGITUDINAL STRAIN (GLS): -17.3 %
ECHO LV INTERNAL DIMENSION DIASTOLE INDEX: 2.08 CM/M2
ECHO LV INTERNAL DIMENSION DIASTOLIC: 4.9 CM (ref 4.2–5.9)
ECHO LV INTERNAL DIMENSION SYSTOLIC INDEX: 1.48 CM/M2
ECHO LV INTERNAL DIMENSION SYSTOLIC: 3.5 CM
ECHO LV IVSD: 1 CM (ref 0.6–1)
ECHO LV MASS 2D: 176 G (ref 88–224)
ECHO LV MASS INDEX 2D: 74.6 G/M2 (ref 49–115)
ECHO LV POSTERIOR WALL DIASTOLIC: 1 CM (ref 0.6–1)
ECHO LV RELATIVE WALL THICKNESS RATIO: 0.41
ECHO LVOT AREA: 3.1 CM2
ECHO LVOT AV VTI INDEX: 0.8
ECHO LVOT DIAM: 2 CM
ECHO LVOT MEAN GRADIENT: 2 MMHG
ECHO LVOT PEAK GRADIENT: 4 MMHG
ECHO LVOT PEAK VELOCITY: 1 M/S
ECHO LVOT STROKE VOLUME INDEX: 26.7 ML/M2
ECHO LVOT SV: 63.1 ML
ECHO LVOT VTI: 20.1 CM
ECHO MV A VELOCITY: 0.79 M/S
ECHO MV AREA VTI: 2.3 CM2
ECHO MV E DECELERATION TIME (DT): 248 MS
ECHO MV E VELOCITY: 0.53 M/S
ECHO MV E/A RATIO: 0.67
ECHO MV E/E' LATERAL: 5.2
ECHO MV E/E' RATIO (AVERAGED): 7.67
ECHO MV E/E' SEPTAL: 10.15
ECHO MV LVOT VTI INDEX: 1.35
ECHO MV MAX VELOCITY: 1 M/S
ECHO MV MEAN GRADIENT: 2 MMHG
ECHO MV MEAN VELOCITY: 0.6 M/S
ECHO MV PEAK GRADIENT: 4 MMHG
ECHO MV VTI: 27.2 CM
ECHO PV MAX VELOCITY: 1.1 M/S
ECHO PV MEAN GRADIENT: 2 MMHG
ECHO PV MEAN VELOCITY: 0.7 M/S
ECHO PV PEAK GRADIENT: 4 MMHG
ECHO PV VTI: 20.3 CM
ECHO RA AREA 4C: 15.3 CM2
ECHO RA END SYSTOLIC VOLUME APICAL 4 CHAMBER INDEX BSA: 15 ML/M2
ECHO RA VOLUME: 36 ML
ECHO RV BASAL DIMENSION: 3.4 CM
ECHO RV INTERNAL DIMENSION: 3.4 CM
ECHO RV LONGITUDINAL DIMENSION: 7.2 CM
ECHO RV MID DIMENSION: 3.2 CM
ECHO RV TAPSE: 1.9 CM (ref 1.7–?)
GFR SERPLBLD CREATININE-BSD FMLA CKD-EPI: 73 ML/MIN/{1.73_M2}

## 2024-12-06 PROCEDURE — 93356 MYOCRD STRAIN IMG SPCKL TRCK: CPT | Performed by: INTERNAL MEDICINE

## 2024-12-06 PROCEDURE — 93306 TTE W/DOPPLER COMPLETE: CPT

## 2024-12-06 PROCEDURE — 36415 COLL VENOUS BLD VENIPUNCTURE: CPT

## 2024-12-06 PROCEDURE — 82565 ASSAY OF CREATININE: CPT

## 2024-12-06 PROCEDURE — 93306 TTE W/DOPPLER COMPLETE: CPT | Performed by: INTERNAL MEDICINE

## 2024-12-06 PROCEDURE — 6360000004 HC RX CONTRAST MEDICATION: Performed by: NURSE PRACTITIONER

## 2024-12-06 PROCEDURE — 71260 CT THORAX DX C+: CPT

## 2024-12-06 RX ADMIN — IOMEPROL INJECTION 100 ML: 714 INJECTION, SOLUTION INTRAVASCULAR at 10:59

## 2024-12-27 ENCOUNTER — TELEPHONE (OUTPATIENT)
Dept: ADMINISTRATIVE | Age: 69
End: 2024-12-27

## 2024-12-27 RX ORDER — DAPAGLIFLOZIN 10 MG/1
10 TABLET, FILM COATED ORAL EVERY MORNING
Qty: 90 TABLET | Refills: 1 | Status: SHIPPED | OUTPATIENT
Start: 2024-12-27

## 2024-12-27 NOTE — TELEPHONE ENCOUNTER
Submitted PA for Dapagliflozin Propanediol 10MG tablets   [facility] Key: [id number] STATUS: PENDING.    Follow up done daily; if no decision with in three days we will refax.  If another three days goes by with no decision will call the insurance for status.

## 2024-12-27 NOTE — TELEPHONE ENCOUNTER
Per fax from insurance to have pharmacy re-process under brand name farxiga which is covered under the plan.  Letter scanned into the chart

## 2025-02-03 ENCOUNTER — OFFICE VISIT (OUTPATIENT)
Dept: PRIMARY CARE CLINIC | Age: 70
End: 2025-02-03
Payer: MEDICARE

## 2025-02-03 VITALS
HEART RATE: 70 BPM | OXYGEN SATURATION: 95 % | WEIGHT: 250 LBS | HEIGHT: 74 IN | BODY MASS INDEX: 32.08 KG/M2 | RESPIRATION RATE: 18 BRPM | SYSTOLIC BLOOD PRESSURE: 116 MMHG | TEMPERATURE: 96.8 F | DIASTOLIC BLOOD PRESSURE: 72 MMHG

## 2025-02-03 DIAGNOSIS — K50.90 CROHN'S DISEASE WITHOUT COMPLICATION, UNSPECIFIED GASTROINTESTINAL TRACT LOCATION (HCC): ICD-10-CM

## 2025-02-03 DIAGNOSIS — E78.2 MIXED HYPERLIPIDEMIA: ICD-10-CM

## 2025-02-03 DIAGNOSIS — E11.65 UNCONTROLLED TYPE 2 DIABETES MELLITUS WITH HYPERGLYCEMIA (HCC): Primary | ICD-10-CM

## 2025-02-03 DIAGNOSIS — I21.4 NSTEMI (NON-ST ELEVATED MYOCARDIAL INFARCTION) (HCC): ICD-10-CM

## 2025-02-03 DIAGNOSIS — I50.22 CHRONIC SYSTOLIC HEART FAILURE (HCC): ICD-10-CM

## 2025-02-03 LAB — HBA1C MFR BLD: 8.7 %

## 2025-02-03 PROCEDURE — 3017F COLORECTAL CA SCREEN DOC REV: CPT | Performed by: FAMILY MEDICINE

## 2025-02-03 PROCEDURE — G8417 CALC BMI ABV UP PARAM F/U: HCPCS | Performed by: FAMILY MEDICINE

## 2025-02-03 PROCEDURE — 83036 HEMOGLOBIN GLYCOSYLATED A1C: CPT | Performed by: FAMILY MEDICINE

## 2025-02-03 PROCEDURE — 1036F TOBACCO NON-USER: CPT | Performed by: FAMILY MEDICINE

## 2025-02-03 PROCEDURE — 2022F DILAT RTA XM EVC RTNOPTHY: CPT | Performed by: FAMILY MEDICINE

## 2025-02-03 PROCEDURE — 1160F RVW MEDS BY RX/DR IN RCRD: CPT | Performed by: FAMILY MEDICINE

## 2025-02-03 PROCEDURE — 1123F ACP DISCUSS/DSCN MKR DOCD: CPT | Performed by: FAMILY MEDICINE

## 2025-02-03 PROCEDURE — 1159F MED LIST DOCD IN RCRD: CPT | Performed by: FAMILY MEDICINE

## 2025-02-03 PROCEDURE — 3052F HG A1C>EQUAL 8.0%<EQUAL 9.0%: CPT | Performed by: FAMILY MEDICINE

## 2025-02-03 PROCEDURE — G2211 COMPLEX E/M VISIT ADD ON: HCPCS | Performed by: FAMILY MEDICINE

## 2025-02-03 PROCEDURE — G8427 DOCREV CUR MEDS BY ELIG CLIN: HCPCS | Performed by: FAMILY MEDICINE

## 2025-02-03 PROCEDURE — 99214 OFFICE O/P EST MOD 30 MIN: CPT | Performed by: FAMILY MEDICINE

## 2025-02-03 SDOH — ECONOMIC STABILITY: FOOD INSECURITY: WITHIN THE PAST 12 MONTHS, THE FOOD YOU BOUGHT JUST DIDN'T LAST AND YOU DIDN'T HAVE MONEY TO GET MORE.: NEVER TRUE

## 2025-02-03 SDOH — ECONOMIC STABILITY: FOOD INSECURITY: WITHIN THE PAST 12 MONTHS, YOU WORRIED THAT YOUR FOOD WOULD RUN OUT BEFORE YOU GOT MONEY TO BUY MORE.: NEVER TRUE

## 2025-02-03 ASSESSMENT — PATIENT HEALTH QUESTIONNAIRE - PHQ9
SUM OF ALL RESPONSES TO PHQ QUESTIONS 1-9: 0
SUM OF ALL RESPONSES TO PHQ QUESTIONS 1-9: 0
2. FEELING DOWN, DEPRESSED OR HOPELESS: NOT AT ALL
SUM OF ALL RESPONSES TO PHQ9 QUESTIONS 1 & 2: 0
1. LITTLE INTEREST OR PLEASURE IN DOING THINGS: NOT AT ALL
SUM OF ALL RESPONSES TO PHQ QUESTIONS 1-9: 0
SUM OF ALL RESPONSES TO PHQ QUESTIONS 1-9: 0

## 2025-02-03 NOTE — PROGRESS NOTES
2/3/2025     Adán Augustin (:  1955) is a 69 y.o. male, here for evaluation of the following medical concerns:    HPI  Patient presented today to follow-up on several chronic medical issues.  Overall he said he is feeling well today but has multiple chronic conditions he wants to discuss.The patient's wife comes with him to help with his history but she is not here today.  Patient has multiple medical issues particularly with Crohn's, heart failure, and diabetes.  Becomes frustrated at times because he believes he tries very hard to be compliant and monitor his diet that yet he still feels like he is not controlled with any of the above.     DM II-  A1C is  poorly controlled today Takes lantus 66  at night will increase this to 65, patient has A1C that was .8.7.   He he has been using the Dexcom to check his sugars and stated that that has helped him, not sure what else he can do? Seems frustrated, also taking glipizide 5 mg daily,  Metformin 500 BID, unable to tolerate higher dose due to Crohns,  needs eye exam and foot exam today.  Also using Farxiga 10 mg a day, needs his microalbumin as well as his A1c.     CAD-  NSTEMI- 2017/Ascending aortic aneurysm, follows with Dr. Wick, last visit was in , Hospitalized from 2017-8/15/2017 with NSTEMI.   He underwent cardiac cath which resulted in SHAN x2 to RCA. LVEF 35% initially and follow up echo showing LVEF 55-60% (done after PCI).  aneurysm- 4 cm according to note from cardiology has been stable since .  Recently saw his cardiologist in 2024,     Crohn's- still following with GI, needs colonoscopy and will defer to his GI doctor.  Stated they have spoken concerning this and have a plan to revisit the test this year.  Will follow up with GI for colonoscopy in the not-too-distant future, patient has a difficult time managing his Crohn's and unfortunately has a direct effect on his other chronic medical conditions.  We

## 2025-02-05 ASSESSMENT — ENCOUNTER SYMPTOMS
SHORTNESS OF BREATH: 0
VOMITING: 0
FACIAL SWELLING: 0
TROUBLE SWALLOWING: 0
ABDOMINAL PAIN: 1
SINUS PRESSURE: 0
DIARRHEA: 0
COUGH: 0
SORE THROAT: 0
WHEEZING: 0
NAUSEA: 0
CHEST TIGHTNESS: 0
RHINORRHEA: 0
EYE DISCHARGE: 0
BLOOD IN STOOL: 0
ANAL BLEEDING: 0

## 2025-03-26 ENCOUNTER — TELEPHONE (OUTPATIENT)
Dept: PRIMARY CARE CLINIC | Age: 70
End: 2025-03-26

## 2025-03-26 NOTE — TELEPHONE ENCOUNTER
Patient requesting a medication refill. Pt asking for a 3 mos supply with 3 refills.  Medication  Dapagliflozin(Farxiga)  Dosage  10 mg  Frequency Take 1 tablet by mouth every morning  Last filled on  12/27/24  [facility] Ph [phone]. Fax [phone]  Next office visit 8/5/25

## 2025-03-27 NOTE — TELEPHONE ENCOUNTER
Unable to reorder Farxiga from medication list -     Why Am I Seeing These Alternatives?   dapagliflozin (FARXIGA) 10 MG tablet is not on the preferred formulary for the patient's insurance plan. Below are alternatives which are likely to be more affordable. Do not assume that every medication presented is a clinically appropriate alternative.  These alternatives were suggested by the patient's pharmacy benefit manager.        Medication:   Requested Prescriptions      No prescriptions requested or ordered in this encounter       Last Filled:      Patient Phone Number: [phone] (home)     Last appt: 2/3/2025   Next appt: 8/5/2025    Last Labs DM:   Lab Results   Component Value Date/Time    LABA1C 8.7 02/03/2025 01:36 PM

## 2025-03-28 NOTE — TELEPHONE ENCOUNTER
In his usual no medications listed as a preferred prescription.  I have called in [employer] and we will see if this is covered.

## 2025-06-16 ENCOUNTER — OFFICE VISIT (OUTPATIENT)
Dept: CARDIOLOGY CLINIC | Age: 70
End: 2025-06-16
Payer: MEDICARE

## 2025-06-16 VITALS
SYSTOLIC BLOOD PRESSURE: 136 MMHG | OXYGEN SATURATION: 96 % | HEART RATE: 84 BPM | DIASTOLIC BLOOD PRESSURE: 84 MMHG | HEIGHT: 74 IN | BODY MASS INDEX: 31.57 KG/M2 | RESPIRATION RATE: 18 BRPM | WEIGHT: 246 LBS

## 2025-06-16 DIAGNOSIS — I71.22 ANEURYSM OF AORTIC ARCH WITHOUT RUPTURE: ICD-10-CM

## 2025-06-16 DIAGNOSIS — E78.2 MIXED HYPERLIPIDEMIA: ICD-10-CM

## 2025-06-16 DIAGNOSIS — I25.119 CORONARY ARTERY DISEASE INVOLVING NATIVE CORONARY ARTERY OF NATIVE HEART WITH ANGINA PECTORIS: ICD-10-CM

## 2025-06-16 DIAGNOSIS — I71.21 ANEURYSM OF ASCENDING AORTA WITHOUT RUPTURE: ICD-10-CM

## 2025-06-16 DIAGNOSIS — I50.22 CHRONIC SYSTOLIC HEART FAILURE (HCC): Primary | ICD-10-CM

## 2025-06-16 PROCEDURE — 1160F RVW MEDS BY RX/DR IN RCRD: CPT | Performed by: NURSE PRACTITIONER

## 2025-06-16 PROCEDURE — 3017F COLORECTAL CA SCREEN DOC REV: CPT | Performed by: NURSE PRACTITIONER

## 2025-06-16 PROCEDURE — 1123F ACP DISCUSS/DSCN MKR DOCD: CPT | Performed by: NURSE PRACTITIONER

## 2025-06-16 PROCEDURE — G8427 DOCREV CUR MEDS BY ELIG CLIN: HCPCS | Performed by: NURSE PRACTITIONER

## 2025-06-16 PROCEDURE — 1159F MED LIST DOCD IN RCRD: CPT | Performed by: NURSE PRACTITIONER

## 2025-06-16 PROCEDURE — 99214 OFFICE O/P EST MOD 30 MIN: CPT | Performed by: NURSE PRACTITIONER

## 2025-06-16 PROCEDURE — 1036F TOBACCO NON-USER: CPT | Performed by: NURSE PRACTITIONER

## 2025-06-16 PROCEDURE — G8417 CALC BMI ABV UP PARAM F/U: HCPCS | Performed by: NURSE PRACTITIONER

## 2025-06-16 PROCEDURE — 93000 ELECTROCARDIOGRAM COMPLETE: CPT | Performed by: NURSE PRACTITIONER

## 2025-06-16 PROCEDURE — G2211 COMPLEX E/M VISIT ADD ON: HCPCS | Performed by: NURSE PRACTITIONER

## 2025-06-16 RX ORDER — OMEPRAZOLE 20 MG/1
20 CAPSULE, DELAYED RELEASE ORAL DAILY
Qty: 90 CAPSULE | Refills: 3 | Status: SHIPPED | OUTPATIENT
Start: 2025-06-16

## 2025-06-16 NOTE — PROGRESS NOTES
Reported on 6/16/2025 11/11/24   Edwin Bond DO        Allergies:  Codeine, Acetaminophen, Oxycodone hcl, Oxycodone-acetaminophen, Percocet [oxycodone-acetaminophen], and Oxycodone       LABS: Results reviewed with patient today.    CBC:   Lab Results   Component Value Date/Time    WBC 5.3 04/14/2025 12:34 PM    WBC 6.2 12/18/2024 11:54 AM    WBC 5.6 08/28/2024 11:49 AM    RBC 4.97 04/14/2025 12:34 PM    RBC 5.21 12/18/2024 11:54 AM    RBC 5.14 08/28/2024 11:49 AM    HGB 14.3 04/14/2025 12:34 PM    HGB 15.1 12/18/2024 11:54 AM    HGB 15.0 08/28/2024 11:49 AM    HCT 42.9 04/14/2025 12:34 PM    HCT 44.4 12/18/2024 11:54 AM    HCT 44.7 08/28/2024 11:49 AM    MCV 86.2 04/14/2025 12:34 PM    MCV 85.3 12/18/2024 11:54 AM    MCV 87.0 08/28/2024 11:49 AM    RDW 14.2 04/14/2025 12:34 PM    RDW 13.9 12/18/2024 11:54 AM    RDW 14.3 08/28/2024 11:49 AM     04/14/2025 12:34 PM     12/18/2024 11:54 AM     08/28/2024 11:49 AM     BMP:  Lab Results   Component Value Date/Time     04/14/2025 12:34 PM     12/18/2024 11:54 AM     08/28/2024 11:49 AM    K 4.1 04/14/2025 12:34 PM    K 4.5 12/18/2024 11:54 AM    K 4.7 08/28/2024 11:49 AM    K 4.6 04/28/2024 01:18 PM    K 3.9 12/15/2022 03:11 AM    K 3.5 10/09/2020 05:22 AM     04/14/2025 12:34 PM     12/18/2024 11:54 AM     08/28/2024 11:49 AM    CO2 24 04/14/2025 12:34 PM    CO2 28 12/18/2024 11:54 AM    CO2 24 08/28/2024 11:49 AM    PHOS 3.7 03/11/2020 02:46 PM    PHOS 3.2 02/05/2020 02:16 PM    PHOS 3.5 12/14/2018 02:47 PM    BUN 22 04/14/2025 12:34 PM    BUN 20 12/18/2024 11:54 AM    BUN 27 08/28/2024 11:49 AM    CREATININE 1.22 04/14/2025 12:34 PM    CREATININE 1.28 12/18/2024 11:54 AM    CREATININE 1.1 12/06/2024 10:46 AM     BNP:   Lab Results   Component Value Date/Time    PROBNP 31 12/15/2022 03:11 AM    PROBNP 59 01/18/2018 02:54 PM    PROBNP 19 11/30/2017 04:03 PM       Parameters:   > 450 pg/mL under age 50  > 900

## 2025-08-13 ENCOUNTER — OFFICE VISIT (OUTPATIENT)
Dept: PRIMARY CARE CLINIC | Age: 70
End: 2025-08-13
Payer: MEDICARE

## 2025-08-13 VITALS
BODY MASS INDEX: 31.78 KG/M2 | TEMPERATURE: 97.2 F | SYSTOLIC BLOOD PRESSURE: 130 MMHG | HEART RATE: 72 BPM | DIASTOLIC BLOOD PRESSURE: 82 MMHG | WEIGHT: 247.6 LBS | RESPIRATION RATE: 16 BRPM | HEIGHT: 74 IN | OXYGEN SATURATION: 95 %

## 2025-08-13 DIAGNOSIS — I25.119 CORONARY ARTERY DISEASE INVOLVING NATIVE CORONARY ARTERY OF NATIVE HEART WITH ANGINA PECTORIS: Primary | ICD-10-CM

## 2025-08-13 DIAGNOSIS — E11.65 UNCONTROLLED TYPE 2 DIABETES MELLITUS WITH HYPERGLYCEMIA (HCC): ICD-10-CM

## 2025-08-13 DIAGNOSIS — K50.90 CROHN'S DISEASE WITHOUT COMPLICATION, UNSPECIFIED GASTROINTESTINAL TRACT LOCATION (HCC): ICD-10-CM

## 2025-08-13 LAB — HBA1C MFR BLD: 10.2 %

## 2025-08-13 PROCEDURE — G8427 DOCREV CUR MEDS BY ELIG CLIN: HCPCS | Performed by: FAMILY MEDICINE

## 2025-08-13 PROCEDURE — 1123F ACP DISCUSS/DSCN MKR DOCD: CPT | Performed by: FAMILY MEDICINE

## 2025-08-13 PROCEDURE — 3046F HEMOGLOBIN A1C LEVEL >9.0%: CPT | Performed by: FAMILY MEDICINE

## 2025-08-13 PROCEDURE — G2211 COMPLEX E/M VISIT ADD ON: HCPCS | Performed by: FAMILY MEDICINE

## 2025-08-13 PROCEDURE — 2022F DILAT RTA XM EVC RTNOPTHY: CPT | Performed by: FAMILY MEDICINE

## 2025-08-13 PROCEDURE — 99214 OFFICE O/P EST MOD 30 MIN: CPT | Performed by: FAMILY MEDICINE

## 2025-08-13 PROCEDURE — 1036F TOBACCO NON-USER: CPT | Performed by: FAMILY MEDICINE

## 2025-08-13 PROCEDURE — G8417 CALC BMI ABV UP PARAM F/U: HCPCS | Performed by: FAMILY MEDICINE

## 2025-08-13 PROCEDURE — 3017F COLORECTAL CA SCREEN DOC REV: CPT | Performed by: FAMILY MEDICINE

## 2025-08-13 PROCEDURE — 83036 HEMOGLOBIN GLYCOSYLATED A1C: CPT | Performed by: FAMILY MEDICINE

## 2025-08-13 PROCEDURE — 1160F RVW MEDS BY RX/DR IN RCRD: CPT | Performed by: FAMILY MEDICINE

## 2025-08-13 PROCEDURE — 1159F MED LIST DOCD IN RCRD: CPT | Performed by: FAMILY MEDICINE

## 2025-08-13 ASSESSMENT — PATIENT HEALTH QUESTIONNAIRE - PHQ9
2. FEELING DOWN, DEPRESSED OR HOPELESS: NOT AT ALL
SUM OF ALL RESPONSES TO PHQ QUESTIONS 1-9: 0
1. LITTLE INTEREST OR PLEASURE IN DOING THINGS: NOT AT ALL
SUM OF ALL RESPONSES TO PHQ QUESTIONS 1-9: 0

## 2025-08-13 ASSESSMENT — LIFESTYLE VARIABLES
HOW OFTEN DO YOU HAVE A DRINK CONTAINING ALCOHOL: MONTHLY OR LESS
HOW MANY STANDARD DRINKS CONTAINING ALCOHOL DO YOU HAVE ON A TYPICAL DAY: 3 OR 4

## 2025-08-16 ASSESSMENT — ENCOUNTER SYMPTOMS
COUGH: 0
CHEST TIGHTNESS: 0
FACIAL SWELLING: 0
ANAL BLEEDING: 0
SORE THROAT: 0
ABDOMINAL PAIN: 0
WHEEZING: 0
BLOOD IN STOOL: 0
VOMITING: 0
SINUS PRESSURE: 0
DIARRHEA: 0
SHORTNESS OF BREATH: 0
RHINORRHEA: 0
TROUBLE SWALLOWING: 0
NAUSEA: 0
EYE DISCHARGE: 0

## 2025-08-20 RX ORDER — PEN NEEDLE, DIABETIC 32GX 5/32"
1 NEEDLE, DISPOSABLE MISCELLANEOUS DAILY
Qty: 90 EACH | Refills: 0 | Status: SHIPPED | OUTPATIENT
Start: 2025-08-20 | End: 2025-08-21 | Stop reason: SDUPTHER

## 2025-08-21 RX ORDER — PEN NEEDLE, DIABETIC 32GX 5/32"
1 NEEDLE, DISPOSABLE MISCELLANEOUS DAILY
Qty: 90 EACH | Refills: 0
Start: 2025-08-21

## 2025-09-03 DIAGNOSIS — I71.21 ASCENDING AORTIC ANEURYSM: ICD-10-CM

## 2025-09-03 DIAGNOSIS — I21.4 NSTEMI (NON-ST ELEVATED MYOCARDIAL INFARCTION) (HCC): ICD-10-CM

## 2025-09-03 DIAGNOSIS — E11.65 UNCONTROLLED TYPE 2 DIABETES MELLITUS WITH HYPERGLYCEMIA (HCC): ICD-10-CM

## 2025-09-03 DIAGNOSIS — K50.90 CROHN'S DISEASE WITHOUT COMPLICATION, UNSPECIFIED GASTROINTESTINAL TRACT LOCATION (HCC): ICD-10-CM

## 2025-09-03 DIAGNOSIS — I25.119 CORONARY ARTERY DISEASE INVOLVING NATIVE CORONARY ARTERY OF NATIVE HEART WITH ANGINA PECTORIS: ICD-10-CM

## 2025-09-03 RX ORDER — INSULIN GLARGINE 100 [IU]/ML
INJECTION, SOLUTION SUBCUTANEOUS
Qty: 60 ML | Refills: 3 | Status: SHIPPED | OUTPATIENT
Start: 2025-09-03

## 2025-09-03 RX ORDER — PEN NEEDLE, DIABETIC 32GX 5/32"
1 NEEDLE, DISPOSABLE MISCELLANEOUS DAILY
Qty: 90 EACH | Refills: 0 | Status: SHIPPED | OUTPATIENT
Start: 2025-09-03